# Patient Record
Sex: FEMALE | Race: WHITE | NOT HISPANIC OR LATINO | Employment: UNEMPLOYED | URBAN - METROPOLITAN AREA
[De-identification: names, ages, dates, MRNs, and addresses within clinical notes are randomized per-mention and may not be internally consistent; named-entity substitution may affect disease eponyms.]

---

## 2017-04-19 ENCOUNTER — OFF PREMISE (OUTPATIENT)
Dept: OTHER | Age: 62
End: 2017-04-19

## 2017-04-25 ENCOUNTER — OFF PREMISE (OUTPATIENT)
Dept: OTHER | Age: 62
End: 2017-04-25

## 2017-05-08 ENCOUNTER — OFF PREMISE (OUTPATIENT)
Dept: OTHER | Age: 62
End: 2017-05-08

## 2017-08-07 ENCOUNTER — OFFICE VISIT (OUTPATIENT)
Dept: FAMILY MEDICINE | Age: 62
End: 2017-08-07

## 2017-08-07 VITALS
OXYGEN SATURATION: 95 % | BODY MASS INDEX: 21.34 KG/M2 | WEIGHT: 128.09 LBS | SYSTOLIC BLOOD PRESSURE: 106 MMHG | DIASTOLIC BLOOD PRESSURE: 64 MMHG | HEIGHT: 65 IN | HEART RATE: 86 BPM

## 2017-08-07 DIAGNOSIS — E03.9 ACQUIRED HYPOTHYROIDISM: ICD-10-CM

## 2017-08-07 DIAGNOSIS — R31.9 HEMATURIA, UNSPECIFIED: ICD-10-CM

## 2017-08-07 DIAGNOSIS — E78.2 MIXED HYPERLIPIDEMIA: ICD-10-CM

## 2017-08-07 DIAGNOSIS — D64.9 ANEMIA, UNSPECIFIED: ICD-10-CM

## 2017-08-07 DIAGNOSIS — N39.0 URINARY TRACT INFECTION, SITE NOT SPECIFIED: ICD-10-CM

## 2017-08-07 PROCEDURE — G8732 NO DOC OF PAIN: HCPCS | Performed by: FAMILY MEDICINE

## 2017-08-07 PROCEDURE — 99214 OFFICE O/P EST MOD 30 MIN: CPT | Performed by: FAMILY MEDICINE

## 2017-08-07 PROCEDURE — 3046F HEMOGLOBIN A1C LEVEL >9.0%: CPT | Performed by: FAMILY MEDICINE

## 2017-08-07 PROCEDURE — G8420 CALC BMI NORM PARAMETERS: HCPCS | Performed by: FAMILY MEDICINE

## 2017-08-07 PROCEDURE — 3017F COLORECTAL CA SCREEN DOC REV: CPT | Performed by: FAMILY MEDICINE

## 2017-08-07 PROCEDURE — G8427 DOCREV CUR MEDS BY ELIG CLIN: HCPCS | Performed by: FAMILY MEDICINE

## 2017-08-07 PROCEDURE — 3014F SCREEN MAMMO DOC REV: CPT | Performed by: FAMILY MEDICINE

## 2017-08-07 PROCEDURE — 4004F PT TOBACCO SCREEN RCVD TLK: CPT | Performed by: FAMILY MEDICINE

## 2017-08-07 RX ORDER — LISINOPRIL 2.5 MG/1
2.5 TABLET ORAL DAILY
COMMUNITY
End: 2017-09-18 | Stop reason: SDUPTHER

## 2017-08-07 ASSESSMENT — ENCOUNTER SYMPTOMS
WHEEZING: 0
VOMITING: 0
SHORTNESS OF BREATH: 0
CONSTITUTIONAL NEGATIVE: 1
RESPIRATORY NEGATIVE: 1
UNEXPECTED WEIGHT CHANGE: 0
HEADACHES: 0
NEUROLOGICAL NEGATIVE: 1
NAUSEA: 0
DIZZINESS: 0
CHEST TIGHTNESS: 0
DIARRHEA: 0
NERVOUS/ANXIOUS: 1
AGITATION: 1
HALLUCINATIONS: 0
GASTROINTESTINAL NEGATIVE: 1
FATIGUE: 0

## 2017-09-01 ENCOUNTER — LAB SERVICES (OUTPATIENT)
Dept: LAB | Age: 62
End: 2017-09-01

## 2017-09-01 DIAGNOSIS — D64.9 ANEMIA, UNSPECIFIED: ICD-10-CM

## 2017-09-01 DIAGNOSIS — E78.2 MIXED HYPERLIPIDEMIA: ICD-10-CM

## 2017-09-01 DIAGNOSIS — R31.9 HEMATURIA, UNSPECIFIED: ICD-10-CM

## 2017-09-01 DIAGNOSIS — E03.9 ACQUIRED HYPOTHYROIDISM: ICD-10-CM

## 2017-09-01 LAB
ALBUMIN SERPL-MCNC: 3.9 G/DL (ref 3.6–5.1)
ALBUMIN/GLOB SERPL: 1.3 {RATIO} (ref 1–2.4)
ALP SERPL-CCNC: 90 UNITS/L (ref 45–117)
ALT SERPL-CCNC: 16 UNITS/L
ANION GAP SERPL CALC-SCNC: 11 MMOL/L (ref 10–20)
APPEARANCE UR: CLEAR
AST SERPL-CCNC: 12 UNITS/L
BACTERIA #/AREA URNS HPF: ABNORMAL /HPF
BASOPHILS # BLD AUTO: 0 K/MCL (ref 0–0.3)
BASOPHILS NFR BLD AUTO: 0 %
BILIRUB SERPL-MCNC: 0.4 MG/DL (ref 0.2–1)
BILIRUB UR QL STRIP: NEGATIVE
BUN SERPL-MCNC: 8 MG/DL (ref 6–20)
BUN/CREAT SERPL: 10 (ref 7–25)
CALCIUM SERPL-MCNC: 9.4 MG/DL (ref 8.4–10.2)
CHLORIDE SERPL-SCNC: 102 MMOL/L (ref 98–107)
CHOLEST SERPL-MCNC: 251 MG/DL
CHOLEST/HDLC SERPL: 5.3 {RATIO}
CO2 SERPL-SCNC: 30 MMOL/L (ref 21–32)
COLOR UR: YELLOW
CREAT SERPL-MCNC: 0.84 MG/DL (ref 0.51–0.95)
CREAT UR-MCNC: 18.4 MG/DL
DIFFERENTIAL METHOD BLD: ABNORMAL
EOSINOPHIL # BLD AUTO: 0.1 K/MCL (ref 0.1–0.5)
EOSINOPHIL NFR SPEC: 2 %
ERYTHROCYTE [DISTWIDTH] IN BLOOD: 13.7 % (ref 11–15)
FASTING STATUS PATIENT QL REPORTED: 12 HRS
GLOBULIN SER-MCNC: 3 G/DL (ref 2–4)
GLUCOSE SERPL-MCNC: 97 MG/DL (ref 65–99)
GLUCOSE UR STRIP-MCNC: NEGATIVE MG/DL
HBA1C MFR BLD: 5.6 % (ref 4.5–5.6)
HCT VFR BLD CALC: 41.3 % (ref 36–46.5)
HDLC SERPL-MCNC: 47 MG/DL
HGB BLD-MCNC: 13.1 G/DL (ref 12–15.5)
HGB UR QL STRIP: NEGATIVE
HYALINE CASTS #/AREA URNS LPF: ABNORMAL /LPF (ref 0–5)
KETONES UR STRIP-MCNC: NEGATIVE MG/DL
LDLC SERPL-MCNC: 176 MG/DL
LENGTH OF FAST TIME PATIENT: 10 HRS
LEUKOCYTE ESTERASE UR QL STRIP: ABNORMAL
LYMPHOCYTES # BLD MANUAL: 2.3 K/MCL (ref 1–4)
LYMPHOCYTES NFR BLD MANUAL: 33 %
MCH RBC QN AUTO: 29.2 PG (ref 26–34)
MCHC RBC AUTO-ENTMCNC: 31.7 G/DL (ref 32–36.5)
MCV RBC AUTO: 92.2 FL (ref 78–100)
MICROALBUMIN UR-MCNC: <0.5 MG/DL
MICROALBUMIN/CREAT UR: NORMAL MCG/MG
MONOCYTES # BLD MANUAL: 0.5 K/MCL (ref 0.3–0.9)
MONOCYTES NFR BLD MANUAL: 8 %
NEUTROPHILS # BLD: 4.1 K/MCL (ref 1.8–7.7)
NEUTROPHILS NFR BLD AUTO: 57 %
NITRITE UR QL STRIP: NEGATIVE
NONHDLC SERPL-MCNC: 204 MG/DL
PH UR STRIP: 6 UNITS (ref 5–7)
PLATELET # BLD: 323 K/MCL (ref 140–450)
POTASSIUM SERPL-SCNC: 4.4 MMOL/L (ref 3.4–5.1)
PROT SERPL-MCNC: 6.9 G/DL (ref 6.4–8.2)
PROT UR STRIP-MCNC: NEGATIVE MG/DL
RBC # BLD: 4.48 MIL/MCL (ref 4–5.2)
RBC #/AREA URNS HPF: ABNORMAL /HPF (ref 0–3)
SODIUM SERPL-SCNC: 139 MMOL/L (ref 135–145)
SP GR UR STRIP: 1.01 (ref 1–1.03)
SPECIMEN SOURCE: ABNORMAL
SQUAMOUS #/AREA URNS HPF: ABNORMAL /HPF (ref 0–5)
T3FREE SERPL-MCNC: 4.2 PG/ML (ref 2.2–4)
TRIGL SERPL-MCNC: 142 MG/DL
TSH SERPL-ACNC: 2.08 MCUNITS/ML (ref 0.35–5)
UROBILINOGEN UR STRIP-MCNC: 0.2 MG/DL (ref 0–1)
WBC # BLD: 7.1 K/MCL (ref 4.2–11)
WBC #/AREA URNS HPF: ABNORMAL /HPF (ref 0–5)

## 2017-09-01 PROCEDURE — 36415 COLL VENOUS BLD VENIPUNCTURE: CPT | Performed by: INTERNAL MEDICINE

## 2017-09-02 LAB
BACTERIA UR CULT: NORMAL
REPORT STATUS (RPT): NORMAL
SPECIMEN SOURCE: NORMAL

## 2017-09-14 ENCOUNTER — TELEPHONE (OUTPATIENT)
Dept: FAMILY MEDICINE | Age: 62
End: 2017-09-14

## 2017-09-18 ENCOUNTER — OFFICE VISIT (OUTPATIENT)
Dept: FAMILY MEDICINE | Age: 62
End: 2017-09-18

## 2017-09-18 VITALS
HEIGHT: 65 IN | HEART RATE: 87 BPM | DIASTOLIC BLOOD PRESSURE: 66 MMHG | BODY MASS INDEX: 22.44 KG/M2 | SYSTOLIC BLOOD PRESSURE: 108 MMHG | OXYGEN SATURATION: 97 % | WEIGHT: 134.7 LBS

## 2017-09-18 DIAGNOSIS — R60.9 EDEMA, UNSPECIFIED TYPE: Primary | ICD-10-CM

## 2017-09-18 DIAGNOSIS — Z23 NEED FOR VACCINATION: ICD-10-CM

## 2017-09-18 DIAGNOSIS — E78.00 HYPERCHOLESTEROLEMIA: ICD-10-CM

## 2017-09-18 DIAGNOSIS — R60.9 EDEMA, UNSPECIFIED TYPE: ICD-10-CM

## 2017-09-18 PROCEDURE — G0008 ADMIN INFLUENZA VIRUS VAC: HCPCS | Performed by: FAMILY MEDICINE

## 2017-09-18 PROCEDURE — 90688 IIV4 VACCINE SPLT 0.5 ML IM: CPT | Performed by: FAMILY MEDICINE

## 2017-09-18 PROCEDURE — 99214 OFFICE O/P EST MOD 30 MIN: CPT | Performed by: FAMILY MEDICINE

## 2017-09-18 RX ORDER — FUROSEMIDE 20 MG/1
TABLET ORAL
Qty: 90 TABLET | Refills: 0 | OUTPATIENT
Start: 2017-09-18

## 2017-09-18 RX ORDER — LISINOPRIL 2.5 MG/1
2.5 TABLET ORAL DAILY
Qty: 30 TABLET | Refills: 3 | Status: SHIPPED | OUTPATIENT
Start: 2017-09-18

## 2017-09-18 RX ORDER — FUROSEMIDE 20 MG/1
20 TABLET ORAL DAILY
Qty: 30 TABLET | Refills: 0 | Status: SHIPPED | OUTPATIENT
Start: 2017-09-18

## 2017-09-30 ENCOUNTER — TELEPHONE (OUTPATIENT)
Dept: FAMILY MEDICINE | Age: 62
End: 2017-09-30

## 2017-10-02 ASSESSMENT — ENCOUNTER SYMPTOMS
SHORTNESS OF BREATH: 1
NEUROLOGICAL NEGATIVE: 1
DIARRHEA: 0
UNEXPECTED WEIGHT CHANGE: 0
CHEST TIGHTNESS: 0
WHEEZING: 0
HEADACHES: 0
CONSTITUTIONAL NEGATIVE: 1
GASTROINTESTINAL NEGATIVE: 1
FATIGUE: 0
DIZZINESS: 0
VOMITING: 0
NAUSEA: 0

## 2017-10-11 ENCOUNTER — TELEPHONE (OUTPATIENT)
Dept: FAMILY MEDICINE | Age: 62
End: 2017-10-11

## 2017-10-12 ENCOUNTER — TELEPHONE (OUTPATIENT)
Dept: FAMILY MEDICINE | Age: 62
End: 2017-10-12

## 2017-10-24 ENCOUNTER — OFFICE VISIT (OUTPATIENT)
Dept: FAMILY MEDICINE | Age: 62
End: 2017-10-24

## 2017-10-24 ENCOUNTER — APPOINTMENT (OUTPATIENT)
Dept: LAB | Age: 62
End: 2017-10-24

## 2017-10-24 VITALS
BODY MASS INDEX: 21.49 KG/M2 | OXYGEN SATURATION: 97 % | SYSTOLIC BLOOD PRESSURE: 118 MMHG | HEIGHT: 65 IN | WEIGHT: 128.97 LBS | TEMPERATURE: 98.5 F | DIASTOLIC BLOOD PRESSURE: 70 MMHG | HEART RATE: 120 BPM

## 2017-10-24 DIAGNOSIS — E11.9 TYPE 2 DIABETES MELLITUS WITHOUT COMPLICATION, WITHOUT LONG-TERM CURRENT USE OF INSULIN (CMD): Primary | ICD-10-CM

## 2017-10-24 DIAGNOSIS — E03.9 ACQUIRED HYPOTHYROIDISM: ICD-10-CM

## 2017-10-24 DIAGNOSIS — E78.00 HYPERCHOLESTEROLEMIA: ICD-10-CM

## 2017-10-24 DIAGNOSIS — J06.9 ACUTE URI: ICD-10-CM

## 2017-10-24 LAB — S PYO AG THROAT QL: NEGATIVE

## 2017-10-24 PROCEDURE — 99214 OFFICE O/P EST MOD 30 MIN: CPT | Performed by: FAMILY MEDICINE

## 2017-10-24 PROCEDURE — 87880 STREP A ASSAY W/OPTIC: CPT | Performed by: INTERNAL MEDICINE

## 2017-10-24 RX ORDER — HYDROCODONE BITARTRATE AND ACETAMINOPHEN 10; 325 MG/1; MG/1
1 TABLET ORAL EVERY 6 HOURS PRN
COMMUNITY

## 2017-10-24 ASSESSMENT — ENCOUNTER SYMPTOMS
DIZZINESS: 0
FATIGUE: 0
HEADACHES: 0
VOMITING: 0
GASTROINTESTINAL NEGATIVE: 1
RESPIRATORY NEGATIVE: 1
NAUSEA: 0
HALLUCINATIONS: 0
WHEEZING: 0
CONSTITUTIONAL NEGATIVE: 1
SHORTNESS OF BREATH: 0
AGITATION: 1
DIARRHEA: 0
NEUROLOGICAL NEGATIVE: 1
CHEST TIGHTNESS: 0
NERVOUS/ANXIOUS: 1
UNEXPECTED WEIGHT CHANGE: 0

## 2017-10-26 LAB
REPORT STATUS (RPT): ABNORMAL
S PYO SPEC QL CULT: ABNORMAL
SPECIMEN SOURCE: ABNORMAL

## 2017-10-31 ENCOUNTER — TELEPHONE (OUTPATIENT)
Dept: FAMILY MEDICINE | Age: 62
End: 2017-10-31

## 2017-10-31 RX ORDER — PENICILLIN V POTASSIUM 500 MG/1
500 TABLET ORAL 3 TIMES DAILY
Qty: 30 TABLET | Refills: 0 | Status: SHIPPED | OUTPATIENT
Start: 2017-10-31

## 2017-11-11 ENCOUNTER — APPOINTMENT (OUTPATIENT)
Dept: GENERAL RADIOLOGY | Age: 62
End: 2017-11-11
Attending: EMERGENCY MEDICINE

## 2017-11-11 ENCOUNTER — HOSPITAL ENCOUNTER (EMERGENCY)
Age: 62
Discharge: HOME | End: 2017-11-11
Attending: EMERGENCY MEDICINE

## 2017-11-11 VITALS
RESPIRATION RATE: 28 BRPM | SYSTOLIC BLOOD PRESSURE: 131 MMHG | HEART RATE: 112 BPM | TEMPERATURE: 97.7 F | DIASTOLIC BLOOD PRESSURE: 82 MMHG | BODY MASS INDEX: 18.96 KG/M2 | OXYGEN SATURATION: 94 % | WEIGHT: 118 LBS | HEIGHT: 66 IN

## 2017-11-11 DIAGNOSIS — F41.9 ANXIETY: ICD-10-CM

## 2017-11-11 DIAGNOSIS — R07.9 CHEST PAIN AT REST: Primary | ICD-10-CM

## 2017-11-11 LAB
ALBUMIN SERPL-MCNC: 4.1 G/DL (ref 3.6–5.1)
ALBUMIN/GLOB SERPL: 1.2 {RATIO} (ref 1–2.4)
ALP SERPL-CCNC: 100 UNITS/L (ref 45–117)
ALT SERPL-CCNC: 9 UNITS/L
ANION GAP SERPL CALC-SCNC: 9 MMOL/L (ref 10–20)
AST SERPL-CCNC: 16 UNITS/L
BASOPHILS # BLD AUTO: 0 K/MCL (ref 0–0.3)
BASOPHILS NFR BLD AUTO: 0 %
BILIRUB SERPL-MCNC: 0.2 MG/DL (ref 0.2–1)
BUN SERPL-MCNC: 10 MG/DL (ref 6–20)
BUN/CREAT SERPL: 12 (ref 7–25)
CALCIUM SERPL-MCNC: 10.2 MG/DL (ref 8.4–10.2)
CHLORIDE SERPL-SCNC: 106 MMOL/L (ref 98–107)
CO2 SERPL-SCNC: 33 MMOL/L (ref 21–32)
CREAT SERPL-MCNC: 0.84 MG/DL (ref 0.51–0.95)
DIFFERENTIAL METHOD BLD: NORMAL
EOSINOPHIL # BLD AUTO: 0.2 K/MCL (ref 0.1–0.5)
EOSINOPHIL NFR SPEC: 2 %
ERYTHROCYTE [DISTWIDTH] IN BLOOD: 13.6 % (ref 11–15)
GLOBULIN SER-MCNC: 3.5 G/DL (ref 2–4)
GLUCOSE SERPL-MCNC: 91 MG/DL (ref 65–99)
HCT VFR BLD CALC: 42.9 % (ref 36–46.5)
HGB BLD-MCNC: 14.3 G/DL (ref 12–15.5)
LYMPHOCYTES # BLD MANUAL: 3.3 K/MCL (ref 1–4)
LYMPHOCYTES NFR BLD MANUAL: 34 %
MCH RBC QN AUTO: 30.2 PG (ref 26–34)
MCHC RBC AUTO-ENTMCNC: 33.3 G/DL (ref 32–36.5)
MCV RBC AUTO: 90.7 FL (ref 78–100)
MONOCYTES # BLD MANUAL: 0.7 K/MCL (ref 0.3–0.9)
MONOCYTES NFR BLD MANUAL: 7 %
NEUTROPHILS # BLD: 5.5 K/MCL (ref 1.8–7.7)
NEUTROPHILS NFR BLD AUTO: 57 %
PLATELET # BLD: 370 K/MCL (ref 140–450)
POTASSIUM SERPL-SCNC: 3.5 MMOL/L (ref 3.4–5.1)
PROT SERPL-MCNC: 7.6 G/DL (ref 6.4–8.2)
RBC # BLD: 4.73 MIL/MCL (ref 4–5.2)
SODIUM SERPL-SCNC: 144 MMOL/L (ref 135–145)
TROPONIN I SERPL-MCNC: <0.02 NG/ML
WBC # BLD: 9.6 K/MCL (ref 4.2–11)

## 2017-11-11 PROCEDURE — 99285 EMERGENCY DEPT VISIT HI MDM: CPT

## 2017-11-11 PROCEDURE — 71010 XR CHEST AP OR PA: CPT | Performed by: RADIOLOGY

## 2017-11-11 PROCEDURE — 99285 EMERGENCY DEPT VISIT HI MDM: CPT | Performed by: EMERGENCY MEDICINE

## 2017-11-11 PROCEDURE — 96372 THER/PROPH/DIAG INJ SC/IM: CPT | Performed by: EMERGENCY MEDICINE

## 2017-11-11 PROCEDURE — 96374 THER/PROPH/DIAG INJ IV PUSH: CPT

## 2017-11-11 PROCEDURE — 85025 COMPLETE CBC W/AUTO DIFF WBC: CPT

## 2017-11-11 PROCEDURE — 80053 COMPREHEN METABOLIC PANEL: CPT

## 2017-11-11 PROCEDURE — 93005 ELECTROCARDIOGRAM TRACING: CPT | Performed by: EMERGENCY MEDICINE

## 2017-11-11 PROCEDURE — 10004651 HB RX, NO CHARGE ITEM: Performed by: EMERGENCY MEDICINE

## 2017-11-11 PROCEDURE — 84484 ASSAY OF TROPONIN QUANT: CPT

## 2017-11-11 PROCEDURE — 71010 XR CHEST AP OR PA: CPT

## 2017-11-11 PROCEDURE — 36415 COLL VENOUS BLD VENIPUNCTURE: CPT

## 2017-11-11 PROCEDURE — 10002800 HB RX 250 W HCPCS: Performed by: EMERGENCY MEDICINE

## 2017-11-11 RX ORDER — HALOPERIDOL 5 MG/ML
5 INJECTION INTRAMUSCULAR ONCE
Status: COMPLETED | OUTPATIENT
Start: 2017-11-11 | End: 2017-11-11

## 2017-11-11 RX ORDER — LORAZEPAM 2 MG/ML
1 INJECTION INTRAMUSCULAR ONCE
Status: COMPLETED | OUTPATIENT
Start: 2017-11-11 | End: 2017-11-11

## 2017-11-11 RX ORDER — ASPIRIN 325 MG
325 TABLET ORAL ONCE
Status: COMPLETED | OUTPATIENT
Start: 2017-11-11 | End: 2017-11-11

## 2017-11-11 RX ADMIN — HALOPERIDOL LACTATE 5 MG: 5 INJECTION, SOLUTION INTRAMUSCULAR at 02:27

## 2017-11-11 RX ADMIN — ASPIRIN 325 MG: 325 TABLET ORAL at 02:11

## 2017-11-11 RX ADMIN — LORAZEPAM 1 MG: 2 INJECTION INTRAMUSCULAR; INTRAVENOUS at 02:07

## 2017-11-11 ASSESSMENT — MOVEMENT AND STRENGTH ASSESSMENTS: FACE_JAW: NO FACIAL DROOP

## 2017-11-11 ASSESSMENT — PAIN SCALES - GENERAL: PAINLEVEL_OUTOF10: 10

## 2017-11-12 ASSESSMENT — ENCOUNTER SYMPTOMS
SHORTNESS OF BREATH: 1
ACTIVITY CHANGE: 1
LIGHT-HEADEDNESS: 0
NERVOUS/ANXIOUS: 1
DIZZINESS: 0
AGITATION: 1
FEVER: 0
COUGH: 0
NAUSEA: 0
VOMITING: 0
RHINORRHEA: 0

## 2017-11-17 ASSESSMENT — ENCOUNTER SYMPTOMS
SORE THROAT: 1
RHINORRHEA: 1
SINUS PRESSURE: 0

## 2018-02-07 ENCOUNTER — TELEPHONE (OUTPATIENT)
Dept: FAMILY MEDICINE | Age: 63
End: 2018-02-07

## 2018-03-09 ENCOUNTER — CLINICAL ABSTRACT (OUTPATIENT)
Dept: HEALTH INFORMATION MANAGEMENT | Age: 63
End: 2018-03-09

## 2018-03-23 NOTE — LETTER
Nader Valladares M.D., F.A.C.S. Rene Johnson M.D., F.A.C.S. Daiana Garcia M.D., Alfreda David. RUTH Ferrara M.D., F.A.C.S. Kamilah Quinn. Tabatha Perales M.D., F.A.C.S. Surya Stewart M.D. JESUS Sauceda M.D., F.A.C.S. Tadeo Mortensen. Aleena Raphael M.D., F.A.C.S. Mar Sanz M.D., F.A.C.S. Odessa Lopez M.D., F.A.C.S. Naeem Hunter M.D. F.A.C.S. [facility]. Nereyda Rick M.D., F.A.C.S. Jerod Amaya M.D., F.A.C.S. Jordon Kemp M.D., F.A.C.S., F.A.C.CIoana Ahumada M.D., F.A.C.S. Grey Oliveira M.D., F.A.C.S. Aylin Bonilla M.D., F.A.C.S. Karthik Coelho. Wes Schreiber M.D., F.A.C.S. JAILYN Rincon M.D., Beverely Rain. C.S  Chris Rizvi M.D. Leila Elmore M.D., F.A.C.S. Lai Tariq. Fermin Zamora M.D., F.A.C.S. Cha Spencer M.D. JAILYN Ball M.D. PhD.  Nia Blankenship M.D. Emilie Barrios M.D. ISRAEL Montalvo. Nora Ocasio M.D. Pedro Luis Arambula M.D. Santo Kramer M.D. Lera Siemens, M.D.   Meagan García D.O., F.A.C.S. Raissa Degroot M.D., F.A.C.S. Arleth Judd M.D. Welcome to [facility] As you contemplate possible surgical treatment, it is very important to us that you understand fully what is being discussed, that all of your questions have been answered, and that your options for treatment have been fully explained. To that end, on the following page we will ask you some questions to make certain that you understand everything which has been explained to you. Included in this understanding is that there are both surgical and nonsurgical treatments available for you, that you have options regarding where your care is given, and what doctors are involved in your care. Included in these options would, of course, be the option to elect for no treatment whatsoever.  We especially want to be sure that you have had a chance to have all of your questions and concerns answered. If there are any issues which have not been adequately addressed, we ask you to bring them forward so that we can thoroughly address them. A patient who is fully informed and understands their condition and options for treatment, as well as potential adverse effects of treatment, is going to be a patient who receives the most benefit out of care rendered. Our goal in addition to providing excellent surgical care is to provide the necessary information to you and your family in order to make decisions which are appropriate relative to your own care. Please take the time necessary to read and answer the questions on the next page. Again, if you have any questions, bring them forward and we will certainly address them. Sincerely,    [facility].    _______________________  ____________________________________  Date:                                             Patient Signature  ________________________  Nik Burnette  Witness Consent Form         Revised: 2015  Patient Name: Nik Burnette     : 1955                 Printed: 6/15/13 9:43 AM     Medical Record #: [id number]                Page: 1 of  2        [facility] Supplemental Consent Form    A [facility] surgeon has met with me and explained the matter of my illness, and what treatments might be available to improve my condition. As a result of that conversation, I understand the following:    A [facility] surgeon met with me and explained, in detail, the nature of my condition for which surgery is being contemplated.  The procedure to be performed  Is: CORONARY ARTERY BYPASS GRAFT            Yes _____ No _____    A [facility] surgeon has explained to me that there are alternatives to surgery which might include no surgery, medical therapy, or interventional treatment, among other options and the risks and benefits of the different treatment options:    Yes _____ No _____    A [facility] surgeon as explained to me that if I should so desire, he/she is willing to explain my case and the surgical and non-surgical options to family members: Yes _____ No _____    A [facility] surgeon has answered all of my questions regarding the topics we have discussed. I have been invited to ask more questions:  Yes _____ No _____    A [facility] surgeon has explained to me that if I seek other options or wish treatment at another facility in [place] or [place], or anywhere in the [place], that his/her office will assist me in making such accommodations:   Yes _____ No _____    A [facility] surgeon has explained to me, that death, risk of bleeding, stroke, multi-organ failure, heart attack or other complications are risks for the proposed surgical procedure: Yes _____ No _____    A [facility] surgeon has explained to me that I have the right to cancel or postpone the surgery at any time prior to the start of surgery: Yes _____ No _____    The nature and options for treatment for my condition have been explained to me, in detail, by a [facility] surgeon and all questions have been answered to my satisfaction. I understand that I am not required to undergo surgery, and further, that if I so desire, I could have surgery accomplished by another surgeon or at another institution. I understand and accept that which has been explained to me.  I am able to make my decisions knowingly and willfully based on the data.    ______________________   __________________________________  Date       Patient Signature  ______________________________ Tally Balm  Witness Consent Form   Patient Name: Ivonne Banegas: 4/25/1955                 Medical Record #: [id number]    Page: 2 of 2        Printed: July 11, 2022 tinetti total score 25/28 total score 12/28. Based on this exam , pt is at high risk to falls.

## 2020-01-24 PROBLEM — M51.9 LUMBAR DISC DISEASE: Status: ACTIVE | Noted: 2020-01-24

## 2020-01-24 PROBLEM — M50.90 CERVICAL DISC DISEASE: Status: ACTIVE | Noted: 2020-01-24

## 2020-01-24 PROBLEM — R79.89 HIGH SERUM LOW-DENSITY LIPOPROTEIN (LDL): Status: ACTIVE | Noted: 2020-01-24

## 2020-01-24 PROBLEM — R01.1 HEART MURMUR: Status: ACTIVE | Noted: 2020-01-24

## 2020-01-24 PROBLEM — M25.512 ARTHRALGIA OF LEFT ACROMIOCLAVICULAR JOINT: Status: ACTIVE | Noted: 2020-01-24

## 2020-01-24 PROBLEM — E78.5 DYSLIPIDEMIA: Status: ACTIVE | Noted: 2020-01-24

## 2020-01-24 PROBLEM — Z78.0 POST-MENOPAUSAL: Status: ACTIVE | Noted: 2020-01-24

## 2020-01-24 PROBLEM — E78.1 HIGH TRIGLYCERIDES: Status: ACTIVE | Noted: 2020-01-24

## 2021-02-09 PROBLEM — F41.0 GENERALIZED ANXIETY DISORDER WITH PANIC ATTACKS: Status: ACTIVE | Noted: 2021-02-09

## 2021-02-09 PROBLEM — M54.9 CHRONIC BACK PAIN: Status: ACTIVE | Noted: 2021-02-09

## 2021-02-09 PROBLEM — F41.1 GENERALIZED ANXIETY DISORDER WITH PANIC ATTACKS: Status: ACTIVE | Noted: 2021-02-09

## 2021-02-09 PROBLEM — J44.9 COPD (CHRONIC OBSTRUCTIVE PULMONARY DISEASE) (HCC): Status: ACTIVE | Noted: 2021-02-09

## 2021-02-09 PROBLEM — G89.29 CHRONIC BACK PAIN: Status: ACTIVE | Noted: 2021-02-09

## 2021-03-09 PROBLEM — M46.90 INFLAMMATORY SPONDYLOPATHY, UNSPECIFIED SPINAL REGION: Status: ACTIVE | Noted: 2021-03-09

## 2021-03-09 PROBLEM — M46.90 INFLAMMATORY SPONDYLOPATHY, UNSPECIFIED SPINAL REGION (HCC): Status: ACTIVE | Noted: 2021-03-09

## 2021-12-09 PROBLEM — R22.1 NECK MASS: Status: ACTIVE | Noted: 2021-12-09

## 2022-01-16 ENCOUNTER — LAB ENCOUNTER (OUTPATIENT)
Dept: LAB | Facility: HOSPITAL | Age: 67
End: 2022-01-16
Attending: OTOLARYNGOLOGY
Payer: MEDICARE

## 2022-01-16 DIAGNOSIS — R22.1 NECK MASS: ICD-10-CM

## 2022-01-17 PROBLEM — R07.89 OTHER CHEST PAIN: Status: ACTIVE | Noted: 2022-01-17

## 2022-01-17 LAB — SARS-COV-2 RNA RESP QL NAA+PROBE: NOT DETECTED

## 2022-01-18 ENCOUNTER — ANESTHESIA EVENT (OUTPATIENT)
Dept: SURGERY | Facility: HOSPITAL | Age: 67
End: 2022-01-18
Payer: MEDICARE

## 2022-01-18 ENCOUNTER — ANESTHESIA (OUTPATIENT)
Dept: SURGERY | Facility: HOSPITAL | Age: 67
End: 2022-01-18
Payer: MEDICARE

## 2022-01-18 ENCOUNTER — HOSPITAL ENCOUNTER (OUTPATIENT)
Facility: HOSPITAL | Age: 67
Setting detail: HOSPITAL OUTPATIENT SURGERY
Discharge: HOME | End: 2022-01-18
Attending: OTOLARYNGOLOGY | Admitting: OTOLARYNGOLOGY
Payer: MEDICARE

## 2022-01-18 VITALS
HEART RATE: 76 BPM | DIASTOLIC BLOOD PRESSURE: 84 MMHG | HEIGHT: 65 IN | BODY MASS INDEX: 30.49 KG/M2 | WEIGHT: 183 LBS | OXYGEN SATURATION: 94 % | SYSTOLIC BLOOD PRESSURE: 122 MMHG | TEMPERATURE: 98 F | RESPIRATION RATE: 17 BRPM

## 2022-01-18 DIAGNOSIS — R22.1 NECK MASS: Primary | ICD-10-CM

## 2022-01-18 PROCEDURE — 88304 TISSUE EXAM BY PATHOLOGIST: CPT | Performed by: OTOLARYNGOLOGY

## 2022-01-18 PROCEDURE — 0KB30ZZ EXCISION OF LEFT NECK MUSCLE, OPEN APPROACH: ICD-10-PCS | Performed by: OTOLARYNGOLOGY

## 2022-01-18 RX ORDER — NALOXONE HYDROCHLORIDE 0.4 MG/ML
80 INJECTION, SOLUTION INTRAMUSCULAR; INTRAVENOUS; SUBCUTANEOUS AS NEEDED
Status: DISCONTINUED | OUTPATIENT
Start: 2022-01-18 | End: 2022-01-18

## 2022-01-18 RX ORDER — HYDROCODONE BITARTRATE AND ACETAMINOPHEN 5; 325 MG/1; MG/1
1 TABLET ORAL AS NEEDED
Status: DISCONTINUED | OUTPATIENT
Start: 2022-01-18 | End: 2022-01-18

## 2022-01-18 RX ORDER — CEFAZOLIN SODIUM/WATER 2 G/20 ML
2 SYRINGE (ML) INTRAVENOUS ONCE
Status: DISCONTINUED | OUTPATIENT
Start: 2022-01-18 | End: 2022-01-18 | Stop reason: HOSPADM

## 2022-01-18 RX ORDER — HYDROCODONE BITARTRATE AND ACETAMINOPHEN 5; 325 MG/1; MG/1
2 TABLET ORAL AS NEEDED
Status: DISCONTINUED | OUTPATIENT
Start: 2022-01-18 | End: 2022-01-18

## 2022-01-18 RX ORDER — SODIUM CHLORIDE, SODIUM LACTATE, POTASSIUM CHLORIDE, CALCIUM CHLORIDE 600; 310; 30; 20 MG/100ML; MG/100ML; MG/100ML; MG/100ML
INJECTION, SOLUTION INTRAVENOUS CONTINUOUS
Status: DISCONTINUED | OUTPATIENT
Start: 2022-01-18 | End: 2022-01-18

## 2022-01-18 RX ORDER — METOCLOPRAMIDE HYDROCHLORIDE 5 MG/ML
10 INJECTION INTRAMUSCULAR; INTRAVENOUS AS NEEDED
Status: DISCONTINUED | OUTPATIENT
Start: 2022-01-18 | End: 2022-01-18

## 2022-01-18 RX ORDER — ONDANSETRON 2 MG/ML
INJECTION INTRAMUSCULAR; INTRAVENOUS AS NEEDED
Status: DISCONTINUED | OUTPATIENT
Start: 2022-01-18 | End: 2022-01-18 | Stop reason: SURG

## 2022-01-18 RX ORDER — HYDROMORPHONE HYDROCHLORIDE 1 MG/ML
0.4 INJECTION, SOLUTION INTRAMUSCULAR; INTRAVENOUS; SUBCUTANEOUS EVERY 5 MIN PRN
Status: DISCONTINUED | OUTPATIENT
Start: 2022-01-18 | End: 2022-01-18

## 2022-01-18 RX ORDER — LIDOCAINE HYDROCHLORIDE 10 MG/ML
INJECTION, SOLUTION EPIDURAL; INFILTRATION; INTRACAUDAL; PERINEURAL AS NEEDED
Status: DISCONTINUED | OUTPATIENT
Start: 2022-01-18 | End: 2022-01-18 | Stop reason: SURG

## 2022-01-18 RX ORDER — LIDOCAINE HYDROCHLORIDE AND EPINEPHRINE 10; 10 MG/ML; UG/ML
INJECTION, SOLUTION INFILTRATION; PERINEURAL AS NEEDED
Status: DISCONTINUED | OUTPATIENT
Start: 2022-01-18 | End: 2022-01-18 | Stop reason: HOSPADM

## 2022-01-18 RX ORDER — ONDANSETRON 2 MG/ML
4 INJECTION INTRAMUSCULAR; INTRAVENOUS AS NEEDED
Status: DISCONTINUED | OUTPATIENT
Start: 2022-01-18 | End: 2022-01-18

## 2022-01-18 RX ORDER — ACETAMINOPHEN 500 MG
1000 TABLET ORAL ONCE
Status: DISCONTINUED | OUTPATIENT
Start: 2022-01-18 | End: 2022-01-18 | Stop reason: HOSPADM

## 2022-01-18 RX ORDER — ROCURONIUM BROMIDE 10 MG/ML
INJECTION, SOLUTION INTRAVENOUS AS NEEDED
Status: DISCONTINUED | OUTPATIENT
Start: 2022-01-18 | End: 2022-01-18 | Stop reason: SURG

## 2022-01-18 RX ORDER — DEXAMETHASONE SODIUM PHOSPHATE 4 MG/ML
VIAL (ML) INJECTION AS NEEDED
Status: DISCONTINUED | OUTPATIENT
Start: 2022-01-18 | End: 2022-01-18 | Stop reason: SURG

## 2022-01-18 RX ADMIN — LIDOCAINE HYDROCHLORIDE 50 MG: 10 INJECTION, SOLUTION EPIDURAL; INFILTRATION; INTRACAUDAL; PERINEURAL at 10:54:00

## 2022-01-18 RX ADMIN — DEXAMETHASONE SODIUM PHOSPHATE 8 MG: 4 MG/ML VIAL (ML) INJECTION at 11:00:00

## 2022-01-18 RX ADMIN — ROCURONIUM BROMIDE 30 MG: 10 INJECTION, SOLUTION INTRAVENOUS at 10:54:00

## 2022-01-18 RX ADMIN — SODIUM CHLORIDE, SODIUM LACTATE, POTASSIUM CHLORIDE, CALCIUM CHLORIDE: 600; 310; 30; 20 INJECTION, SOLUTION INTRAVENOUS at 11:26:00

## 2022-01-18 RX ADMIN — ONDANSETRON 4 MG: 2 INJECTION INTRAMUSCULAR; INTRAVENOUS at 11:03:00

## 2022-01-18 NOTE — H&P
History and physical by Dr. Christy Butt reviewed and up to date. No changes to H&P.     Plan is excision of left supraclavicular lipoma

## 2022-01-18 NOTE — ANESTHESIA PROCEDURE NOTES
Airway  Date/Time: 1/18/2022 10:56 AM  Urgency: elective      General Information and Staff    Patient location during procedure: OR  Anesthesiologist: Emma Bobby MD  Resident/CRNA: Jose Alejandro Voss CRNA  Performed: CRNA     Indications and Patient Condition

## 2022-01-18 NOTE — ANESTHESIA PREPROCEDURE EVALUATION
PRE-OP EVALUATION    Patient Name: Amy Figueroa    Admit Diagnosis: Neck mass [R22.1]    Pre-op Diagnosis: Neck mass [R22.1]    EXCISION DEEP NECK MASS-LEFT    Anesthesia Procedure: EXCISION DEEP NECK MASS-LEFT (Left )    Surgeon(s) and Role:     * Darlene removed, which caussed spinal fluid leak which was fixed after three surgeries four year aso    • SPINE SURGERY PROCEDURE UNLISTED      4 back surgeries   • TONSILLECTOMY       Social History    Tobacco Use      Smoking status: Former Smoker      Smokeless

## 2022-01-18 NOTE — ANESTHESIA POSTPROCEDURE EVALUATION
[address] Patient Status:  Hospital Outpatient Surgery   Age/Gender 78year old female MRN [id number]   [facility] SURGERY Attending Los Sanchez MD   Hosp Day # 0 PCP Josue Merritt DO       Anesthesia Post-op Note

## 2022-01-18 NOTE — OPERATIVE REPORT
[address] Patient Status:  Hospital Outpatient Surgery    1955 MRN [id number]   Location [facility] Attending Corey Hoyos MD   Hosp Day # 0 PCP Eugenia Marshall DO       Neck mass excision Op No

## 2022-02-14 ENCOUNTER — HOSPITAL ENCOUNTER (INPATIENT)
Facility: HOSPITAL | Age: 67
LOS: 3 days | Discharge: HOME HEALTH | DRG: 272 | End: 2022-02-17
Attending: EMERGENCY MEDICINE | Admitting: INTERNAL MEDICINE
Payer: MEDICARE

## 2022-02-14 ENCOUNTER — APPOINTMENT (OUTPATIENT)
Dept: ULTRASOUND IMAGING | Facility: HOSPITAL | Age: 67
DRG: 272 | End: 2022-02-14
Attending: EMERGENCY MEDICINE
Payer: MEDICARE

## 2022-02-14 DIAGNOSIS — I82.4Y2 DEEP VEIN THROMBOSIS (DVT) OF PROXIMAL VEIN OF LEFT LOWER EXTREMITY, UNSPECIFIED CHRONICITY (HCC): Primary | ICD-10-CM

## 2022-02-14 LAB
ALBUMIN SERPL-MCNC: 3.6 G/DL (ref 3.4–5)
ALP LIVER SERPL-CCNC: 120 U/L
ALT SERPL-CCNC: 19 U/L
ANION GAP SERPL CALC-SCNC: 9 MMOL/L (ref 0–18)
APTT PPP: 118.9 SECONDS (ref 23.3–35.6)
APTT PPP: 26.2 SECONDS (ref 23.3–35.6)
AST SERPL-CCNC: 15 U/L (ref 15–37)
ATRIAL RATE: 115 BPM
BASOPHILS # BLD AUTO: 0.07 X10(3) UL (ref 0–0.2)
BASOPHILS NFR BLD AUTO: 0.6 %
BILIRUB SERPL-MCNC: 0.7 MG/DL (ref 0.1–2)
BUN BLD-MCNC: 13 MG/DL (ref 7–18)
CALCIUM BLD-MCNC: 9.5 MG/DL (ref 8.5–10.1)
CHLORIDE SERPL-SCNC: 108 MMOL/L (ref 98–112)
CO2 SERPL-SCNC: 21 MMOL/L (ref 21–32)
CREAT BLD-MCNC: 1.12 MG/DL
EOSINOPHIL # BLD AUTO: 0.12 X10(3) UL (ref 0–0.7)
EOSINOPHIL NFR BLD AUTO: 1 %
ERYTHROCYTE [DISTWIDTH] IN BLOOD BY AUTOMATED COUNT: 13 %
GLOBULIN PLAS-MCNC: 4.3 G/DL (ref 2.8–4.4)
GLUCOSE BLD-MCNC: 172 MG/DL (ref 70–99)
HCT VFR BLD AUTO: 40.2 %
HGB BLD-MCNC: 13.2 G/DL
IMM GRANULOCYTES # BLD AUTO: 0.04 X10(3) UL (ref 0–1)
IMM GRANULOCYTES NFR BLD: 0.3 %
INR BLD: 1.15 (ref 0.8–1.2)
LYMPHOCYTES # BLD AUTO: 1.28 X10(3) UL (ref 1–4)
LYMPHOCYTES NFR BLD AUTO: 10.8 %
MCH RBC QN AUTO: 28 PG (ref 26–34)
MCHC RBC AUTO-ENTMCNC: 32.8 G/DL (ref 31–37)
MCV RBC AUTO: 85.2 FL
MONOCYTES # BLD AUTO: 0.75 X10(3) UL (ref 0.1–1)
MONOCYTES NFR BLD AUTO: 6.3 %
NEUTROPHILS # BLD AUTO: 9.62 X10 (3) UL (ref 1.5–7.7)
NEUTROPHILS # BLD AUTO: 9.62 X10(3) UL (ref 1.5–7.7)
NEUTROPHILS NFR BLD AUTO: 81 %
OSMOLALITY SERPL CALC.SUM OF ELEC: 290 MOSM/KG (ref 275–295)
P AXIS: 51 DEGREES
P-R INTERVAL: 144 MS
PLATELET # BLD AUTO: 295 10(3)UL (ref 150–450)
POTASSIUM SERPL-SCNC: 4 MMOL/L (ref 3.5–5.1)
PROT SERPL-MCNC: 7.9 G/DL (ref 6.4–8.2)
PROTHROMBIN TIME: 14.7 SECONDS (ref 11.6–14.8)
Q-T INTERVAL: 332 MS
QRS DURATION: 62 MS
QTC CALCULATION (BEZET): 459 MS
R AXIS: 26 DEGREES
RBC # BLD AUTO: 4.72 X10(6)UL
SARS-COV-2 RNA RESP QL NAA+PROBE: NOT DETECTED
SODIUM SERPL-SCNC: 138 MMOL/L (ref 136–145)
T AXIS: 46 DEGREES
VENTRICULAR RATE: 115 BPM
WBC # BLD AUTO: 11.9 X10(3) UL (ref 4–11)

## 2022-02-14 PROCEDURE — 93010 ELECTROCARDIOGRAM REPORT: CPT

## 2022-02-14 PROCEDURE — 85730 THROMBOPLASTIN TIME PARTIAL: CPT | Performed by: HOSPITALIST

## 2022-02-14 PROCEDURE — 93971 EXTREMITY STUDY: CPT | Performed by: EMERGENCY MEDICINE

## 2022-02-14 PROCEDURE — 85610 PROTHROMBIN TIME: CPT | Performed by: EMERGENCY MEDICINE

## 2022-02-14 PROCEDURE — 96374 THER/PROPH/DIAG INJ IV PUSH: CPT

## 2022-02-14 PROCEDURE — 80053 COMPREHEN METABOLIC PANEL: CPT | Performed by: EMERGENCY MEDICINE

## 2022-02-14 PROCEDURE — 93005 ELECTROCARDIOGRAM TRACING: CPT

## 2022-02-14 PROCEDURE — 96376 TX/PRO/DX INJ SAME DRUG ADON: CPT

## 2022-02-14 PROCEDURE — 85730 THROMBOPLASTIN TIME PARTIAL: CPT | Performed by: EMERGENCY MEDICINE

## 2022-02-14 PROCEDURE — 99291 CRITICAL CARE FIRST HOUR: CPT

## 2022-02-14 PROCEDURE — 99285 EMERGENCY DEPT VISIT HI MDM: CPT

## 2022-02-14 PROCEDURE — 85025 COMPLETE CBC W/AUTO DIFF WBC: CPT | Performed by: EMERGENCY MEDICINE

## 2022-02-14 RX ORDER — MORPHINE SULFATE 2 MG/ML
1 INJECTION, SOLUTION INTRAMUSCULAR; INTRAVENOUS EVERY 2 HOUR PRN
Status: DISCONTINUED | OUTPATIENT
Start: 2022-02-14 | End: 2022-02-14

## 2022-02-14 RX ORDER — ACETAMINOPHEN 325 MG/1
650 TABLET ORAL EVERY 6 HOURS PRN
Status: DISCONTINUED | OUTPATIENT
Start: 2022-02-14 | End: 2022-02-17

## 2022-02-14 RX ORDER — HYDROCODONE BITARTRATE AND ACETAMINOPHEN 5; 325 MG/1; MG/1
2 TABLET ORAL EVERY 4 HOURS PRN
Status: DISCONTINUED | OUTPATIENT
Start: 2022-02-14 | End: 2022-02-17

## 2022-02-14 RX ORDER — SODIUM CHLORIDE 9 MG/ML
INJECTION, SOLUTION INTRAVENOUS CONTINUOUS
Status: ACTIVE | OUTPATIENT
Start: 2022-02-14 | End: 2022-02-14

## 2022-02-14 RX ORDER — ONDANSETRON 2 MG/ML
4 INJECTION INTRAMUSCULAR; INTRAVENOUS EVERY 4 HOURS PRN
Status: DISCONTINUED | OUTPATIENT
Start: 2022-02-14 | End: 2022-02-14

## 2022-02-14 RX ORDER — SENNOSIDES 8.6 MG
17.2 TABLET ORAL NIGHTLY PRN
Status: DISCONTINUED | OUTPATIENT
Start: 2022-02-14 | End: 2022-02-17

## 2022-02-14 RX ORDER — BISACODYL 10 MG
10 SUPPOSITORY, RECTAL RECTAL
Status: DISCONTINUED | OUTPATIENT
Start: 2022-02-14 | End: 2022-02-17

## 2022-02-14 RX ORDER — MORPHINE SULFATE 4 MG/ML
4 INJECTION, SOLUTION INTRAMUSCULAR; INTRAVENOUS EVERY 30 MIN PRN
Status: DISCONTINUED | OUTPATIENT
Start: 2022-02-14 | End: 2022-02-14

## 2022-02-14 RX ORDER — POLYETHYLENE GLYCOL 3350 17 G/17G
17 POWDER, FOR SOLUTION ORAL DAILY PRN
Status: DISCONTINUED | OUTPATIENT
Start: 2022-02-14 | End: 2022-02-17

## 2022-02-14 RX ORDER — HEPARIN SODIUM 5000 [USP'U]/ML
80 INJECTION INTRAVENOUS; SUBCUTANEOUS ONCE
Status: DISCONTINUED | OUTPATIENT
Start: 2022-02-14 | End: 2022-02-17

## 2022-02-14 RX ORDER — BUPRENORPHINE AND NALOXONE 2; .5 MG/1; MG/1
1 FILM, SOLUBLE BUCCAL; SUBLINGUAL DAILY
Status: ON HOLD | COMMUNITY
Start: 2022-01-30 | End: 2022-02-14 | Stop reason: CLARIF

## 2022-02-14 RX ORDER — HYDROMORPHONE HYDROCHLORIDE 1 MG/ML
0.8 INJECTION, SOLUTION INTRAMUSCULAR; INTRAVENOUS; SUBCUTANEOUS EVERY 2 HOUR PRN
Status: DISCONTINUED | OUTPATIENT
Start: 2022-02-14 | End: 2022-02-17

## 2022-02-14 RX ORDER — ACETAMINOPHEN 325 MG/1
650 TABLET ORAL EVERY 4 HOURS PRN
Status: DISCONTINUED | OUTPATIENT
Start: 2022-02-14 | End: 2022-02-17

## 2022-02-14 RX ORDER — ONDANSETRON 2 MG/ML
4 INJECTION INTRAMUSCULAR; INTRAVENOUS EVERY 6 HOURS PRN
Status: DISCONTINUED | OUTPATIENT
Start: 2022-02-14 | End: 2022-02-17

## 2022-02-14 RX ORDER — HEPARIN SODIUM AND DEXTROSE 10000; 5 [USP'U]/100ML; G/100ML
18 INJECTION INTRAVENOUS ONCE
Status: DISCONTINUED | OUTPATIENT
Start: 2022-02-14 | End: 2022-02-17

## 2022-02-14 RX ORDER — MORPHINE SULFATE 4 MG/ML
4 INJECTION, SOLUTION INTRAMUSCULAR; INTRAVENOUS EVERY 2 HOUR PRN
Status: DISCONTINUED | OUTPATIENT
Start: 2022-02-14 | End: 2022-02-14

## 2022-02-14 RX ORDER — HYDROMORPHONE HYDROCHLORIDE 1 MG/ML
0.2 INJECTION, SOLUTION INTRAMUSCULAR; INTRAVENOUS; SUBCUTANEOUS EVERY 2 HOUR PRN
Status: DISCONTINUED | OUTPATIENT
Start: 2022-02-14 | End: 2022-02-17

## 2022-02-14 RX ORDER — HYDROMORPHONE HYDROCHLORIDE 1 MG/ML
0.4 INJECTION, SOLUTION INTRAMUSCULAR; INTRAVENOUS; SUBCUTANEOUS EVERY 2 HOUR PRN
Status: DISCONTINUED | OUTPATIENT
Start: 2022-02-14 | End: 2022-02-17

## 2022-02-14 RX ORDER — HEPARIN SODIUM AND DEXTROSE 10000; 5 [USP'U]/100ML; G/100ML
INJECTION INTRAVENOUS CONTINUOUS
Status: DISCONTINUED | OUTPATIENT
Start: 2022-02-14 | End: 2022-02-17

## 2022-02-14 RX ORDER — SODIUM CHLORIDE 9 MG/ML
INJECTION, SOLUTION INTRAVENOUS CONTINUOUS
Status: DISCONTINUED | OUTPATIENT
Start: 2022-02-14 | End: 2022-02-17

## 2022-02-14 RX ORDER — METOCLOPRAMIDE HYDROCHLORIDE 5 MG/ML
10 INJECTION INTRAMUSCULAR; INTRAVENOUS EVERY 8 HOURS PRN
Status: DISCONTINUED | OUTPATIENT
Start: 2022-02-14 | End: 2022-02-17

## 2022-02-14 RX ORDER — SODIUM PHOSPHATE, DIBASIC AND SODIUM PHOSPHATE, MONOBASIC 7; 19 G/133ML; G/133ML
1 ENEMA RECTAL ONCE AS NEEDED
Status: DISCONTINUED | OUTPATIENT
Start: 2022-02-14 | End: 2022-02-17

## 2022-02-14 RX ORDER — MORPHINE SULFATE 2 MG/ML
2 INJECTION, SOLUTION INTRAMUSCULAR; INTRAVENOUS EVERY 2 HOUR PRN
Status: DISCONTINUED | OUTPATIENT
Start: 2022-02-14 | End: 2022-02-14

## 2022-02-14 RX ORDER — HYDROCODONE BITARTRATE AND ACETAMINOPHEN 5; 325 MG/1; MG/1
1 TABLET ORAL EVERY 4 HOURS PRN
Status: DISCONTINUED | OUTPATIENT
Start: 2022-02-14 | End: 2022-02-17

## 2022-02-14 NOTE — ED QUICK NOTES
Orders for admission, patient is aware of plan and ready to go upstairs.  Any questions, please call ED RN Gladis Tamayo at extension [phone]     Patient Covid vaccination status: Fully vaccinated     COVID Test Ordered in ED: Rapid SARS-CoV-2 by PCR    COVID Suspicion at Admission: N/A    Running Infusions:  [email or web address]    Mental Status/LOC at time of transport: Alert and oriented x4,    Other pertinent information:   CIWA score: N/A   NIH score:  N/A

## 2022-02-15 ENCOUNTER — APPOINTMENT (OUTPATIENT)
Dept: INTERVENTIONAL RADIOLOGY/VASCULAR | Facility: HOSPITAL | Age: 67
DRG: 272 | End: 2022-02-15
Attending: INTERNAL MEDICINE
Payer: MEDICARE

## 2022-02-15 LAB
ALBUMIN SERPL-MCNC: 3.2 G/DL (ref 3.4–5)
ALBUMIN/GLOB SERPL: 1 {RATIO} (ref 1–2)
ALP LIVER SERPL-CCNC: 102 U/L
ALT SERPL-CCNC: 17 U/L
ANION GAP SERPL CALC-SCNC: 3 MMOL/L (ref 0–18)
APTT PPP: 97.7 SECONDS (ref 23.3–35.6)
APTT PPP: 99.3 SECONDS (ref 23.3–35.6)
AST SERPL-CCNC: 14 U/L (ref 15–37)
BILIRUB SERPL-MCNC: 0.8 MG/DL (ref 0.1–2)
BUN BLD-MCNC: 13 MG/DL (ref 7–18)
CALCIUM BLD-MCNC: 8.7 MG/DL (ref 8.5–10.1)
CHLORIDE SERPL-SCNC: 107 MMOL/L (ref 98–112)
CO2 SERPL-SCNC: 28 MMOL/L (ref 21–32)
CREAT BLD-MCNC: 0.81 MG/DL
ERYTHROCYTE [DISTWIDTH] IN BLOOD BY AUTOMATED COUNT: 13 %
GLOBULIN PLAS-MCNC: 3.2 G/DL (ref 2.8–4.4)
GLUCOSE BLD-MCNC: 125 MG/DL (ref 70–99)
HCT VFR BLD AUTO: 35 %
HGB BLD-MCNC: 10 G/DL
HGB BLD-MCNC: 11.1 G/DL
ISTAT ACTIVATED CLOTTING TIME: 202 SECONDS (ref 74–137)
MCH RBC QN AUTO: 28 PG (ref 26–34)
MCHC RBC AUTO-ENTMCNC: 31.7 G/DL (ref 31–37)
MCV RBC AUTO: 88.4 FL
OSMOLALITY SERPL CALC.SUM OF ELEC: 288 MOSM/KG (ref 275–295)
PLATELET # BLD AUTO: 229 10(3)UL (ref 150–450)
POTASSIUM SERPL-SCNC: 3.9 MMOL/L (ref 3.5–5.1)
PROT SERPL-MCNC: 6.4 G/DL (ref 6.4–8.2)
RBC # BLD AUTO: 3.96 X10(6)UL
SODIUM SERPL-SCNC: 138 MMOL/L (ref 136–145)
WBC # BLD AUTO: 7.7 X10(3) UL (ref 4–11)

## 2022-02-15 PROCEDURE — 80053 COMPREHEN METABOLIC PANEL: CPT | Performed by: HOSPITALIST

## 2022-02-15 PROCEDURE — B51CYZZ FLUOROSCOPY OF LEFT LOWER EXTREMITY VEINS USING OTHER CONTRAST: ICD-10-PCS | Performed by: INTERNAL MEDICINE

## 2022-02-15 PROCEDURE — 85730 THROMBOPLASTIN TIME PARTIAL: CPT | Performed by: HOSPITALIST

## 2022-02-15 PROCEDURE — 06CD3ZZ EXTIRPATION OF MATTER FROM LEFT COMMON ILIAC VEIN, PERCUTANEOUS APPROACH: ICD-10-PCS | Performed by: INTERNAL MEDICINE

## 2022-02-15 PROCEDURE — 37187 VENOUS MECH THROMBECTOMY: CPT

## 2022-02-15 PROCEDURE — 37253 INTRVASC US NONCORONARY ADDL: CPT

## 2022-02-15 PROCEDURE — B54CZZ3 ULTRASONOGRAPHY OF LEFT LOWER EXTREMITY VEINS, INTRAVASCULAR: ICD-10-PCS | Performed by: INTERNAL MEDICINE

## 2022-02-15 PROCEDURE — 75820 VEIN X-RAY ARM/LEG: CPT

## 2022-02-15 PROCEDURE — 37252 INTRVASC US NONCORONARY 1ST: CPT

## 2022-02-15 PROCEDURE — 85347 COAGULATION TIME ACTIVATED: CPT

## 2022-02-15 PROCEDURE — 047D3ZZ DILATION OF LEFT COMMON ILIAC ARTERY, PERCUTANEOUS APPROACH: ICD-10-PCS | Performed by: INTERNAL MEDICINE

## 2022-02-15 PROCEDURE — B51GYZZ FLUOROSCOPY OF LEFT PELVIC (ILIAC) VEINS USING OTHER CONTRAST: ICD-10-PCS | Performed by: INTERNAL MEDICINE

## 2022-02-15 PROCEDURE — 37248 TRLUML BALO ANGIOP 1ST VEIN: CPT

## 2022-02-15 PROCEDURE — 85027 COMPLETE CBC AUTOMATED: CPT | Performed by: HOSPITALIST

## 2022-02-15 PROCEDURE — 85018 HEMOGLOBIN: CPT | Performed by: INTERNAL MEDICINE

## 2022-02-15 RX ORDER — LIDOCAINE HYDROCHLORIDE 10 MG/ML
INJECTION, SOLUTION EPIDURAL; INFILTRATION; INTRACAUDAL; PERINEURAL
Status: COMPLETED
Start: 2022-02-15 | End: 2022-02-15

## 2022-02-15 RX ORDER — MIDAZOLAM HYDROCHLORIDE 1 MG/ML
INJECTION INTRAMUSCULAR; INTRAVENOUS
Status: COMPLETED
Start: 2022-02-15 | End: 2022-02-15

## 2022-02-15 RX ORDER — HEPARIN SODIUM 5000 [USP'U]/ML
INJECTION, SOLUTION INTRAVENOUS; SUBCUTANEOUS
Status: COMPLETED
Start: 2022-02-15 | End: 2022-02-15

## 2022-02-15 NOTE — PLAN OF CARE
NURSING ADMISSION NOTE      Patient admitted via Cart  Oriented to room. Safety precautions initiated. Bed in low position. Call light in reach.     Received patient A/Jose, GURPREET, NSR on tele around 1430  Admission navigator completed   Heparin gtt per DVT protocol   Swelling to Left leg, left pedal pulse palpable   Morphine and ice packs for left leg pain   and granddaughter at bedside  Plan for cath lab in AM

## 2022-02-15 NOTE — PROCEDURES
[address] [place] [address] Location: Cath Lab    CSN [id number] MRN [id number]   Admission Date 2/14/2022 Procedure Date 2/15/2022   Attending Physician Leslie Nagel MD Procedure Physician Kayla Posadas MD         PERIPHERAL VASCULAR REPORT     PREOPERATIVE DIAGNOSIS:  left iliofemoral DVT with obstructive, phlebitic syndrome  POSTOPERATIVE DIAGNOSIS:  same as above  PROCEDURE PERFORMED:  ultrasound guided left popliteal access, left selective popliteal venogram, left selective iliac venogram, peripheral intravascular ultrasound, Inari ClotTriever-assisted mechanical thrombectomy of the ilio-femoral system, PTA to the left common iliac vein    OPERATORS: Bassam Villagomez MD; Zack Stephens MD      PROCEDURE:  The patient was brought to the cardiac catheterization lab in the fasting state. Informed consent was obtained. Moderate sedation was employed using a total of IV Versed 6mg and IV fentanyl 175mcg. I directly observed the patient from  to [phone], for a total of 82 minutes, and an independent trained observer was present and assisted in the monitoring of the patient's level of consciousness and physiological status, watching the heart rate, blood pressure, oximetry, and rhythm, in addition to total moderation time. ACCESS/CATHETER PLACEMENT:  The patient was prone positioned, the left popliteal area was prepped and draped in a sterile manner, and the left popliteal vein was accessed under flouroscopic and ultrasound guidance, an 8F sheath was placed. L popliteal venography demonstrated a patent popliteal vein with extensive, non-occlusive thrombotic burden of the distal popliteal to the proximal femoral vein. Next, a Quick Cross catheter and Glidewire Advantage were advanced to the left common iliac vein.   We met resistance with wire/catheter advancement, iliac venogram was performed to define the anatomy, which assisted in advancing the wire/catheter beyond the clot to the right subclavian vein.  IVUS 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 The sheath was upsized to the 18F Inari ClotTriever sheath. Heparin was use for systemic anticoagulation. Next, the ClotTriever catheter was prepped and used to perform multiple passes of mechanical thrombectomy with extirpation of thrombotic matter, changing orientation of the catheter with each pass. Significant thrombus was retrieved with each pass, which was collected on the back table. Subtantially less thrombus was retrieved with the final pass; repeat IVUS demonstrated significantly decreased thrombus burden and continuous veinous patency to the inferior vena cava. A significant L common iliac vein stenosis was noted just before the IVC confluence; PTA was performed with an [id number] peripheral balloon. High pressure dilation to full balloon expansion was deferred as the patient experience back discomfort. Repeat venography demonstrated residual 50% narrowing but brisk flow into the IVC. The sheath was removed and hemostasis achieved with manual pressure. Overall, the procedure was tolerated well. MEDICATIONS:  See nursing record. COMPLICATIONS:  No major complications were observed during this visit to the catheterization lab. CONCLUSION: Successful IVUS mediated, mechanical thrombectomy to the left leg with an Inari ClotTriever Thrombectomy System, followed by PTA to the left common iliac vein with an [id number] balloon. RECOMMENDATIONS: Transition to oral anticoagulation with anticoagulation treatment course of 3-6 months. Consider staged stenting to the residual left common iliac vein.

## 2022-02-15 NOTE — PLAN OF CARE
Assumed care at [address].    A&Ox4, RA, NSR on tele  Voiding per bedpan  Patient on bedrest  Q2 Morphine and dilaudid given  Heparin gtt infusing   Swelling in left leg, pedal pulse palpable, and ice packs in place   Call light within reach    Patient updated on plan of care

## 2022-02-15 NOTE — PLAN OF CARE
Assumed care @0730  VSS,   A&Ox4,   RA    NSR/ST,   Pain controlled with prn Dilaudid 0.4, ice packs. Bedrest for 2 hours after procedure. Vascular checks complete. Ace wrap applied and foot elevated on pillows. Pain has decreased. NPO. Will start cardiac diet. Intake and outputs w/d/l. L popliteal incisions with gauze/tegaderm. C/D/I  IVF infusing heparin @13.5ml/hr, [email or web address]. Updated POC with patient and family. Will continue to monitor. Problem: PAIN - ADULT  Goal: Verbalizes/displays adequate comfort level or patient's stated pain goal  Description: INTERVENTIONS:  - Encourage pt to monitor pain and request assistance  - Assess pain using appropriate pain scale  - Administer analgesics based on type and severity of pain and evaluate response  - Implement non-pharmacological measures as appropriate and evaluate response  - Consider cultural and social influences on pain and pain management  - Manage/alleviate anxiety  - Utilize distraction and/or relaxation techniques  - Monitor for opioid side effects  - Notify MD/LIP if interventions unsuccessful or patient reports new pain  - Anticipate increased pain with activity and pre-medicate as appropriate  Outcome: Progressing     Problem: CARDIOVASCULAR - ADULT  Goal: Maintains optimal cardiac output and hemodynamic stability  Description: INTERVENTIONS:  - Monitor vital signs, rhythm, and trends  - Monitor for bleeding, hypotension and signs of decreased cardiac output  - Evaluate effectiveness of vasoactive medications to optimize hemodynamic stability  - Monitor arterial and/or venous puncture sites for bleeding and/or hematoma  - Assess quality of pulses, skin color and temperature  - Assess for signs of decreased coronary artery perfusion - ex.  Angina  - Evaluate fluid balance, assess for edema, trend weights  Outcome: Progressing     Problem: SKIN/TISSUE INTEGRITY - ADULT  Goal: Skin integrity remains intact  Description: INTERVENTIONS  - Assess and document risk factors for pressure ulcer development  - Assess and document skin integrity  - Monitor for areas of redness and/or skin breakdown  - Initiate interventions, skin care algorithm/standards of care as needed  Outcome: Progressing  Goal: Incision(s), wounds(s) or drain site(s) healing without S/S of infection  Description: INTERVENTIONS:  - Assess and document risk factors for pressure ulcer development  - Assess and document skin integrity  - Assess and document dressing/incision, wound bed, drain sites and surrounding tissue  - Implement wound care per orders  - Initiate isolation precautions as appropriate  - Initiate Pressure Ulcer prevention bundle as indicated  Outcome: Progressing

## 2022-02-16 LAB
ANION GAP SERPL CALC-SCNC: 5 MMOL/L (ref 0–18)
APTT PPP: 54.6 SECONDS (ref 23.3–35.6)
BUN BLD-MCNC: 8 MG/DL (ref 7–18)
CALCIUM BLD-MCNC: 8.4 MG/DL (ref 8.5–10.1)
CHLORIDE SERPL-SCNC: 110 MMOL/L (ref 98–112)
CO2 SERPL-SCNC: 23 MMOL/L (ref 21–32)
CREAT BLD-MCNC: 0.59 MG/DL
GLUCOSE BLD-MCNC: 97 MG/DL (ref 70–99)
HGB BLD-MCNC: 9.9 G/DL
OSMOLALITY SERPL CALC.SUM OF ELEC: 284 MOSM/KG (ref 275–295)
POTASSIUM SERPL-SCNC: 4.2 MMOL/L (ref 3.5–5.1)
SODIUM SERPL-SCNC: 138 MMOL/L (ref 136–145)

## 2022-02-16 PROCEDURE — 85730 THROMBOPLASTIN TIME PARTIAL: CPT | Performed by: INTERNAL MEDICINE

## 2022-02-16 PROCEDURE — 76937 US GUIDE VASCULAR ACCESS: CPT

## 2022-02-16 PROCEDURE — 36410 VNPNXR 3YR/> PHY/QHP DX/THER: CPT

## 2022-02-16 PROCEDURE — 85018 HEMOGLOBIN: CPT | Performed by: INTERNAL MEDICINE

## 2022-02-16 PROCEDURE — 80048 BASIC METABOLIC PNL TOTAL CA: CPT | Performed by: HOSPITALIST

## 2022-02-16 NOTE — PLAN OF CARE
Assumed care of patient at 0700. A/o x3   Denies Chest pain, sob, Lightheadedness, dizziness. Up and pivoting to chair with mod assist.  PT/OT consulted. Pain management. Transitioned to oral pain medication. Problem: Patient/Family Goals  Goal: Patient/Family Long Term Goal  Description: Patient's Long Term Goal:     Interventions:  -   - See additional Care Plan goals for specific interventions  Outcome: Progressing  Goal: Patient/Family Short Term Goal  Description: Patient's Short Term Goal:     Interventions:   -   - See additional Care Plan goals for specific interventions  Outcome: Progressing     Problem: PAIN - ADULT  Goal: Verbalizes/displays adequate comfort level or patient's stated pain goal  Description: INTERVENTIONS:  - Encourage pt to monitor pain and request assistance  - Assess pain using appropriate pain scale  - Administer analgesics based on type and severity of pain and evaluate response  - Implement non-pharmacological measures as appropriate and evaluate response  - Consider cultural and social influences on pain and pain management  - Manage/alleviate anxiety  - Utilize distraction and/or relaxation techniques  - Monitor for opioid side effects  - Notify MD/LIP if interventions unsuccessful or patient reports new pain  - Anticipate increased pain with activity and pre-medicate as appropriate  Outcome: Progressing     Problem: CARDIOVASCULAR - ADULT  Goal: Maintains optimal cardiac output and hemodynamic stability  Description: INTERVENTIONS:  - Monitor vital signs, rhythm, and trends  - Monitor for bleeding, hypotension and signs of decreased cardiac output  - Evaluate effectiveness of vasoactive medications to optimize hemodynamic stability  - Monitor arterial and/or venous puncture sites for bleeding and/or hematoma  - Assess quality of pulses, skin color and temperature  - Assess for signs of decreased coronary artery perfusion - ex.  Angina  - Evaluate fluid balance, assess for edema, trend weights  Outcome: Progressing     Problem: SKIN/TISSUE INTEGRITY - ADULT  Goal: Skin integrity remains intact  Description: INTERVENTIONS  - Assess and document risk factors for pressure ulcer development  - Assess and document skin integrity  - Monitor for areas of redness and/or skin breakdown  - Initiate interventions, skin care algorithm/standards of care as needed  Outcome: Progressing  Goal: Incision(s), wounds(s) or drain site(s) healing without S/S of infection  Description: INTERVENTIONS:  - Assess and document risk factors for pressure ulcer development  - Assess and document skin integrity  - Assess and document dressing/incision, wound bed, drain sites and surrounding tissue  - Implement wound care per orders  - Initiate isolation precautions as appropriate  - Initiate Pressure Ulcer prevention bundle as indicated  Outcome: Progressing

## 2022-02-17 VITALS
RESPIRATION RATE: 17 BRPM | SYSTOLIC BLOOD PRESSURE: 115 MMHG | HEIGHT: 64 IN | DIASTOLIC BLOOD PRESSURE: 65 MMHG | OXYGEN SATURATION: 99 % | HEART RATE: 91 BPM | BODY MASS INDEX: 31.24 KG/M2 | WEIGHT: 183 LBS | TEMPERATURE: 98 F

## 2022-02-17 LAB
ANION GAP SERPL CALC-SCNC: 3 MMOL/L (ref 0–18)
APTT PPP: 173 SECONDS (ref 23.3–35.6)
BUN BLD-MCNC: 6 MG/DL (ref 7–18)
CALCIUM BLD-MCNC: 8.1 MG/DL (ref 8.5–10.1)
CHLORIDE SERPL-SCNC: 113 MMOL/L (ref 98–112)
CO2 SERPL-SCNC: 26 MMOL/L (ref 21–32)
CREAT BLD-MCNC: 0.7 MG/DL
ERYTHROCYTE [DISTWIDTH] IN BLOOD BY AUTOMATED COUNT: 13 %
GLUCOSE BLD-MCNC: 109 MG/DL (ref 70–99)
HCT VFR BLD AUTO: 29 %
HGB BLD-MCNC: 9.1 G/DL
MCH RBC QN AUTO: 27.5 PG (ref 26–34)
MCHC RBC AUTO-ENTMCNC: 31.4 G/DL (ref 31–37)
MCV RBC AUTO: 87.6 FL
OSMOLALITY SERPL CALC.SUM OF ELEC: 292 MOSM/KG (ref 275–295)
PLATELET # BLD AUTO: 200 10(3)UL (ref 150–450)
POTASSIUM SERPL-SCNC: 3.8 MMOL/L (ref 3.5–5.1)
RBC # BLD AUTO: 3.31 X10(6)UL
SODIUM SERPL-SCNC: 142 MMOL/L (ref 136–145)
WBC # BLD AUTO: 5.3 X10(3) UL (ref 4–11)

## 2022-02-17 PROCEDURE — 85027 COMPLETE CBC AUTOMATED: CPT | Performed by: HOSPITALIST

## 2022-02-17 PROCEDURE — 97166 OT EVAL MOD COMPLEX 45 MIN: CPT

## 2022-02-17 PROCEDURE — 97116 GAIT TRAINING THERAPY: CPT

## 2022-02-17 PROCEDURE — 97161 PT EVAL LOW COMPLEX 20 MIN: CPT

## 2022-02-17 PROCEDURE — 80048 BASIC METABOLIC PNL TOTAL CA: CPT | Performed by: HOSPITALIST

## 2022-02-17 PROCEDURE — 97530 THERAPEUTIC ACTIVITIES: CPT

## 2022-02-17 PROCEDURE — 85730 THROMBOPLASTIN TIME PARTIAL: CPT | Performed by: INTERNAL MEDICINE

## 2022-02-17 RX ORDER — HYDROCODONE BITARTRATE AND ACETAMINOPHEN 5; 325 MG/1; MG/1
1 TABLET ORAL EVERY 4 HOURS PRN
Qty: 20 TABLET | Refills: 0 | Status: ON HOLD | OUTPATIENT
Start: 2022-02-17 | End: 2022-02-23

## 2022-02-17 NOTE — PLAN OF CARE
Problem: PAIN - ADULT  Goal: Verbalizes/displays adequate comfort level or patient's stated pain goal  Description: INTERVENTIONS:  - Encourage pt to monitor pain and request assistance  - Assess pain using appropriate pain scale  - Administer analgesics based on type and severity of pain and evaluate response  - Implement non-pharmacological measures as appropriate and evaluate response  - Consider cultural and social influences on pain and pain management  - Manage/alleviate anxiety  - Utilize distraction and/or relaxation techniques  - Monitor for opioid side effects  - Notify MD/LIP if interventions unsuccessful or patient reports new pain  - Anticipate increased pain with activity and pre-medicate as appropriate  Outcome: Progressing

## 2022-02-17 NOTE — PLAN OF CARE
Assumed care at [address]. Pt a/ox4. VSS. NSR per tele. O2 at 2l/nc applied, SPO2 80's on RA while asleep. Norco prn given for pain with relief. Heparin gtt infusing per tele. Pt updated with POC. Call light in reach. Needs attended to.

## 2022-02-17 NOTE — PLAN OF CARE
I assumed care of this patient at 0730. Pt is AAOx4, RA, NSR, afebrile and denies pain. POC is discussed. Pt's heparin gtt is adjusted with the nightshift nurse. Pt denies any needs at this time. Call light is within reach.

## 2022-02-22 ENCOUNTER — HOSPITAL ENCOUNTER (INPATIENT)
Facility: HOSPITAL | Age: 67
LOS: 5 days | Discharge: HOME HEALTH | DRG: 253 | End: 2022-02-27
Attending: EMERGENCY MEDICINE | Admitting: INTERNAL MEDICINE
Payer: MEDICARE

## 2022-02-22 DIAGNOSIS — I82.412 ACUTE DEEP VEIN THROMBOSIS (DVT) OF FEMORAL VEIN OF LEFT LOWER EXTREMITY (HCC): Primary | ICD-10-CM

## 2022-02-22 PROBLEM — D64.9 ANEMIA: Status: ACTIVE | Noted: 2022-02-22

## 2022-02-22 PROBLEM — I82.409 DVT (DEEP VENOUS THROMBOSIS) (HCC): Status: ACTIVE | Noted: 2022-02-22

## 2022-02-22 PROBLEM — E87.6 HYPOKALEMIA: Status: ACTIVE | Noted: 2022-02-22

## 2022-02-22 PROBLEM — R73.9 HYPERGLYCEMIA: Status: ACTIVE | Noted: 2022-02-22

## 2022-02-22 LAB
ALBUMIN SERPL-MCNC: 3.1 G/DL (ref 3.4–5)
ALBUMIN/GLOB SERPL: 0.8 {RATIO} (ref 1–2)
ALP LIVER SERPL-CCNC: 99 U/L
ALT SERPL-CCNC: 51 U/L
ANION GAP SERPL CALC-SCNC: 6 MMOL/L (ref 0–18)
APTT PPP: 38.7 SECONDS (ref 23.3–35.6)
AST SERPL-CCNC: 35 U/L (ref 15–37)
BASOPHILS # BLD AUTO: 0.05 X10(3) UL (ref 0–0.2)
BASOPHILS NFR BLD AUTO: 0.6 %
BILIRUB SERPL-MCNC: 0.4 MG/DL (ref 0.1–2)
BUN BLD-MCNC: 8 MG/DL (ref 7–18)
CALCIUM BLD-MCNC: 9.1 MG/DL (ref 8.5–10.1)
CHLORIDE SERPL-SCNC: 109 MMOL/L (ref 98–112)
CO2 SERPL-SCNC: 27 MMOL/L (ref 21–32)
CREAT BLD-MCNC: 0.89 MG/DL
EOSINOPHIL # BLD AUTO: 0.17 X10(3) UL (ref 0–0.7)
EOSINOPHIL NFR BLD AUTO: 2.2 %
ERYTHROCYTE [DISTWIDTH] IN BLOOD BY AUTOMATED COUNT: 13.7 %
GLOBULIN PLAS-MCNC: 3.8 G/DL (ref 2.8–4.4)
GLUCOSE BLD-MCNC: 137 MG/DL (ref 70–99)
HCT VFR BLD AUTO: 31 %
HGB BLD-MCNC: 9.9 G/DL
IMM GRANULOCYTES # BLD AUTO: 0.02 X10(3) UL (ref 0–1)
IMM GRANULOCYTES NFR BLD: 0.3 %
INR BLD: 1.54 (ref 0.8–1.2)
LYMPHOCYTES # BLD AUTO: 1.38 X10(3) UL (ref 1–4)
LYMPHOCYTES NFR BLD AUTO: 17.5 %
MCH RBC QN AUTO: 28 PG (ref 26–34)
MCHC RBC AUTO-ENTMCNC: 31.9 G/DL (ref 31–37)
MCV RBC AUTO: 87.8 FL
MONOCYTES # BLD AUTO: 0.4 X10(3) UL (ref 0.1–1)
MONOCYTES NFR BLD AUTO: 5.1 %
NEUTROPHILS # BLD AUTO: 5.87 X10 (3) UL (ref 1.5–7.7)
NEUTROPHILS # BLD AUTO: 5.87 X10(3) UL (ref 1.5–7.7)
NEUTROPHILS NFR BLD AUTO: 74.3 %
OSMOLALITY SERPL CALC.SUM OF ELEC: 294 MOSM/KG (ref 275–295)
PLATELET # BLD AUTO: 357 10(3)UL (ref 150–450)
POTASSIUM SERPL-SCNC: 3.5 MMOL/L (ref 3.5–5.1)
PROT SERPL-MCNC: 6.9 G/DL (ref 6.4–8.2)
PROTHROMBIN TIME: 18.6 SECONDS (ref 11.6–14.8)
RBC # BLD AUTO: 3.53 X10(6)UL
SODIUM SERPL-SCNC: 142 MMOL/L (ref 136–145)
WBC # BLD AUTO: 7.9 X10(3) UL (ref 4–11)

## 2022-02-22 PROCEDURE — 99285 EMERGENCY DEPT VISIT HI MDM: CPT

## 2022-02-22 PROCEDURE — 96375 TX/PRO/DX INJ NEW DRUG ADDON: CPT

## 2022-02-22 PROCEDURE — 85730 THROMBOPLASTIN TIME PARTIAL: CPT | Performed by: EMERGENCY MEDICINE

## 2022-02-22 PROCEDURE — 80053 COMPREHEN METABOLIC PANEL: CPT | Performed by: EMERGENCY MEDICINE

## 2022-02-22 PROCEDURE — 85025 COMPLETE CBC W/AUTO DIFF WBC: CPT | Performed by: EMERGENCY MEDICINE

## 2022-02-22 PROCEDURE — 85610 PROTHROMBIN TIME: CPT | Performed by: EMERGENCY MEDICINE

## 2022-02-22 PROCEDURE — 96365 THER/PROPH/DIAG IV INF INIT: CPT

## 2022-02-22 RX ORDER — MORPHINE SULFATE 2 MG/ML
1 INJECTION, SOLUTION INTRAMUSCULAR; INTRAVENOUS EVERY 2 HOUR PRN
Status: DISCONTINUED | OUTPATIENT
Start: 2022-02-22 | End: 2022-02-27

## 2022-02-22 RX ORDER — MORPHINE SULFATE 2 MG/ML
0.5 INJECTION, SOLUTION INTRAMUSCULAR; INTRAVENOUS EVERY 2 HOUR PRN
Status: DISCONTINUED | OUTPATIENT
Start: 2022-02-22 | End: 2022-02-27

## 2022-02-22 RX ORDER — HYDROCODONE BITARTRATE AND ACETAMINOPHEN 10; 325 MG/1; MG/1
1 TABLET ORAL EVERY 6 HOURS PRN
Status: DISCONTINUED | OUTPATIENT
Start: 2022-02-22 | End: 2022-02-24

## 2022-02-22 RX ORDER — HEPARIN SODIUM AND DEXTROSE 10000; 5 [USP'U]/100ML; G/100ML
INJECTION INTRAVENOUS CONTINUOUS
Status: DISCONTINUED | OUTPATIENT
Start: 2022-02-23 | End: 2022-02-23

## 2022-02-22 RX ORDER — HYDROMORPHONE HYDROCHLORIDE 1 MG/ML
0.5 INJECTION, SOLUTION INTRAMUSCULAR; INTRAVENOUS; SUBCUTANEOUS EVERY 30 MIN PRN
Status: DISCONTINUED | OUTPATIENT
Start: 2022-02-22 | End: 2022-02-22

## 2022-02-22 RX ORDER — ACETAMINOPHEN 325 MG/1
650 TABLET ORAL EVERY 6 HOURS PRN
Status: DISCONTINUED | OUTPATIENT
Start: 2022-02-22 | End: 2022-02-27

## 2022-02-22 RX ORDER — ONDANSETRON 2 MG/ML
4 INJECTION INTRAMUSCULAR; INTRAVENOUS EVERY 6 HOURS PRN
Status: DISCONTINUED | OUTPATIENT
Start: 2022-02-22 | End: 2022-02-27

## 2022-02-22 RX ORDER — MORPHINE SULFATE 2 MG/ML
2 INJECTION, SOLUTION INTRAMUSCULAR; INTRAVENOUS EVERY 2 HOUR PRN
Status: DISCONTINUED | OUTPATIENT
Start: 2022-02-22 | End: 2022-02-27

## 2022-02-22 RX ORDER — METOCLOPRAMIDE HYDROCHLORIDE 5 MG/ML
10 INJECTION INTRAMUSCULAR; INTRAVENOUS EVERY 8 HOURS PRN
Status: DISCONTINUED | OUTPATIENT
Start: 2022-02-22 | End: 2022-02-27

## 2022-02-22 RX ORDER — HYDROMORPHONE HYDROCHLORIDE 1 MG/ML
0.5 INJECTION, SOLUTION INTRAMUSCULAR; INTRAVENOUS; SUBCUTANEOUS EVERY 30 MIN PRN
Status: DISCONTINUED | OUTPATIENT
Start: 2022-02-22 | End: 2022-02-23

## 2022-02-22 RX ORDER — HEPARIN SODIUM AND DEXTROSE 10000; 5 [USP'U]/100ML; G/100ML
18 INJECTION INTRAVENOUS ONCE
Status: COMPLETED | OUTPATIENT
Start: 2022-02-22 | End: 2022-02-23

## 2022-02-22 RX ORDER — ONDANSETRON 2 MG/ML
4 INJECTION INTRAMUSCULAR; INTRAVENOUS EVERY 4 HOURS PRN
Status: DISCONTINUED | OUTPATIENT
Start: 2022-02-22 | End: 2022-02-22

## 2022-02-22 NOTE — ED INITIAL ASSESSMENT (HPI)
Patient recently admitted with LLE DVT. Patient states she was discharged on blood thinners but pain to leg never went away and swelling worsening up left thigh and now having left sided abdominal pain.

## 2022-02-23 ENCOUNTER — HOSPITAL ENCOUNTER (INPATIENT)
Dept: INTERVENTIONAL RADIOLOGY/VASCULAR | Facility: HOSPITAL | Age: 67
Discharge: HOME | DRG: 253 | End: 2022-02-23
Attending: INTERNAL MEDICINE
Payer: MEDICARE

## 2022-02-23 ENCOUNTER — APPOINTMENT (OUTPATIENT)
Dept: INTERVENTIONAL RADIOLOGY/VASCULAR | Facility: HOSPITAL | Age: 67
DRG: 253 | End: 2022-02-23
Attending: INTERNAL MEDICINE
Payer: MEDICARE

## 2022-02-23 DIAGNOSIS — I82.409 DEEP VEIN THROMBOSIS (DVT) (HCC): ICD-10-CM

## 2022-02-23 LAB
ANION GAP SERPL CALC-SCNC: 4 MMOL/L (ref 0–18)
APTT PPP: 113.6 SECONDS (ref 23.3–35.6)
APTT PPP: 44.9 SECONDS (ref 23.3–35.6)
APTT PPP: 91.4 SECONDS (ref 23.3–35.6)
BASOPHILS # BLD AUTO: 0.04 X10(3) UL (ref 0–0.2)
BASOPHILS NFR BLD AUTO: 0.7 %
BUN BLD-MCNC: 8 MG/DL (ref 7–18)
CALCIUM BLD-MCNC: 8.8 MG/DL (ref 8.5–10.1)
CHLORIDE SERPL-SCNC: 110 MMOL/L (ref 98–112)
CO2 SERPL-SCNC: 29 MMOL/L (ref 21–32)
CREAT BLD-MCNC: 0.8 MG/DL
EOSINOPHIL # BLD AUTO: 0.22 X10(3) UL (ref 0–0.7)
EOSINOPHIL NFR BLD AUTO: 3.8 %
ERYTHROCYTE [DISTWIDTH] IN BLOOD BY AUTOMATED COUNT: 13.7 %
ERYTHROCYTE [DISTWIDTH] IN BLOOD BY AUTOMATED COUNT: 13.9 %
FIBRINOGEN PPP-MCNC: 330 MG/DL (ref 180–480)
GLUCOSE BLD-MCNC: 96 MG/DL (ref 70–99)
HCT VFR BLD AUTO: 25.8 %
HCT VFR BLD AUTO: 29.2 %
HGB BLD-MCNC: 8.4 G/DL
HGB BLD-MCNC: 9.3 G/DL
IMM GRANULOCYTES # BLD AUTO: 0.03 X10(3) UL (ref 0–1)
IMM GRANULOCYTES NFR BLD: 0.5 %
INR BLD: 1.28 (ref 0.8–1.2)
ISTAT ACTIVATED CLOTTING TIME: 160 SECONDS (ref 74–137)
LYMPHOCYTES # BLD AUTO: 2.49 X10(3) UL (ref 1–4)
MCH RBC QN AUTO: 27.8 PG (ref 26–34)
MCH RBC QN AUTO: 28.4 PG (ref 26–34)
MCHC RBC AUTO-ENTMCNC: 31.8 G/DL (ref 31–37)
MCHC RBC AUTO-ENTMCNC: 32.6 G/DL (ref 31–37)
MCV RBC AUTO: 87.2 FL
MCV RBC AUTO: 87.2 FL
MONOCYTES # BLD AUTO: 0.35 X10(3) UL (ref 0.1–1)
MONOCYTES NFR BLD AUTO: 6.1 %
NEUTROPHILS # BLD AUTO: 2.61 X10 (3) UL (ref 1.5–7.7)
NEUTROPHILS # BLD AUTO: 2.61 X10(3) UL (ref 1.5–7.7)
OSMOLALITY SERPL CALC.SUM OF ELEC: 294 MOSM/KG (ref 275–295)
PLATELET # BLD AUTO: 291 10(3)UL (ref 150–450)
PLATELET # BLD AUTO: 350 10(3)UL (ref 150–450)
POTASSIUM SERPL-SCNC: 4 MMOL/L (ref 3.5–5.1)
PROTHROMBIN TIME: 16 SECONDS (ref 11.6–14.8)
RBC # BLD AUTO: 2.96 X10(6)UL
RBC # BLD AUTO: 3.35 X10(6)UL
SODIUM SERPL-SCNC: 143 MMOL/L (ref 136–145)
WBC # BLD AUTO: 5.7 X10(3) UL (ref 4–11)
WBC # BLD AUTO: 5.7 X10(3) UL (ref 4–11)

## 2022-02-23 PROCEDURE — 3E05317 INTRODUCTION OF OTHER THROMBOLYTIC INTO PERIPHERAL ARTERY, PERCUTANEOUS APPROACH: ICD-10-PCS | Performed by: INTERNAL MEDICINE

## 2022-02-23 PROCEDURE — 80048 BASIC METABOLIC PNL TOTAL CA: CPT | Performed by: INTERNAL MEDICINE

## 2022-02-23 PROCEDURE — B51CYZZ FLUOROSCOPY OF LEFT LOWER EXTREMITY VEINS USING OTHER CONTRAST: ICD-10-PCS | Performed by: INTERNAL MEDICINE

## 2022-02-23 PROCEDURE — 85027 COMPLETE CBC AUTOMATED: CPT | Performed by: INTERNAL MEDICINE

## 2022-02-23 PROCEDURE — 85730 THROMBOPLASTIN TIME PARTIAL: CPT | Performed by: INTERNAL MEDICINE

## 2022-02-23 PROCEDURE — 85025 COMPLETE CBC W/AUTO DIFF WBC: CPT | Performed by: INTERNAL MEDICINE

## 2022-02-23 PROCEDURE — 85384 FIBRINOGEN ACTIVITY: CPT | Performed by: INTERNAL MEDICINE

## 2022-02-23 PROCEDURE — 85347 COAGULATION TIME ACTIVATED: CPT

## 2022-02-23 PROCEDURE — 06FN3Z0 FRAGMENTATION OF LEFT FEMORAL VEIN, PERCUTANEOUS APPROACH, ULTRASONIC: ICD-10-PCS | Performed by: INTERNAL MEDICINE

## 2022-02-23 PROCEDURE — 37248 TRLUML BALO ANGIOP 1ST VEIN: CPT

## 2022-02-23 PROCEDURE — 06FD3Z0 FRAGMENTATION OF LEFT COMMON ILIAC VEIN, PERCUTANEOUS APPROACH, ULTRASONIC: ICD-10-PCS | Performed by: INTERNAL MEDICINE

## 2022-02-23 PROCEDURE — 85730 THROMBOPLASTIN TIME PARTIAL: CPT | Performed by: EMERGENCY MEDICINE

## 2022-02-23 PROCEDURE — 99152 MOD SED SAME PHYS/QHP 5/>YRS: CPT

## 2022-02-23 PROCEDURE — 37212 THROMBOLYTIC VENOUS THERAPY: CPT

## 2022-02-23 PROCEDURE — 99153 MOD SED SAME PHYS/QHP EA: CPT

## 2022-02-23 PROCEDURE — 75820 VEIN X-RAY ARM/LEG: CPT

## 2022-02-23 PROCEDURE — 85610 PROTHROMBIN TIME: CPT | Performed by: INTERNAL MEDICINE

## 2022-02-23 RX ORDER — HEPARIN SODIUM 5000 [USP'U]/ML
INJECTION, SOLUTION INTRAVENOUS; SUBCUTANEOUS
Status: COMPLETED
Start: 2022-02-23 | End: 2022-02-23

## 2022-02-23 RX ORDER — MIDAZOLAM HYDROCHLORIDE 1 MG/ML
INJECTION INTRAMUSCULAR; INTRAVENOUS
Status: COMPLETED
Start: 2022-02-23 | End: 2022-02-23

## 2022-02-23 RX ORDER — SODIUM CHLORIDE 9 MG/ML
INJECTION, SOLUTION INTRAVENOUS CONTINUOUS
Status: DISCONTINUED | OUTPATIENT
Start: 2022-02-23 | End: 2022-02-24

## 2022-02-23 RX ORDER — LIDOCAINE HYDROCHLORIDE 10 MG/ML
INJECTION, SOLUTION EPIDURAL; INFILTRATION; INTRACAUDAL; PERINEURAL
Status: COMPLETED
Start: 2022-02-23 | End: 2022-02-23

## 2022-02-23 NOTE — PROGRESS NOTES
Endo:    Bilateral femoral venous access. 6F in both veins. Up and over after antegrade from left femoral vein and plasty with 6mm balloon. Catheter down to pop vein and injected. Some saphenous vein thrombus but widely patent. Left common vein and iliac vein occlusded with thrombus. 7F sheath from right up and over. Then 9mm balloon and established patency. EKOS 300mm treatment zone placed from right vein down to left pop vein. 10mg TPA given bolus. Drip TPA 1mg/hr (order placed) and heparin 200U. hr via sheath. Full dose heparin drip discontinued. PLAN: EKOS/TPA for 12 hrs. Tomorrow re-look and likely stent iliac on left with venous stent. Patient tolerated procedure well but did require significant sedation. Needs to be in ICU overnight and pain control. Call myself or Dr. Mahajan Ivral if any questions. Dictation to follow.     ANITRA ANGELES

## 2022-02-23 NOTE — ED QUICK NOTES
Orders for admission, patient is aware of plan and ready to go upstairs. Any questions, please call ED RN Latasha at extension [phone]. Patient Covid vaccination status: Fully vaccinated     COVID Test Ordered in ED: None    COVID Suspicion at Admission: N/A    Running Infusions: Heparin at 15mL/hr    Mental Status/LOC at time of transport: A&OX4    Other pertinent information: LLE edema, redness, and pain. Received IV dilaudid.    CIWA score: N/A   NIH score:  N/A

## 2022-02-23 NOTE — PLAN OF CARE
Patient transferred to CCU. Patient's family notified and taken to waiting room on 6th floor with patient's belongings.

## 2022-02-23 NOTE — ED QUICK NOTES
THIS WRITER CALLED CHARGE PHONE [phone] TO ASK THAT NURSE GET ASSIGNED TO THIS PT, CHARGE RN STATES I NEED TO TALK TO SUPERVISOR. I EXPLAINED I DON'T NEED TO CHANGE ANYTHING, JUST NEED RN TO BE ASSIGNED. TRANSPORT PUT IN.

## 2022-02-23 NOTE — PLAN OF CARE
GCS 15. VSS. PRN Morphine given for LLE pain with relief. LLE remains edematous, warm to touch. Doppler utilized for pulses to LLE. Previous puncture site to left popliteal is healing. Scant amount for serous drainage noted. Heparin gtt continued. PTT therapeutic and ordered for a.m. NSR on telemetry. Voiding without difficulty. Remains free from falls and injuries. Patient's family at bedside and notified of patient condition and POC. Plan is for repeat thrombectomy this afternoon. Problem: PAIN - ADULT  Goal: Verbalizes/displays adequate comfort level or patient's stated pain goal  Description: INTERVENTIONS:  - Encourage pt to monitor pain and request assistance  - Assess pain using appropriate pain scale  - Administer analgesics based on type and severity of pain and evaluate response  - Implement non-pharmacological measures as appropriate and evaluate response  - Consider cultural and social influences on pain and pain management  - Manage/alleviate anxiety  - Utilize distraction and/or relaxation techniques  - Monitor for opioid side effects  - Notify MD/LIP if interventions unsuccessful or patient reports new pain  - Anticipate increased pain with activity and pre-medicate as appropriate  Outcome: Progressing     Problem: RISK FOR INFECTION - ADULT  Goal: Absence of fever/infection during anticipated neutropenic period  Description: INTERVENTIONS  - Monitor WBC  - Administer growth factors as ordered  - Implement neutropenic guidelines  Outcome: Progressing     Problem: SAFETY ADULT - FALL  Goal: Free from fall injury  Description: INTERVENTIONS:  - Assess pt frequently for physical needs  - Identify cognitive and physical deficits and behaviors that affect risk of falls.   - [place] fall precautions as indicated by assessment.  - Educate pt/family on patient safety including physical limitations  - Instruct pt to call for assistance with activity based on assessment  - Modify environment to reduce risk of injury  - Provide assistive devices as appropriate  - Consider OT/PT consult to assist with strengthening/mobility  - Encourage toileting schedule  Outcome: Progressing     Problem: DISCHARGE PLANNING  Goal: Discharge to home or other facility with appropriate resources  Description: INTERVENTIONS:  - Identify barriers to discharge w/pt and caregiver  - Include patient/family/discharge partner in discharge planning  - Arrange for needed discharge resources and transportation as appropriate  - Identify discharge learning needs (meds, wound care, etc)  - Arrange for interpreters to assist at discharge as needed  - Consider post-discharge preferences of patient/family/discharge partner  - Complete POLST form as appropriate  - Assess patient's ability to be responsible for managing their own health  - Refer to Case Management Department for coordinating discharge planning if the patient needs post-hospital services based on physician/LIP order or complex needs related to functional status, cognitive ability or social support system  Outcome: Progressing

## 2022-02-24 ENCOUNTER — APPOINTMENT (OUTPATIENT)
Dept: INTERVENTIONAL RADIOLOGY/VASCULAR | Facility: HOSPITAL | Age: 67
DRG: 253 | End: 2022-02-24
Attending: INTERNAL MEDICINE
Payer: MEDICARE

## 2022-02-24 LAB
ANION GAP SERPL CALC-SCNC: 4 MMOL/L (ref 0–18)
APTT PPP: 34 SECONDS (ref 23.3–35.6)
APTT PPP: 42.5 SECONDS (ref 23.3–35.6)
APTT PPP: 44.2 SECONDS (ref 23.3–35.6)
BASOPHILS # BLD AUTO: 0.03 X10(3) UL (ref 0–0.2)
BASOPHILS NFR BLD AUTO: 0.6 %
BUN BLD-MCNC: 8 MG/DL (ref 7–18)
CALCIUM BLD-MCNC: 7.9 MG/DL (ref 8.5–10.1)
CHLORIDE SERPL-SCNC: 112 MMOL/L (ref 98–112)
CO2 SERPL-SCNC: 26 MMOL/L (ref 21–32)
CREAT BLD-MCNC: 0.74 MG/DL
EOSINOPHIL # BLD AUTO: 0.17 X10(3) UL (ref 0–0.7)
EOSINOPHIL NFR BLD AUTO: 3.2 %
ERYTHROCYTE [DISTWIDTH] IN BLOOD BY AUTOMATED COUNT: 13.6 %
ERYTHROCYTE [DISTWIDTH] IN BLOOD BY AUTOMATED COUNT: 13.8 %
ERYTHROCYTE [DISTWIDTH] IN BLOOD BY AUTOMATED COUNT: 13.8 %
FIBRINOGEN PPP-MCNC: 184 MG/DL (ref 180–480)
FIBRINOGEN PPP-MCNC: 233 MG/DL (ref 180–480)
FIBRINOGEN PPP-MCNC: 284 MG/DL (ref 180–480)
GLUCOSE BLD-MCNC: 100 MG/DL (ref 70–99)
HCT VFR BLD AUTO: 25 %
HCT VFR BLD AUTO: 26.9 %
HCT VFR BLD AUTO: 27.2 %
HCT VFR BLD AUTO: 27.2 %
HGB BLD-MCNC: 7.6 G/DL
HGB BLD-MCNC: 8.3 G/DL
HGB BLD-MCNC: 8.3 G/DL
HGB BLD-MCNC: 8.7 G/DL
IMM GRANULOCYTES # BLD AUTO: 0.02 X10(3) UL (ref 0–1)
IMM GRANULOCYTES NFR BLD: 0.4 %
INR BLD: 1.34 (ref 0.8–1.2)
INR BLD: 1.41 (ref 0.8–1.2)
INR BLD: 1.43 (ref 0.8–1.2)
LYMPHOCYTES # BLD AUTO: 1.25 X10(3) UL (ref 1–4)
LYMPHOCYTES NFR BLD AUTO: 23.5 %
MCH RBC QN AUTO: 26.9 PG (ref 26–34)
MCH RBC QN AUTO: 28 PG (ref 26–34)
MCHC RBC AUTO-ENTMCNC: 30.4 G/DL (ref 31–37)
MCHC RBC AUTO-ENTMCNC: 30.5 G/DL (ref 31–37)
MCHC RBC AUTO-ENTMCNC: 30.5 G/DL (ref 31–37)
MCHC RBC AUTO-ENTMCNC: 32.3 G/DL (ref 31–37)
MCV RBC AUTO: 86.5 FL
MCV RBC AUTO: 88.3 FL
MONOCYTES # BLD AUTO: 0.44 X10(3) UL (ref 0.1–1)
MONOCYTES NFR BLD AUTO: 8.3 %
NEUTROPHILS # BLD AUTO: 3.4 X10 (3) UL (ref 1.5–7.7)
NEUTROPHILS # BLD AUTO: 3.4 X10(3) UL (ref 1.5–7.7)
NEUTROPHILS NFR BLD AUTO: 64 %
OSMOLALITY SERPL CALC.SUM OF ELEC: 292 MOSM/KG (ref 275–295)
PLATELET # BLD AUTO: 234 10(3)UL (ref 150–450)
PLATELET # BLD AUTO: 280 10(3)UL (ref 150–450)
PLATELET # BLD AUTO: 280 10(3)UL (ref 150–450)
PLATELET # BLD AUTO: 286 10(3)UL (ref 150–450)
POTASSIUM SERPL-SCNC: 3.7 MMOL/L (ref 3.5–5.1)
PROTHROMBIN TIME: 16.6 SECONDS (ref 11.6–14.8)
PROTHROMBIN TIME: 17.3 SECONDS (ref 11.6–14.8)
PROTHROMBIN TIME: 17.5 SECONDS (ref 11.6–14.8)
RBC # BLD AUTO: 3.08 X10(6)UL
RBC # BLD AUTO: 3.08 X10(6)UL
RBC # BLD AUTO: 3.11 X10(6)UL
SODIUM SERPL-SCNC: 142 MMOL/L (ref 136–145)
WBC # BLD AUTO: 4.7 X10(3) UL (ref 4–11)
WBC # BLD AUTO: 5.2 X10(3) UL (ref 4–11)
WBC # BLD AUTO: 5.3 X10(3) UL (ref 4–11)
WBC # BLD AUTO: 5.3 X10(3) UL (ref 4–11)

## 2022-02-24 PROCEDURE — 99153 MOD SED SAME PHYS/QHP EA: CPT

## 2022-02-24 PROCEDURE — 37214 CESSJ THERAPY CATH REMOVAL: CPT

## 2022-02-24 PROCEDURE — 85025 COMPLETE CBC W/AUTO DIFF WBC: CPT | Performed by: INTERNAL MEDICINE

## 2022-02-24 PROCEDURE — 75822 VEIN X-RAY ARMS/LEGS: CPT

## 2022-02-24 PROCEDURE — 85384 FIBRINOGEN ACTIVITY: CPT | Performed by: INTERNAL MEDICINE

## 2022-02-24 PROCEDURE — 85049 AUTOMATED PLATELET COUNT: CPT | Performed by: INTERNAL MEDICINE

## 2022-02-24 PROCEDURE — 06H03DZ INSERTION OF INTRALUMINAL DEVICE INTO INFERIOR VENA CAVA, PERCUTANEOUS APPROACH: ICD-10-PCS | Performed by: INTERNAL MEDICINE

## 2022-02-24 PROCEDURE — 067D3DZ DILATION OF LEFT COMMON ILIAC VEIN WITH INTRALUMINAL DEVICE, PERCUTANEOUS APPROACH: ICD-10-PCS | Performed by: INTERNAL MEDICINE

## 2022-02-24 PROCEDURE — 85730 THROMBOPLASTIN TIME PARTIAL: CPT | Performed by: INTERNAL MEDICINE

## 2022-02-24 PROCEDURE — 99152 MOD SED SAME PHYS/QHP 5/>YRS: CPT

## 2022-02-24 PROCEDURE — B51DYZZ FLUOROSCOPY OF BILATERAL LOWER EXTREMITY VEINS USING OTHER CONTRAST: ICD-10-PCS | Performed by: INTERNAL MEDICINE

## 2022-02-24 PROCEDURE — 37252 INTRVASC US NONCORONARY 1ST: CPT

## 2022-02-24 PROCEDURE — 37191 INS ENDOVAS VENA CAVA FILTR: CPT

## 2022-02-24 PROCEDURE — 85027 COMPLETE CBC AUTOMATED: CPT | Performed by: INTERNAL MEDICINE

## 2022-02-24 PROCEDURE — B519YZZ FLUOROSCOPY OF INFERIOR VENA CAVA USING OTHER CONTRAST: ICD-10-PCS | Performed by: INTERNAL MEDICINE

## 2022-02-24 PROCEDURE — 80048 BASIC METABOLIC PNL TOTAL CA: CPT | Performed by: INTERNAL MEDICINE

## 2022-02-24 PROCEDURE — B54CZZ3 ULTRASONOGRAPHY OF LEFT LOWER EXTREMITY VEINS, INTRAVASCULAR: ICD-10-PCS | Performed by: INTERNAL MEDICINE

## 2022-02-24 PROCEDURE — 85610 PROTHROMBIN TIME: CPT | Performed by: INTERNAL MEDICINE

## 2022-02-24 RX ORDER — CEFAZOLIN SODIUM/WATER 2 G/20 ML
SYRINGE (ML) INTRAVENOUS
Status: COMPLETED
Start: 2022-02-24 | End: 2022-02-24

## 2022-02-24 RX ORDER — LIDOCAINE HYDROCHLORIDE 10 MG/ML
INJECTION, SOLUTION EPIDURAL; INFILTRATION; INTRACAUDAL; PERINEURAL
Status: COMPLETED
Start: 2022-02-24 | End: 2022-02-24

## 2022-02-24 RX ORDER — MIDAZOLAM HYDROCHLORIDE 1 MG/ML
INJECTION INTRAMUSCULAR; INTRAVENOUS
Status: COMPLETED
Start: 2022-02-24 | End: 2022-02-24

## 2022-02-24 RX ORDER — HEPARIN SODIUM AND DEXTROSE 10000; 5 [USP'U]/100ML; G/100ML
INJECTION INTRAVENOUS
Status: DISCONTINUED
Start: 2022-02-24 | End: 2022-02-24 | Stop reason: WASHOUT

## 2022-02-24 RX ORDER — HEPARIN SODIUM 5000 [USP'U]/ML
INJECTION, SOLUTION INTRAVENOUS; SUBCUTANEOUS
Status: COMPLETED
Start: 2022-02-24 | End: 2022-02-24

## 2022-02-24 RX ORDER — HYDROCODONE BITARTRATE AND ACETAMINOPHEN 10; 325 MG/1; MG/1
2 TABLET ORAL EVERY 4 HOURS PRN
Status: DISCONTINUED | OUTPATIENT
Start: 2022-02-24 | End: 2022-02-27

## 2022-02-24 RX ORDER — ASPIRIN 81 MG/1
81 TABLET ORAL DAILY
Status: DISCONTINUED | OUTPATIENT
Start: 2022-02-24 | End: 2022-02-27

## 2022-02-24 NOTE — PLAN OF CARE
Pt is awake and alert. NSR, afebrile, and on RA. EKOS running through R groin. L groin has fluids to keep open. Both groin sites are clean, dry, intact. LE pulses are by doppler. Left leg and feet are still warmer and more swollen than the right. PRN morphine and norco given for pain in left hip and leg. Spouse at bedside and updated. Will continue to monitor. Problem: PAIN - ADULT  Goal: Verbalizes/displays adequate comfort level or patient's stated pain goal  Description: INTERVENTIONS:  - Encourage pt to monitor pain and request assistance  - Assess pain using appropriate pain scale  - Administer analgesics based on type and severity of pain and evaluate response  - Implement non-pharmacological measures as appropriate and evaluate response  - Consider cultural and social influences on pain and pain management  - Manage/alleviate anxiety  - Utilize distraction and/or relaxation techniques  - Monitor for opioid side effects  - Notify MD/LIP if interventions unsuccessful or patient reports new pain  - Anticipate increased pain with activity and pre-medicate as appropriate  Outcome: Progressing     Problem: SAFETY ADULT - FALL  Goal: Free from fall injury  Description: INTERVENTIONS:  - Assess pt frequently for physical needs  - Identify cognitive and physical deficits and behaviors that affect risk of falls.   - [place] fall precautions as indicated by assessment.  - Educate pt/family on patient safety including physical limitations  - Instruct pt to call for assistance with activity based on assessment  - Modify environment to reduce risk of injury  - Provide assistive devices as appropriate  - Consider OT/PT consult to assist with strengthening/mobility  - Encourage toileting schedule  Outcome: Progressing

## 2022-02-25 LAB
ANION GAP SERPL CALC-SCNC: 6 MMOL/L (ref 0–18)
BUN BLD-MCNC: 5 MG/DL (ref 7–18)
CALCIUM BLD-MCNC: 8.5 MG/DL (ref 8.5–10.1)
CHLORIDE SERPL-SCNC: 111 MMOL/L (ref 98–112)
CO2 SERPL-SCNC: 25 MMOL/L (ref 21–32)
CREAT BLD-MCNC: 0.67 MG/DL
ERYTHROCYTE [DISTWIDTH] IN BLOOD BY AUTOMATED COUNT: 13.5 %
GLUCOSE BLD-MCNC: 106 MG/DL (ref 70–99)
HCT VFR BLD AUTO: 27.6 %
HGB BLD-MCNC: 8.7 G/DL
MCHC RBC AUTO-ENTMCNC: 31.5 G/DL (ref 31–37)
MCV RBC AUTO: 87.9 FL
OSMOLALITY SERPL CALC.SUM OF ELEC: 292 MOSM/KG (ref 275–295)
PLATELET # BLD AUTO: 245 10(3)UL (ref 150–450)
POTASSIUM SERPL-SCNC: 3.7 MMOL/L (ref 3.5–5.1)
RBC # BLD AUTO: 3.14 X10(6)UL
SODIUM SERPL-SCNC: 142 MMOL/L (ref 136–145)
WBC # BLD AUTO: 5.4 X10(3) UL (ref 4–11)

## 2022-02-25 PROCEDURE — 80048 BASIC METABOLIC PNL TOTAL CA: CPT | Performed by: INTERNAL MEDICINE

## 2022-02-25 PROCEDURE — 85027 COMPLETE CBC AUTOMATED: CPT | Performed by: INTERNAL MEDICINE

## 2022-02-25 NOTE — PROGRESS NOTES
Endo:    IVUS of left iliac vein  Stenting of left iliac vein  IVC filter (cook) in IVC (retrievable)  Angioplasty (post dilitation) of iliac vein stent    Well tolerated. Perclose to both left and right veins. Load with noac. EKOS off/out. ICU overnight. Dictation to follow. Sabina Barragan.

## 2022-02-25 NOTE — PLAN OF CARE
Assumed care of patient at 1. A/Ox4. VSS. L and R groin sites with 4x4 gauze and tegaderm dressing. Clean, dry, intact. Soft with no hematomas. Pain managed with Norco and Morphine. Discussed plan of care with patient. All questions answered. Problem: PAIN - ADULT  Goal: Verbalizes/displays adequate comfort level or patient's stated pain goal  Description: INTERVENTIONS:  - Encourage pt to monitor pain and request assistance  - Assess pain using appropriate pain scale  - Administer analgesics based on type and severity of pain and evaluate response  - Implement non-pharmacological measures as appropriate and evaluate response  - Consider cultural and social influences on pain and pain management  - Manage/alleviate anxiety  - Utilize distraction and/or relaxation techniques  - Monitor for opioid side effects  - Notify MD/LIP if interventions unsuccessful or patient reports new pain  - Anticipate increased pain with activity and pre-medicate as appropriate  Outcome: Progressing     Problem: RISK FOR INFECTION - ADULT  Goal: Absence of fever/infection during anticipated neutropenic period  Description: INTERVENTIONS  - Monitor WBC  - Administer growth factors as ordered  - Implement neutropenic guidelines  Outcome: Progressing     Problem: SAFETY ADULT - FALL  Goal: Free from fall injury  Description: INTERVENTIONS:  - Assess pt frequently for physical needs  - Identify cognitive and physical deficits and behaviors that affect risk of falls.   - [place] fall precautions as indicated by assessment.  - Educate pt/family on patient safety including physical limitations  - Instruct pt to call for assistance with activity based on assessment  - Modify environment to reduce risk of injury  - Provide assistive devices as appropriate  - Consider OT/PT consult to assist with strengthening/mobility  - Encourage toileting schedule  Outcome: Progressing

## 2022-02-26 PROCEDURE — 97535 SELF CARE MNGMENT TRAINING: CPT

## 2022-02-26 PROCEDURE — 97116 GAIT TRAINING THERAPY: CPT

## 2022-02-26 PROCEDURE — 97165 OT EVAL LOW COMPLEX 30 MIN: CPT

## 2022-02-26 PROCEDURE — 97161 PT EVAL LOW COMPLEX 20 MIN: CPT

## 2022-02-26 RX ORDER — POLYETHYLENE GLYCOL 3350 17 G/17G
17 POWDER, FOR SOLUTION ORAL DAILY
Status: DISCONTINUED | OUTPATIENT
Start: 2022-02-26 | End: 2022-02-27

## 2022-02-26 RX ORDER — DOCUSATE SODIUM 100 MG/1
100 CAPSULE, LIQUID FILLED ORAL 2 TIMES DAILY
Status: DISCONTINUED | OUTPATIENT
Start: 2022-02-26 | End: 2022-02-27

## 2022-02-26 RX ORDER — DIPHENHYDRAMINE HCL 25 MG
25 CAPSULE ORAL EVERY 6 HOURS PRN
Status: DISCONTINUED | OUTPATIENT
Start: 2022-02-26 | End: 2022-02-27

## 2022-02-26 RX ORDER — SENNOSIDES 8.6 MG
8.6 TABLET ORAL 2 TIMES DAILY
Status: DISCONTINUED | OUTPATIENT
Start: 2022-02-26 | End: 2022-02-27

## 2022-02-26 NOTE — PLAN OF CARE
Pt progressing well. Able to walk in hallway w/walker and in room. Still painful to bear weight on left leg. Norco adequate for pain control. Hgb stable. VSS. Rec'd assignment for CTU bed 5118. Pt & family updated on POC. Telephone report given. Awaiting transport.

## 2022-02-26 NOTE — PROCEDURES
[facility]    PATIENT'S NAME: Leobardo Santana   ATTENDING PHYSICIAN: Lavell Pennington MD   OPERATING PHYSICIAN: Jefry Roman. Dorothy Montalvo MD   PATIENT ACCOUNT#:   [id number]    LOCATION:  [address] [place] [address]  MEDICAL RECORD #:   [id number]       YOB: 1955  ADMISSION DATE:       02/22/2022      OPERATION DATE:  02/24/2022    CARDIAC PROCEDURE TRANSCRIPTION    VENOGRAPHY/PERCUTANEOUS PERIPHERAL INTERVENTION    PREOPERATIVE DIAGNOSIS:  Deep vein thrombosis. POSTOPERATIVE DIAGNOSIS:  1.   Status post inferior vena cava filter placement. 2.   Status post left iliac venous stenting. 3.   Left common iliac venous stenosis. PROCEDURE PERFORMED:  1. Venography of both left and right from the common femoral veins, imaging the iliac venous system bilaterally. 2.   IVC filter placement. 3.   Venography of IVC. 4.   Stent placement in left common iliac vein. 5.   IVUS of left iliac vein. 6.   Balloon angioplasty of left iliac vein. SEDATION:  We gave 6 mg of Versed and 150 mcg of fentanyl from 2:15 p.m. until 3:05 p.m. under my direct supervision with independent nursing monitoring performed by the catheterization lab staff. PROCEDURAL DETAILS:  Informed consent was obtained. Please see catheterization report from yesterday describing our thrombolytic treatment. The thrombolytic drips were turned off. We sterilized her sheaths as much as possible. We removed the EKOS catheters. We swapped out the left 6-Gibraltarian sheath for a sterile fresh 6-Gibraltarian sheath. We swapped out the 7-Gibraltarian sheath on the right for a 7-Gibraltarian sheath. We gave 2 g of Ancef for antibiotic therapy. We did an angio through the left venous sheath and the thrombus burden was nearly completely resolved, though there was still the residual stenosis/fibrosis at the left common iliac vein.   We put a wire up through there and performed intravascular ultrasound, which showed that the vein was quite large, but was heavily stenotic and fibrotic in the common femoral vein with the artery running right next to it. It was not clearly compressed, but there were suggestions that compression may have been related to the etiology of the fibrosis and disease. Given that the patient had thrombosed while on anticoagulation and there was still residual thrombus in this very large iliac vein, we went up from the right venous system to the IVC and used a Cook [employer]. We did a venogram in the IVC to identify the renal veins and then we used a Cook [place] IVC filter (retrievable filter) and positioned it and deployed it in the standard fashion. We did a completion venogram.  We had an excellent result and the position was right below the renal veins and certainly above the iliac venous bifurcation. We then turned our attention to stenting of the left iliac vein. We pre-closed the access point on the left (left common femoral vein) and we used a stiff Amplatz wire. We used a 20 mm x 80 mm Medtronic venous stent. We deployed it in standard fashion. We positioned it with angio from both access points (left and right common femoral veins). We then did a venogram after the  stent was deployed and we had excellent placement. It was not completely expanded. We, therefore, did postdilatation angioplasty with an 18 mm balloon, making several inflations up to 10 atmospheres. We had an excellent result with excellent stent placement and widely patent left and right iliac veins. We had good flow and we, therefore, ended the case. We used a Perclose device for the right common femoral vein and we used a previously placed Perclose suture for the left common femoral vein (both venous access sites were Perclosed). We had good hemostasis. Of note, we had used heparin for anticoagulation during the case after the tPA was turned off. COMPLICATIONS:  None. CONCLUSIONS:  A 78year-old with massive left DVT from left iliac venous disease.   Initially, last week her entire left venous system was full of thrombus. This was treated with aspiration thrombectomy, but she rethrombosed, likely due to residual stenosis in the left iliac (common) vein despite full anticoagulation. We then treated her with thrombolysis, and then after IVUS we placed an IVC filter to perform safe iliac venous stenting of the left common iliac vein. We had an excellent result. The patient now has excellent venous flow from the popliteal vein all the way up to the IVC. She will be started on oral anticoagulation ASAP. We will transfer her to the intensive care unit for further observation overnight. She will have her filter removed in 4 weeks if she has no problems with subsequent thrombosis. We will need to evaluate her clinically and follow with ultrasound. Of note, this procedure and the prior procedure were done in conjunction with Dr. Rita So, her primary cardiologist.    Dictated By Dixon Sal.  Alverto Lee MD  d: 02/25/2022 10:20:21  t: 02/25/2022 12:22:18  [facility] [id number]  PGT/

## 2022-02-26 NOTE — PLAN OF CARE
Assumed care for this pt at [address] [place] [address]. Pt alert oriented, vss, up with stand by assist and walker. Pt pain managed by prn medications. Dressing to bilateral groins dry intact with old dry drainage to right dressing. Pt denies any numbness or tingling. Bilateral pedal pulses doppler. Will continue to monitor  Groin dressing removed, are soft no hematoma present. Problem: PAIN - ADULT  Goal: Verbalizes/displays adequate comfort level or patient's stated pain goal  Description: INTERVENTIONS:  - Encourage pt to monitor pain and request assistance  - Assess pain using appropriate pain scale  - Administer analgesics based on type and severity of pain and evaluate response  - Implement non-pharmacological measures as appropriate and evaluate response  - Consider cultural and social influences on pain and pain management  - Manage/alleviate anxiety  - Utilize distraction and/or relaxation techniques  - Monitor for opioid side effects  - Notify MD/LIP if interventions unsuccessful or patient reports new pain  - Anticipate increased pain with activity and pre-medicate as appropriate  Outcome: Progressing     Problem: RISK FOR INFECTION - ADULT  Goal: Absence of fever/infection during anticipated neutropenic period  Description: INTERVENTIONS  - Monitor WBC  - Administer growth factors as ordered  - Implement neutropenic guidelines  Outcome: Progressing     Problem: SAFETY ADULT - FALL  Goal: Free from fall injury  Description: INTERVENTIONS:  - Assess pt frequently for physical needs  - Identify cognitive and physical deficits and behaviors that affect risk of falls.   - [place] fall precautions as indicated by assessment.  - Educate pt/family on patient safety including physical limitations  - Instruct pt to call for assistance with activity based on assessment  - Modify environment to reduce risk of injury  - Provide assistive devices as appropriate  - Consider OT/PT consult to assist with strengthening/mobility  - Encourage toileting schedule  Outcome: Progressing     Problem: DISCHARGE PLANNING  Goal: Discharge to home or other facility with appropriate resources  Description: INTERVENTIONS:  - Identify barriers to discharge w/pt and caregiver  - Include patient/family/discharge partner in discharge planning  - Arrange for needed discharge resources and transportation as appropriate  - Identify discharge learning needs (meds, wound care, etc)  - Arrange for interpreters to assist at discharge as needed  - Consider post-discharge preferences of patient/family/discharge partner  - Complete POLST form as appropriate  - Assess patient's ability to be responsible for managing their own health  - Refer to Case Management Department for coordinating discharge planning if the patient needs post-hospital services based on physician/LIP order or complex needs related to functional status, cognitive ability or social support system  Outcome: Progressing     Problem: HEMATOLOGIC - ADULT  Goal: Free from bleeding injury  Description: (Example usage: patient with low platelets)  INTERVENTIONS:  - Avoid intramuscular injections, enemas and rectal medication administration  - Ensure safe mobilization of patient  - Hold pressure on venipuncture sites to achieve adequate hemostasis  - Assess for signs and symptoms of internal bleeding  - Monitor lab trends  - Patient is to report abnormal signs of bleeding to staff  - Avoid use of toothpicks and dental floss  - Use electric shaver for shaving  - Use soft bristle tooth brush  - Limit straining and forceful nose blowing  Outcome: Progressing

## 2022-02-26 NOTE — PLAN OF CARE
Assumed care this morning. Pt oob to chair with one person assist and ambulated in halls with walker. Pt  states left leg swelling is \"much better\". Pulses per doppler. NSR on monitor, BP stable. RA with sats >95%. Denies SOB. Pain well controlled with norco.  May transfer to tele, waiting for new bed. Problem: PAIN - ADULT  Goal: Verbalizes/displays adequate comfort level or patient's stated pain goal  Description: INTERVENTIONS:  - Encourage pt to monitor pain and request assistance  - Assess pain using appropriate pain scale  - Administer analgesics based on type and severity of pain and evaluate response  - Implement non-pharmacological measures as appropriate and evaluate response  - Consider cultural and social influences on pain and pain management  - Manage/alleviate anxiety  - Utilize distraction and/or relaxation techniques  - Monitor for opioid side effects  - Notify MD/LIP if interventions unsuccessful or patient reports new pain  - Anticipate increased pain with activity and pre-medicate as appropriate  Outcome: Progressing     Problem: RISK FOR INFECTION - ADULT  Goal: Absence of fever/infection during anticipated neutropenic period  Description: INTERVENTIONS  - Monitor WBC  - Administer growth factors as ordered  - Implement neutropenic guidelines  Outcome: Progressing     Problem: SAFETY ADULT - FALL  Goal: Free from fall injury  Description: INTERVENTIONS:  - Assess pt frequently for physical needs  - Identify cognitive and physical deficits and behaviors that affect risk of falls.   - [place] fall precautions as indicated by assessment.  - Educate pt/family on patient safety including physical limitations  - Instruct pt to call for assistance with activity based on assessment  - Modify environment to reduce risk of injury  - Provide assistive devices as appropriate  - Consider OT/PT consult to assist with strengthening/mobility  - Encourage toileting schedule  Outcome: Progressing Problem: DISCHARGE PLANNING  Goal: Discharge to home or other facility with appropriate resources  Description: INTERVENTIONS:  - Identify barriers to discharge w/pt and caregiver  - Include patient/family/discharge partner in discharge planning  - Arrange for needed discharge resources and transportation as appropriate  - Identify discharge learning needs (meds, wound care, etc)  - Arrange for interpreters to assist at discharge as needed  - Consider post-discharge preferences of patient/family/discharge partner  - Complete POLST form as appropriate  - Assess patient's ability to be responsible for managing their own health  - Refer to Case Management Department for coordinating discharge planning if the patient needs post-hospital services based on physician/LIP order or complex needs related to functional status, cognitive ability or social support system  Outcome: Progressing     Problem: HEMATOLOGIC - ADULT  Goal: Free from bleeding injury  Description: (Example usage: patient with low platelets)  INTERVENTIONS:  - Avoid intramuscular injections, enemas and rectal medication administration  - Ensure safe mobilization of patient  - Hold pressure on venipuncture sites to achieve adequate hemostasis  - Assess for signs and symptoms of internal bleeding  - Monitor lab trends  - Patient is to report abnormal signs of bleeding to staff  - Avoid use of toothpicks and dental floss  - Use electric shaver for shaving  - Use soft bristle tooth brush  - Limit straining and forceful nose blowing  Outcome: Progressing

## 2022-02-26 NOTE — PROCEDURES
[facility]    PATIENT'S NAME: Inge Porter   ATTENDING PHYSICIAN: Maurisio Mcmahan MD   OPERATING PHYSICIAN: Areli Cummings. Ying Ascencio MD   PATIENT ACCOUNT#:   [id number]    LOCATION:  [address] [place] [address]  MEDICAL RECORD #:   [id number]       YOB: 1955  ADMISSION DATE:       02/22/2022      OPERATION DATE:  02/23/2022    CARDIAC PROCEDURE TRANSCRIPTION    THROMBOLYTIC CATHETER PLACEMENT    PREOPERATIVE DIAGNOSIS:  Deep vein thrombosis. POSTOPERATIVE DIAGNOSIS:  Deep vein thrombosis, status post catheter-directed thrombolysis. PROCEDURE PERFORMED:  1. Right and left common femoral venous access. 2.   Selective catheter placement in left popliteal vein. 3.   Placement of EKOS ultrasonic thrombolytic catheter. 4.   Venography. 5.   Intra-arterial thrombolysis. SEDATION:  We gave 6 mg of Versed and 150 mcg of fentanyl from 2 p.m. until 2:45 p.m. under my direct supervision with independent nursing monitoring performed by the catheterization lab staff. PROCEDURAL DETAILS:  Informed consent was obtained. We gained access in the right common femoral vein in the standard fashion. We had ultrasound for venous puncture. We ultrasounded the left common femoral vein that was full of thrombus. We tried to go up and over using a RIM catheter and a Glidewire from the right into the left venous system; however, we could not get through the flush occlusion of the left iliac vein. We, therefore, eventually gained left common femoral venous access using ultrasound. We stuck right next to a visible thrombus and used a micropuncture kit and upgraded to a 6-Nepalese Slender sheath. We then used our Glidewire through that sheath and were clearly in the true lumen in the IVC.   We then ballooned with a 9 mm balloon, and you could see that the left iliac system was essentially occluded with some mild patency after ballooning, and we were able to wire the left iliac system from the right with a RIM catheter and a Glidewire. We ran the wire and a Quick-Cross catheter all the way down to the popliteal vein on the left and confirmed our position. The saphenous system on the left was patent with some thrombus, but the left iliac system was essentially occluded, even after our angioplasty. We, therefore, placed a 300 cm treatment zone EKOS catheter from the left common iliac as far down as it would go which was in the saphenous vein. We gave a 10 mg tPA bolus and started thrombolysis and ultrasound treatment in the standard fashion at 1 mg of tPA per hour. We gave heparin through the side arm of the sheath. Of note, we upgraded the right sheath which was our main access point to a 7-Spanish Terumo Destination sheath, through which we had the EKOS catheter. We had flow in the left iliac system, but it was just a trickle, given the heavy thrombus burden, and our plan is to drip in thrombolytics overnight. Patient tolerated the procedure well. We left both sheaths (7-Spanish Terumo Destination in the right common femoral vein and 6-Spanish Slender sheath in the left common femoral vein) in place and may be utilized tomorrow when we re-evaluate the situation after thrombolysis and consider stenting the left iliac venous system. COMPLICATIONS:  None. ANGIOGRAPHIC FINDINGS:  There is essentially an occluded left iliac venous system extending from the common femoral vein up to the IVC. The right common femoral vein from the femoral to the IVC is widely patent. The left saphenous system is patent with some visible thrombus, and below the knee, we have ultrasound evidence that it is widely patent. CONCLUSIONS:  A 78year-old with extensive DVT. We placed a thrombolytic catheter to help decrease our iliac thrombus burden and will consider tomorrow stenting the left iliac vein. The patient tolerated the procedure well. We gave a bolus of tPA, and we will do an overnight thrombolytic drip per protocol.   We turned off systemic heparin, and she will get heparin through the sheath and 1 mg of tPA per hour. Eventually, she will need oral anticoagulation, but we will hold off on that until after we re-evaluate the situation tomorrow. Dictated By Kailee Silver MD  d: 02/25/2022 10:11:37  t: 02/25/2022 10:57:14  Job [id number]  JJC/

## 2022-02-27 VITALS
SYSTOLIC BLOOD PRESSURE: 125 MMHG | OXYGEN SATURATION: 98 % | HEART RATE: 75 BPM | TEMPERATURE: 98 F | RESPIRATION RATE: 18 BRPM | DIASTOLIC BLOOD PRESSURE: 60 MMHG

## 2022-02-27 RX ORDER — ASPIRIN 81 MG/1
81 TABLET ORAL DAILY
Qty: 30 TABLET | Refills: 0 | Status: SHIPPED | OUTPATIENT
Start: 2022-02-28

## 2022-02-27 RX ORDER — HYDROCODONE BITARTRATE AND ACETAMINOPHEN 10; 325 MG/1; MG/1
2 TABLET ORAL EVERY 4 HOURS PRN
Qty: 28 TABLET | Refills: 0 | Status: SHIPPED | OUTPATIENT
Start: 2022-02-27 | End: 2022-03-06

## 2022-02-27 RX ORDER — POLYETHYLENE GLYCOL 3350 17 G/17G
17 POWDER, FOR SOLUTION ORAL DAILY
Qty: 60 PACKET | Refills: 0 | Status: SHIPPED | OUTPATIENT
Start: 2022-02-28

## 2022-02-27 NOTE — PLAN OF CARE
Problem: PAIN - ADULT  Goal: Verbalizes/displays adequate comfort level or patient's stated pain goal  Description: INTERVENTIONS:  - Encourage pt to monitor pain and request assistance  - Assess pain using appropriate pain scale  - Administer analgesics based on type and severity of pain and evaluate response  - Implement non-pharmacological measures as appropriate and evaluate response  - Consider cultural and social influences on pain and pain management  - Manage/alleviate anxiety  - Utilize distraction and/or relaxation techniques  - Monitor for opioid side effects  - Notify MD/LIP if interventions unsuccessful or patient reports new pain  - Anticipate increased pain with activity and pre-medicate as appropriate  Outcome: Adequate for Discharge   Pt discharged with script for Norco that is helping with pain control. Initial (On Arrival)

## 2022-02-27 NOTE — PLAN OF CARE
Assumed pt care at 1 for the night shift. Pt resting in bed . Reports lt leg pain and itching. Norco  and Benadryl given prn w/ adequate relief. Lt leg swollen. Rt groin tender w/ bruising. No hematoma noted to either groin. Pedal pulses palpable bilaterally. VSS. Afebrile. O2 sats >90%. on RA. Remains in bed overnight. Plan for hem onc to see in am.   Pt updated on poc. All questions and concerns addressed. Will cont to monitor. Problem: PAIN - ADULT  Goal: Verbalizes/displays adequate comfort level or patient's stated pain goal  Description: INTERVENTIONS:  - Encourage pt to monitor pain and request assistance  - Assess pain using appropriate pain scale  - Administer analgesics based on type and severity of pain and evaluate response  - Implement non-pharmacological measures as appropriate and evaluate response  - Consider cultural and social influences on pain and pain management  - Manage/alleviate anxiety  - Utilize distraction and/or relaxation techniques  - Monitor for opioid side effects  - Notify MD/LIP if interventions unsuccessful or patient reports new pain  - Anticipate increased pain with activity and pre-medicate as appropriate  Outcome: Progressing     Problem: SAFETY ADULT - FALL  Goal: Free from fall injury  Description: INTERVENTIONS:  - Assess pt frequently for physical needs  - Identify cognitive and physical deficits and behaviors that affect risk of falls.   - [place] fall precautions as indicated by assessment.  - Educate pt/family on patient safety including physical limitations  - Instruct pt to call for assistance with activity based on assessment  - Modify environment to reduce risk of injury  - Provide assistive devices as appropriate  - Consider OT/PT consult to assist with strengthening/mobility  - Encourage toileting schedule  Outcome: Progressing     Problem: DISCHARGE PLANNING  Goal: Discharge to home or other facility with appropriate resources  Description: INTERVENTIONS:  - Identify barriers to discharge w/pt and caregiver  - Include patient/family/discharge partner in discharge planning  - Arrange for needed discharge resources and transportation as appropriate  - Identify discharge learning needs (meds, wound care, etc)  - Arrange for interpreters to assist at discharge as needed  - Consider post-discharge preferences of patient/family/discharge partner  - Complete POLST form as appropriate  - Assess patient's ability to be responsible for managing their own health  - Refer to Case Management Department for coordinating discharge planning if the patient needs post-hospital services based on physician/LIP order or complex needs related to functional status, cognitive ability or social support system  Outcome: Progressing     Problem: HEMATOLOGIC - ADULT  Goal: Free from bleeding injury  Description: (Example usage: patient with low platelets)  INTERVENTIONS:  - Avoid intramuscular injections, enemas and rectal medication administration  - Ensure safe mobilization of patient  - Hold pressure on venipuncture sites to achieve adequate hemostasis  - Assess for signs and symptoms of internal bleeding  - Monitor lab trends  - Patient is to report abnormal signs of bleeding to staff  - Avoid use of toothpicks and dental floss  - Use electric shaver for shaving  - Use soft bristle tooth brush  - Limit straining and forceful nose blowing  Outcome: Progressing

## 2022-02-27 NOTE — CM/SW NOTE
Xarelto coupon cards (free 30 day and [employer]) provided to pt. Spoke with spouse regarding the coupons. Informed that the program begins April 1 and that you have to apply for the program. SW also discussed Leona Stark. Await agencies to respond. Informed spouse that a SW or CM will follow up Monday with some home health agencies.      GAVIN Rodriguez, [facility]   / Discharge Planner  [id number]

## 2022-02-27 NOTE — PLAN OF CARE
Pt discharged per wc per transport. No c/o and in no apparent distress. avs reviewed in detail. Pt given cards for Xarelto. Knows about f/u appt's.  to drive home.

## 2022-02-27 NOTE — CM/SW NOTE
Chart reviewed for discharge needs. PT is recommending [facility]. SW initiated [facility] referrals in [place]. Order/F2F entered.  Await acceptance to provide choice list.     GAVIN Camarena, [facility]   / Discharge Planner  [id number]

## 2022-03-10 PROBLEM — R07.89 OTHER CHEST PAIN: Status: RESOLVED | Noted: 2022-01-17 | Resolved: 2022-03-10

## 2022-03-15 PROBLEM — I82.402 DEEP VEIN THROMBOSIS (DVT) OF LEFT LOWER EXTREMITY, UNSPECIFIED CHRONICITY, UNSPECIFIED VEIN (HCC): Status: ACTIVE | Noted: 2022-02-14

## 2022-03-15 PROBLEM — Z09 HOSPITAL DISCHARGE FOLLOW-UP: Status: ACTIVE | Noted: 2022-03-15

## 2022-03-15 PROBLEM — F17.200 TOBACCO USE DISORDER: Status: ACTIVE | Noted: 2022-03-15

## 2022-03-15 PROBLEM — Z79.01 CURRENT USE OF LONG TERM ANTICOAGULATION: Status: ACTIVE | Noted: 2022-03-15

## 2022-06-30 RX ORDER — DIPHENHYDRAMINE HCL 25 MG
25 CAPSULE ORAL EVERY 6 HOURS PRN
COMMUNITY

## 2022-07-01 ENCOUNTER — HOSPITAL ENCOUNTER (OUTPATIENT)
Dept: INTERVENTIONAL RADIOLOGY/VASCULAR | Facility: HOSPITAL | Age: 67
Discharge: HOME | End: 2022-07-01
Attending: INTERNAL MEDICINE | Admitting: INTERNAL MEDICINE
Payer: MEDICARE

## 2022-07-01 VITALS
BODY MASS INDEX: 30.37 KG/M2 | HEIGHT: 66 IN | OXYGEN SATURATION: 95 % | SYSTOLIC BLOOD PRESSURE: 119 MMHG | HEART RATE: 71 BPM | RESPIRATION RATE: 18 BRPM | TEMPERATURE: 97 F | WEIGHT: 189 LBS | DIASTOLIC BLOOD PRESSURE: 88 MMHG

## 2022-07-01 DIAGNOSIS — R93.89 ABNORMAL COMPUTED TOMOGRAPHY ANGIOGRAPHY (CTA): ICD-10-CM

## 2022-07-01 DIAGNOSIS — Z95.828 PRESENCE OF IVC FILTER: ICD-10-CM

## 2022-07-01 DIAGNOSIS — I82.409 DEEP VEIN THROMBOSIS (DVT) (HCC): ICD-10-CM

## 2022-07-01 PROCEDURE — B2151ZZ FLUOROSCOPY OF LEFT HEART USING LOW OSMOLAR CONTRAST: ICD-10-PCS | Performed by: INTERNAL MEDICINE

## 2022-07-01 PROCEDURE — 93458 L HRT ARTERY/VENTRICLE ANGIO: CPT | Performed by: INTERNAL MEDICINE

## 2022-07-01 PROCEDURE — 99152 MOD SED SAME PHYS/QHP 5/>YRS: CPT | Performed by: INTERNAL MEDICINE

## 2022-07-01 PROCEDURE — 99153 MOD SED SAME PHYS/QHP EA: CPT | Performed by: INTERNAL MEDICINE

## 2022-07-01 PROCEDURE — B2111ZZ FLUOROSCOPY OF MULTIPLE CORONARY ARTERIES USING LOW OSMOLAR CONTRAST: ICD-10-PCS | Performed by: INTERNAL MEDICINE

## 2022-07-01 PROCEDURE — 06PY3DZ REMOVAL OF INTRALUMINAL DEVICE FROM LOWER VEIN, PERCUTANEOUS APPROACH: ICD-10-PCS | Performed by: INTERNAL MEDICINE

## 2022-07-01 PROCEDURE — 37193 REM ENDOVAS VENA CAVA FILTER: CPT | Performed by: INTERNAL MEDICINE

## 2022-07-01 PROCEDURE — 4A023N7 MEASUREMENT OF CARDIAC SAMPLING AND PRESSURE, LEFT HEART, PERCUTANEOUS APPROACH: ICD-10-PCS | Performed by: INTERNAL MEDICINE

## 2022-07-01 RX ORDER — SODIUM CHLORIDE 9 MG/ML
INJECTION, SOLUTION INTRAVENOUS CONTINUOUS
OUTPATIENT
Start: 2022-07-01 | End: 2022-07-01

## 2022-07-01 RX ORDER — MIDAZOLAM HYDROCHLORIDE 1 MG/ML
INJECTION INTRAMUSCULAR; INTRAVENOUS
Status: COMPLETED
Start: 2022-07-01 | End: 2022-07-01

## 2022-07-01 RX ORDER — VERAPAMIL HYDROCHLORIDE 2.5 MG/ML
INJECTION, SOLUTION INTRAVENOUS
Status: COMPLETED
Start: 2022-07-01 | End: 2022-07-01

## 2022-07-01 RX ORDER — SODIUM CHLORIDE 9 MG/ML
INJECTION, SOLUTION INTRAVENOUS
Status: DISCONTINUED | OUTPATIENT
Start: 2022-07-02 | End: 2022-07-01 | Stop reason: HOSPADM

## 2022-07-01 RX ORDER — LIDOCAINE HYDROCHLORIDE 10 MG/ML
INJECTION, SOLUTION EPIDURAL; INFILTRATION; INTRACAUDAL; PERINEURAL
Status: COMPLETED
Start: 2022-07-01 | End: 2022-07-01

## 2022-07-01 RX ORDER — HEPARIN SODIUM 5000 [USP'U]/ML
INJECTION, SOLUTION INTRAVENOUS; SUBCUTANEOUS
Status: COMPLETED
Start: 2022-07-01 | End: 2022-07-01

## 2022-07-01 RX ORDER — NITROGLYCERIN 20 MG/100ML
INJECTION INTRAVENOUS
Status: COMPLETED
Start: 2022-07-01 | End: 2022-07-01

## 2022-07-01 NOTE — PROGRESS NOTES
Patient discharged home post cardiac cath and IVC filter removal. Pt is able to sit up and ambulate without difficulty. Pt's vital signs are stable. Right radial and right neck procedural access site is dry and intact with no signs and symptoms of bleeding or hematoma. Pt tolerated food/fluids. Dr. Shelby Lopez and Mehdi rollins CVOR spoke to pt/family post procedure. Instructions provided and pt/family verbalized understanding. PIV removed. Patient discharged in wheelchair with all belongings.  driving home.

## 2022-07-13 ENCOUNTER — HOSPITAL ENCOUNTER (OUTPATIENT)
Dept: INTERVENTIONAL RADIOLOGY/VASCULAR | Facility: HOSPITAL | Age: 67
Discharge: HOME | End: 2022-07-13
Attending: THORACIC SURGERY (CARDIOTHORACIC VASCULAR SURGERY)
Payer: MEDICARE

## 2022-07-13 ENCOUNTER — HOSPITAL ENCOUNTER (OUTPATIENT)
Dept: LAB | Facility: HOSPITAL | Age: 67
Discharge: HOME | End: 2022-07-13
Attending: THORACIC SURGERY (CARDIOTHORACIC VASCULAR SURGERY)
Payer: MEDICARE

## 2022-07-13 ENCOUNTER — HOSPITAL ENCOUNTER (OUTPATIENT)
Dept: CV DIAGNOSTICS | Facility: HOSPITAL | Age: 67
Discharge: HOME | End: 2022-07-13
Attending: INTERNAL MEDICINE
Payer: MEDICARE

## 2022-07-13 ENCOUNTER — HOSPITAL ENCOUNTER (OUTPATIENT)
Dept: ULTRASOUND IMAGING | Facility: HOSPITAL | Age: 67
Discharge: HOME | End: 2022-07-13
Attending: THORACIC SURGERY (CARDIOTHORACIC VASCULAR SURGERY)
Payer: MEDICARE

## 2022-07-13 ENCOUNTER — HOSPITAL ENCOUNTER (OUTPATIENT)
Dept: CV DIAGNOSTICS | Facility: HOSPITAL | Age: 67
Discharge: HOME | End: 2022-07-13
Attending: THORACIC SURGERY (CARDIOTHORACIC VASCULAR SURGERY)
Payer: MEDICARE

## 2022-07-13 ENCOUNTER — HOSPITAL ENCOUNTER (OUTPATIENT)
Dept: GENERAL RADIOLOGY | Facility: HOSPITAL | Age: 67
Discharge: HOME | End: 2022-07-13
Attending: THORACIC SURGERY (CARDIOTHORACIC VASCULAR SURGERY)
Payer: MEDICARE

## 2022-07-13 ENCOUNTER — HOSPITAL ENCOUNTER (OUTPATIENT)
Dept: CARDIOLOGY CLINIC | Facility: HOSPITAL | Age: 67
Discharge: HOME | End: 2022-07-13
Attending: INTERNAL MEDICINE
Payer: MEDICARE

## 2022-07-13 DIAGNOSIS — Z01.818 PRE-OP TESTING: ICD-10-CM

## 2022-07-13 DIAGNOSIS — Z91.89 AT RISK FOR BLEEDING: ICD-10-CM

## 2022-07-13 DIAGNOSIS — I25.10 CAD (CORONARY ARTERY DISEASE), NATIVE CORONARY ARTERY: ICD-10-CM

## 2022-07-13 DIAGNOSIS — Z01.818 PRE-OP EVALUATION: ICD-10-CM

## 2022-07-13 DIAGNOSIS — I25.10 CAD (CORONARY ARTERY DISEASE): ICD-10-CM

## 2022-07-13 LAB
ALBUMIN SERPL-MCNC: 3.6 G/DL (ref 3.4–5)
ALBUMIN/GLOB SERPL: 1 {RATIO} (ref 1–2)
ALP LIVER SERPL-CCNC: 115 U/L
ALT SERPL-CCNC: 30 U/L
ANION GAP SERPL CALC-SCNC: 6 MMOL/L (ref 0–18)
ANTIBODY SCREEN: NEGATIVE
APTT PPP: 32.5 SECONDS (ref 23.3–35.6)
AST SERPL-CCNC: 34 U/L (ref 15–37)
ATRIAL RATE: 74 BPM
BASOPHILS # BLD AUTO: 0.07 X10(3) UL (ref 0–0.2)
BASOPHILS NFR BLD AUTO: 1.1 %
BILIRUB SERPL-MCNC: 0.4 MG/DL (ref 0.1–2)
BILIRUB UR QL STRIP.AUTO: NEGATIVE
BUN BLD-MCNC: 9 MG/DL (ref 7–18)
CALCIUM BLD-MCNC: 9.1 MG/DL (ref 8.5–10.1)
CHLORIDE SERPL-SCNC: 110 MMOL/L (ref 98–112)
CO2 SERPL-SCNC: 24 MMOL/L (ref 21–32)
COLOR UR AUTO: YELLOW
CREAT BLD-MCNC: 0.86 MG/DL
EOSINOPHIL # BLD AUTO: 0.22 X10(3) UL (ref 0–0.7)
EOSINOPHIL NFR BLD AUTO: 3.4 %
ERYTHROCYTE [DISTWIDTH] IN BLOOD BY AUTOMATED COUNT: 13.8 %
EST. AVERAGE GLUCOSE BLD GHB EST-MCNC: 128 MG/DL (ref 68–126)
FASTING STATUS PATIENT QL REPORTED: NO
GLOBULIN PLAS-MCNC: 3.6 G/DL (ref 2.8–4.4)
GLUCOSE BLD-MCNC: 94 MG/DL (ref 70–99)
GLUCOSE UR STRIP.AUTO-MCNC: NEGATIVE MG/DL
HBA1C MFR BLD: 6.1 % (ref ?–5.7)
HCT VFR BLD AUTO: 40.1 %
HGB BLD-MCNC: 12.7 G/DL
IMM GRANULOCYTES # BLD AUTO: 0.01 X10(3) UL (ref 0–1)
IMM GRANULOCYTES NFR BLD: 0.2 %
INR BLD: 1.2 (ref 0.8–1.2)
LYMPHOCYTES # BLD AUTO: 1.86 X10(3) UL (ref 1–4)
LYMPHOCYTES NFR BLD AUTO: 29.1 %
MCH RBC QN AUTO: 27.3 PG (ref 26–34)
MCHC RBC AUTO-ENTMCNC: 31.7 G/DL (ref 31–37)
MCV RBC AUTO: 86.2 FL
MONOCYTES # BLD AUTO: 0.49 X10(3) UL (ref 0.1–1)
MONOCYTES NFR BLD AUTO: 7.7 %
NEUTROPHILS # BLD AUTO: 3.74 X10 (3) UL (ref 1.5–7.7)
NEUTROPHILS # BLD AUTO: 3.74 X10(3) UL (ref 1.5–7.7)
NEUTROPHILS NFR BLD AUTO: 58.5 %
NITRITE UR QL STRIP.AUTO: NEGATIVE
OSMOLALITY SERPL CALC.SUM OF ELEC: 288 MOSM/KG (ref 275–295)
P AXIS: 38 DEGREES
P-R INTERVAL: 152 MS
PH UR STRIP.AUTO: 5 [PH] (ref 5–8)
PLATELET # BLD AUTO: 280 10(3)UL (ref 150–450)
POTASSIUM SERPL-SCNC: 3.8 MMOL/L (ref 3.5–5.1)
PROT SERPL-MCNC: 7.2 G/DL (ref 6.4–8.2)
PROT UR STRIP.AUTO-MCNC: 30 MG/DL
PROTHROMBIN TIME: 15.2 SECONDS (ref 11.6–14.8)
Q-T INTERVAL: 410 MS
QRS DURATION: 78 MS
QTC CALCULATION (BEZET): 455 MS
R AXIS: 3 DEGREES
RBC # BLD AUTO: 4.65 X10(6)UL
RH BLOOD TYPE: NEGATIVE
SODIUM SERPL-SCNC: 140 MMOL/L (ref 136–145)
SP GR UR STRIP.AUTO: 1.03 (ref 1–1.03)
T AXIS: 17 DEGREES
UROBILINOGEN UR STRIP.AUTO-MCNC: <2 MG/DL
VENTRICULAR RATE: 74 BPM
WBC # BLD AUTO: 6.4 X10(3) UL (ref 4–11)

## 2022-07-13 PROCEDURE — 81001 URINALYSIS AUTO W/SCOPE: CPT | Performed by: THORACIC SURGERY (CARDIOTHORACIC VASCULAR SURGERY)

## 2022-07-13 PROCEDURE — 86900 BLOOD TYPING SEROLOGIC ABO: CPT | Performed by: THORACIC SURGERY (CARDIOTHORACIC VASCULAR SURGERY)

## 2022-07-13 PROCEDURE — 93010 ELECTROCARDIOGRAM REPORT: CPT | Performed by: INTERNAL MEDICINE

## 2022-07-13 PROCEDURE — 86901 BLOOD TYPING SEROLOGIC RH(D): CPT | Performed by: THORACIC SURGERY (CARDIOTHORACIC VASCULAR SURGERY)

## 2022-07-13 PROCEDURE — 71045 X-RAY EXAM CHEST 1 VIEW: CPT | Performed by: THORACIC SURGERY (CARDIOTHORACIC VASCULAR SURGERY)

## 2022-07-13 PROCEDURE — 85730 THROMBOPLASTIN TIME PARTIAL: CPT | Performed by: THORACIC SURGERY (CARDIOTHORACIC VASCULAR SURGERY)

## 2022-07-13 PROCEDURE — 85610 PROTHROMBIN TIME: CPT | Performed by: THORACIC SURGERY (CARDIOTHORACIC VASCULAR SURGERY)

## 2022-07-13 PROCEDURE — 93306 TTE W/DOPPLER COMPLETE: CPT | Performed by: THORACIC SURGERY (CARDIOTHORACIC VASCULAR SURGERY)

## 2022-07-13 PROCEDURE — 87086 URINE CULTURE/COLONY COUNT: CPT | Performed by: THORACIC SURGERY (CARDIOTHORACIC VASCULAR SURGERY)

## 2022-07-13 PROCEDURE — 86920 COMPATIBILITY TEST SPIN: CPT

## 2022-07-13 PROCEDURE — 93970 EXTREMITY STUDY: CPT | Performed by: THORACIC SURGERY (CARDIOTHORACIC VASCULAR SURGERY)

## 2022-07-13 PROCEDURE — 93005 ELECTROCARDIOGRAM TRACING: CPT

## 2022-07-13 PROCEDURE — 80053 COMPREHEN METABOLIC PANEL: CPT | Performed by: THORACIC SURGERY (CARDIOTHORACIC VASCULAR SURGERY)

## 2022-07-13 PROCEDURE — 85025 COMPLETE CBC W/AUTO DIFF WBC: CPT | Performed by: THORACIC SURGERY (CARDIOTHORACIC VASCULAR SURGERY)

## 2022-07-13 PROCEDURE — 36415 COLL VENOUS BLD VENIPUNCTURE: CPT | Performed by: THORACIC SURGERY (CARDIOTHORACIC VASCULAR SURGERY)

## 2022-07-13 PROCEDURE — 83036 HEMOGLOBIN GLYCOSYLATED A1C: CPT | Performed by: THORACIC SURGERY (CARDIOTHORACIC VASCULAR SURGERY)

## 2022-07-13 PROCEDURE — 86850 RBC ANTIBODY SCREEN: CPT | Performed by: THORACIC SURGERY (CARDIOTHORACIC VASCULAR SURGERY)

## 2022-07-13 PROCEDURE — 93880 EXTRACRANIAL BILAT STUDY: CPT | Performed by: THORACIC SURGERY (CARDIOTHORACIC VASCULAR SURGERY)

## 2022-07-13 NOTE — CM/SW NOTE
Met with patient and spouse Claude Rizvi to discuss discharge planning as patient is scheduled for CABG surgery on 7-20-22 with Dr. Fariba Corea. Patient resides with her spouse Claude Rizvi in a ranch style home in [place]. The couple has been  for 2 years. 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 Patient is independent with ADL's, has a walker from when 'her IVC filter was removed.'  Wears glasses and drives. Patient shared that she has 'anxiety' and takes medication. Explained to her that she is the center of our care and as such, we are committed to explain whar we are doing for her as well as obtaining her permission--patient verbalized a sense of relief after this discussion. As for hobbies and interests, patient enjoys gardening. pcp is Dr Yoana Shabazz; uses [facility] to have prescriptions filled. Reviewed what to expect the day of / post surgery. Explained how she will work with PT and OT to customize a discharge plan. Discussed the role of hhc @ discharge--patient and spouse in agreement for service. Spouse to be with spouse @ discharge. Emotional support given.   CM/SW to remain available for discharge needs

## 2022-07-13 NOTE — CM/SW NOTE
[facility]--referral to be re referred as they are not in network with patient's insurance    Received notice that [facility] can accept patient @ discharge once admitted, post CABG surgery

## 2022-07-13 NOTE — PAT NURSING NOTE
PAT nursing note: met with patient and spouse Julia Kline in Structural Heart to discuss pre-op instructions for CABG with Dr. Francisco Lakhani on 7/2022; juan antonio bishop; [email or web address] @[id number]; strict npo after 2200-pt v/u; no am coffee, gum, candy, mints, cough drops, etc,; take no am medications; last dose of vitamins/NSAIDs 7/12, Aspirin and Xarelto 7/14-pt v/u; shower with Hibiclens as directed; admit 5-7 days; pack binder, bring insurance card/ID; no jewelry, bring eye class case and denture supplies; all testing to be completed during visit; intra-op procedure information and post-op expectations discussed-pt v/u; roles of PT/OT reviewed; all questions answered.      5 meter walk: 5.43, 6.00, 5.71

## 2022-07-14 ENCOUNTER — TELEPHONE (OUTPATIENT)
Dept: CARDIOLOGY UNIT | Facility: HOSPITAL | Age: 67
End: 2022-07-14

## 2022-07-14 NOTE — PROGRESS NOTES
Reviewed all testing with Dr. Kamlesh Daniel along with heme notes, per Dr. Kamlesh Daniel will bridge with Lovenox, 1/2mg/kg subcutaneous BID on Monday 7/18, in the am Tuesday 7/19. Also per Dr. Kamlesh Daniel will do CTA chest to r/o PE with history of DVTs and pts c/o SOB. LVM for patient to discuss, orders placed.   Luis Antonio Baugh RN  Clinical Coordinator  CV Surgery

## 2022-07-15 ENCOUNTER — TELEPHONE (OUTPATIENT)
Dept: CARDIOLOGY UNIT | Facility: HOSPITAL | Age: 67
End: 2022-07-15

## 2022-07-15 NOTE — PROGRESS NOTES
Attempted to call pt again to discuss below, no answer, left voice mail. Clarification on Lovenox dose from Dr. Whitlock Avers: 30mg BID Monday, and in the am Tuesday. Awaiting call back from patient.   Luis Antonio Baugh RN  Clinical Coordinator  CV Surgery

## 2022-07-16 ENCOUNTER — HOSPITAL ENCOUNTER (OUTPATIENT)
Dept: CT IMAGING | Facility: HOSPITAL | Age: 67
Discharge: HOME | End: 2022-07-16
Attending: THORACIC SURGERY (CARDIOTHORACIC VASCULAR SURGERY)
Payer: MEDICARE

## 2022-07-16 DIAGNOSIS — I25.10 CAD (CORONARY ARTERY DISEASE): ICD-10-CM

## 2022-07-16 DIAGNOSIS — I82.409 DVT (DEEP VENOUS THROMBOSIS) (HCC): ICD-10-CM

## 2022-07-16 DIAGNOSIS — R06.02 SOB (SHORTNESS OF BREATH): ICD-10-CM

## 2022-07-16 PROCEDURE — 71275 CT ANGIOGRAPHY CHEST: CPT | Performed by: THORACIC SURGERY (CARDIOTHORACIC VASCULAR SURGERY)

## 2022-07-18 ENCOUNTER — LAB ENCOUNTER (OUTPATIENT)
Dept: LAB | Age: 67
DRG: 236 | End: 2022-07-18
Attending: THORACIC SURGERY (CARDIOTHORACIC VASCULAR SURGERY)
Payer: MEDICARE

## 2022-07-18 ENCOUNTER — TELEPHONE (OUTPATIENT)
Dept: CARDIOLOGY UNIT | Facility: HOSPITAL | Age: 67
End: 2022-07-18

## 2022-07-18 ENCOUNTER — LAB ENCOUNTER (OUTPATIENT)
Dept: LAB | Age: 67
End: 2022-07-18
Attending: THORACIC SURGERY (CARDIOTHORACIC VASCULAR SURGERY)
Payer: MEDICARE

## 2022-07-18 DIAGNOSIS — Z91.89 AT RISK FOR BLEEDING: ICD-10-CM

## 2022-07-18 DIAGNOSIS — I25.10 CAD (CORONARY ARTERY DISEASE), NATIVE CORONARY ARTERY: ICD-10-CM

## 2022-07-18 DIAGNOSIS — Z01.818 PRE-OP TESTING: ICD-10-CM

## 2022-07-18 NOTE — PROGRESS NOTES
Pt returned my call to my personal cell phone on Friday evening, gave instructions, CTA done, she has lovenox prescription ready for , will review CTA with Dr. Anahi Ordonez.   Jesse Guo RN  Clinical Coordinator  CV Surgery

## 2022-07-18 NOTE — PROGRESS NOTES
CT reviewed by Dr. Flynn Severe, ok to proceed with surgery, d/w patient, she understands and agrees.   Chandni Colunga RN  Clinical Coordinator  CV Surgery

## 2022-07-19 ENCOUNTER — ANESTHESIA EVENT (OUTPATIENT)
Dept: CARDIAC SURGERY | Facility: HOSPITAL | Age: 67
End: 2022-07-19
Payer: MEDICARE

## 2022-07-19 LAB — SARS-COV-2 RNA RESP QL NAA+PROBE: NOT DETECTED

## 2022-07-20 ENCOUNTER — ANESTHESIA (OUTPATIENT)
Dept: CARDIAC SURGERY | Facility: HOSPITAL | Age: 67
End: 2022-07-20
Payer: MEDICARE

## 2022-07-20 ENCOUNTER — HOSPITAL ENCOUNTER (INPATIENT)
Facility: HOSPITAL | Age: 67
LOS: 5 days | Discharge: HOME | End: 2022-07-25
Attending: THORACIC SURGERY (CARDIOTHORACIC VASCULAR SURGERY) | Admitting: THORACIC SURGERY (CARDIOTHORACIC VASCULAR SURGERY)
Payer: MEDICARE

## 2022-07-20 ENCOUNTER — APPOINTMENT (OUTPATIENT)
Dept: GENERAL RADIOLOGY | Facility: HOSPITAL | Age: 67
DRG: 236 | End: 2022-07-20
Attending: PHYSICIAN ASSISTANT
Payer: MEDICARE

## 2022-07-20 ENCOUNTER — HOSPITAL ENCOUNTER (INPATIENT)
Facility: HOSPITAL | Age: 67
LOS: 5 days | Discharge: HOME | DRG: 236 | End: 2022-07-25
Attending: THORACIC SURGERY (CARDIOTHORACIC VASCULAR SURGERY) | Admitting: THORACIC SURGERY (CARDIOTHORACIC VASCULAR SURGERY)
Payer: MEDICARE

## 2022-07-20 ENCOUNTER — APPOINTMENT (OUTPATIENT)
Dept: GENERAL RADIOLOGY | Facility: HOSPITAL | Age: 67
End: 2022-07-20
Attending: PHYSICIAN ASSISTANT
Payer: MEDICARE

## 2022-07-20 DIAGNOSIS — I25.10 CAD (CORONARY ARTERY DISEASE), NATIVE CORONARY ARTERY: Primary | ICD-10-CM

## 2022-07-20 DIAGNOSIS — J90 PLEURAL EFFUSION: ICD-10-CM

## 2022-07-20 DIAGNOSIS — G47.00 INSOMNIA, UNSPECIFIED TYPE: ICD-10-CM

## 2022-07-20 DIAGNOSIS — Z01.818 PRE-OP TESTING: ICD-10-CM

## 2022-07-20 DIAGNOSIS — Z91.89 AT RISK FOR BLEEDING: ICD-10-CM

## 2022-07-20 LAB
APTT PPP: 27.7 SECONDS (ref 23.3–35.6)
APTT PPP: 28.1 SECONDS (ref 23.3–35.6)
ATRIAL RATE: 43 BPM
BASE EXCESS BLD CALC-SCNC: -1 MMOL/L
BASE EXCESS BLD CALC-SCNC: -4 MMOL/L
BASE EXCESS BLD CALC-SCNC: -4 MMOL/L
BASE EXCESS BLD CALC-SCNC: -6 MMOL/L
BASE EXCESS BLDA CALC-SCNC: -0.1 MMOL/L (ref ?–2)
BASE EXCESS BLDA CALC-SCNC: -2 MMOL/L (ref ?–30)
BASE EXCESS BLDA CALC-SCNC: -2 MMOL/L (ref ?–30)
BODY TEMPERATURE: 98.6 F
BUN BLD-MCNC: 11 MG/DL (ref 7–18)
CA-I BLD-SCNC: 1.17 MMOL/L (ref 1.12–1.32)
CA-I BLD-SCNC: 1.19 MMOL/L (ref 1.12–1.32)
CA-I BLD-SCNC: 1.22 MMOL/L (ref 0.95–1.32)
CA-I BLDA-SCNC: 1.01 MMOL/L (ref 1.12–1.32)
CA-I BLDA-SCNC: 1.26 MMOL/L (ref 1.12–1.32)
CALCIUM BLD-MCNC: 7.9 MG/DL (ref 8.5–10.1)
CHLORIDE SERPL-SCNC: 118 MMOL/L (ref 98–112)
CO2 BLD-SCNC: 21 MMOL/L (ref 22–32)
CO2 BLD-SCNC: 21 MMOL/L (ref 22–32)
CO2 BLD-SCNC: 23 MMOL/L (ref 22–32)
CO2 BLD-SCNC: 25 MMOL/L (ref 22–32)
CO2 BLDA-SCNC: 24 MMOL/L (ref 22–32)
CO2 BLDA-SCNC: 25 MMOL/L (ref 22–32)
CO2 SERPL-SCNC: 22 MMOL/L (ref 21–32)
COHGB MFR BLD: 1.9 % SAT (ref 0–3)
CREAT BLD-MCNC: 1.09 MG/DL
ERYTHROCYTE [DISTWIDTH] IN BLOOD BY AUTOMATED COUNT: 14 %
FIBRINOGEN PPP-MCNC: 198 MG/DL (ref 180–480)
FIBRINOGEN PPP-MCNC: 228 MG/DL (ref 180–480)
FIO2: 40 %
GLUCOSE BLD-MCNC: 115 MG/DL (ref 70–99)
GLUCOSE BLD-MCNC: 116 MG/DL (ref 70–99)
GLUCOSE BLD-MCNC: 117 MG/DL (ref 70–99)
GLUCOSE BLD-MCNC: 127 MG/DL (ref 70–99)
GLUCOSE BLD-MCNC: 128 MG/DL (ref 70–99)
GLUCOSE BLD-MCNC: 132 MG/DL (ref 70–99)
GLUCOSE BLD-MCNC: 134 MG/DL (ref 70–99)
GLUCOSE BLD-MCNC: 134 MG/DL (ref 70–99)
GLUCOSE BLD-MCNC: 143 MG/DL (ref 70–99)
GLUCOSE BLD-MCNC: 144 MG/DL (ref 70–99)
GLUCOSE BLD-MCNC: 145 MG/DL (ref 70–99)
GLUCOSE BLD-MCNC: 146 MG/DL (ref 70–99)
GLUCOSE BLD-MCNC: 157 MG/DL (ref 70–99)
GLUCOSE BLDA-MCNC: 202 MG/DL (ref 70–99)
GLUCOSE BLDA-MCNC: 216 MG/DL (ref 70–99)
HCO3 BLD-SCNC: 19.9 MEQ/L
HCO3 BLD-SCNC: 20.5 MEQ/L
HCO3 BLD-SCNC: 22 MEQ/L
HCO3 BLD-SCNC: 23.6 MEQ/L
HCO3 BLDA-SCNC: 22.8 MEQ/L (ref 22–26)
HCO3 BLDA-SCNC: 23.4 MEQ/L (ref 22–26)
HCO3 BLDA-SCNC: 24.8 MEQ/L (ref 21–27)
HCT VFR BLD AUTO: 36 %
HCT VFR BLD CALC: 30 %
HCT VFR BLD CALC: 34 %
HCT VFR BLD CALC: 35 %
HCT VFR BLD CALC: 35 %
HCT VFR BLDA CALC: 21 %
HCT VFR BLDA CALC: 27 %
HGB BLD-MCNC: 11.5 G/DL
HGB BLD-MCNC: 12.3 G/DL
INR BLD: 1.31 (ref 0.8–1.2)
INR BLD: 1.56 (ref 0.8–1.2)
ISTAT ACTIVATED CLOTTING TIME: 115 SECONDS (ref 74–137)
ISTAT ACTIVATED CLOTTING TIME: 115 SECONDS (ref 74–137)
ISTAT PATIENT TEMPERATURE: 32 DEGREE
ISTAT PATIENT TEMPERATURE: 37 DEGREE
LACTATE BLD-SCNC: 2.9 MMOL/L (ref 0.5–2)
MAGNESIUM SERPL-MCNC: 2.4 MG/DL (ref 1.6–2.6)
MCH RBC QN AUTO: 27 PG (ref 26–34)
MCHC RBC AUTO-ENTMCNC: 31.9 G/DL (ref 31–37)
MCV RBC AUTO: 84.5 FL
METHGB MFR BLD: 0.5 % SAT (ref 0.4–1.5)
OXYHGB MFR BLDA: 96.8 % (ref 92–100)
P AXIS: 12 DEGREES
P-R INTERVAL: 164 MS
PCO2 BLD: 33 MMHG
PCO2 BLD: 38.6 MMHG
PCO2 BLD: 39.1 MMHG
PCO2 BLD: 43.1 MMHG
PCO2 BLDA: 33 MMHG (ref 35–45)
PCO2 BLDA: 36 MM HG (ref 35–45)
PCO2 BLDA: 38.7 MMHG (ref 35–45)
PEEP: 5 CM H2O
PH BLD: 7.32 [PH]
PH BLD: 7.32 [PH]
PH BLD: 7.39 [PH]
PH BLD: 7.4 [PH]
PH BLDA: 7.38 [PH] (ref 7.35–7.45)
PH BLDA: 7.43 [PH] (ref 7.35–7.45)
PH BLDA: 7.44 [PH] (ref 7.35–7.45)
PLATELET # BLD AUTO: 164 10(3)UL (ref 150–450)
PLATELET # BLD AUTO: 184 10(3)UL (ref 150–450)
PO2 BLD: 105 MMHG
PO2 BLD: 160 MMHG
PO2 BLD: 331 MMHG
PO2 BLD: 93 MMHG
PO2 BLDA: 334 MMHG (ref 80–105)
PO2 BLDA: 90 MM HG (ref 80–100)
PO2 BLDA: <70 MMHG (ref 80–105)
POTASSIUM BLD-SCNC: 3.1 MMOL/L (ref 3.6–5.1)
POTASSIUM BLD-SCNC: 3.6 MMOL/L (ref 3.6–5.1)
POTASSIUM BLD-SCNC: 4.2 MMOL/L (ref 3.6–5.1)
POTASSIUM BLD-SCNC: 4.2 MMOL/L (ref 3.6–5.1)
POTASSIUM BLD-SCNC: 4.6 MMOL/L (ref 3.6–5.1)
POTASSIUM SERPL-SCNC: 3.2 MMOL/L (ref 3.5–5.1)
PRESSURE SUPPORT: 5 CM H2O
PROTHROMBIN TIME: 16.3 SECONDS (ref 11.6–14.8)
PROTHROMBIN TIME: 18.7 SECONDS (ref 11.6–14.8)
Q-T INTERVAL: 458 MS
QRS DURATION: 122 MS
QTC CALCULATION (BEZET): 569 MS
R AXIS: 30 DEGREES
RBC # BLD AUTO: 4.26 X10(6)UL
SAO2 % BLD: 100 %
SAO2 % BLD: 97 %
SAO2 % BLD: 98 %
SAO2 % BLD: 99 %
SAO2 % BLDA: 100 % (ref 92–100)
SAO2 % BLDA: 90 % (ref 92–100)
SODIUM BLD-SCNC: 141 MMOL/L (ref 136–145)
SODIUM BLD-SCNC: 143 MMOL/L (ref 135–145)
SODIUM BLD-SCNC: 145 MMOL/L (ref 136–145)
SODIUM BLD-SCNC: 146 MMOL/L (ref 136–145)
SODIUM BLD-SCNC: 146 MMOL/L (ref 136–145)
SODIUM BLDA-SCNC: 131 MMOL/L (ref 136–145)
SODIUM BLDA-SCNC: 141 MMOL/L (ref 136–145)
SODIUM BLDA-SCNC: 3.9 MMOL/L (ref 3.6–5.1)
SODIUM BLDA-SCNC: 5.6 MMOL/L (ref 3.6–5.1)
SODIUM SERPL-SCNC: 146 MMOL/L (ref 136–145)
T AXIS: 39 DEGREES
VENTRICULAR RATE: 93 BPM
WBC # BLD AUTO: 11.9 X10(3) UL (ref 4–11)

## 2022-07-20 PROCEDURE — 85384 FIBRINOGEN ACTIVITY: CPT | Performed by: THORACIC SURGERY (CARDIOTHORACIC VASCULAR SURGERY)

## 2022-07-20 PROCEDURE — 71045 X-RAY EXAM CHEST 1 VIEW: CPT | Performed by: PHYSICIAN ASSISTANT

## 2022-07-20 PROCEDURE — 85027 COMPLETE CBC AUTOMATED: CPT | Performed by: PHYSICIAN ASSISTANT

## 2022-07-20 PROCEDURE — 94002 VENT MGMT INPAT INIT DAY: CPT

## 2022-07-20 PROCEDURE — 85384 FIBRINOGEN ACTIVITY: CPT | Performed by: PHYSICIAN ASSISTANT

## 2022-07-20 PROCEDURE — 83050 HGB METHEMOGLOBIN QUAN: CPT | Performed by: ANESTHESIOLOGY

## 2022-07-20 PROCEDURE — 84295 ASSAY OF SERUM SODIUM: CPT

## 2022-07-20 PROCEDURE — C9113 INJ PANTOPRAZOLE SODIUM, VIA: HCPCS | Performed by: PHYSICIAN ASSISTANT

## 2022-07-20 PROCEDURE — 85018 HEMOGLOBIN: CPT | Performed by: ANESTHESIOLOGY

## 2022-07-20 PROCEDURE — P9045 ALBUMIN (HUMAN), 5%, 250 ML: HCPCS | Performed by: THORACIC SURGERY (CARDIOTHORACIC VASCULAR SURGERY)

## 2022-07-20 PROCEDURE — 82330 ASSAY OF CALCIUM: CPT

## 2022-07-20 PROCEDURE — 02100Z9 BYPASS CORONARY ARTERY, ONE ARTERY FROM LEFT INTERNAL MAMMARY, OPEN APPROACH: ICD-10-PCS | Performed by: THORACIC SURGERY (CARDIOTHORACIC VASCULAR SURGERY)

## 2022-07-20 PROCEDURE — 84132 ASSAY OF SERUM POTASSIUM: CPT

## 2022-07-20 PROCEDURE — 85730 THROMBOPLASTIN TIME PARTIAL: CPT | Performed by: THORACIC SURGERY (CARDIOTHORACIC VASCULAR SURGERY)

## 2022-07-20 PROCEDURE — 85347 COAGULATION TIME ACTIVATED: CPT

## 2022-07-20 PROCEDURE — 85014 HEMATOCRIT: CPT

## 2022-07-20 PROCEDURE — 88342 IMHCHEM/IMCYTCHM 1ST ANTB: CPT | Performed by: THORACIC SURGERY (CARDIOTHORACIC VASCULAR SURGERY)

## 2022-07-20 PROCEDURE — 83605 ASSAY OF LACTIC ACID: CPT | Performed by: ANESTHESIOLOGY

## 2022-07-20 PROCEDURE — 85730 THROMBOPLASTIN TIME PARTIAL: CPT | Performed by: PHYSICIAN ASSISTANT

## 2022-07-20 PROCEDURE — 93010 ELECTROCARDIOGRAM REPORT: CPT | Performed by: INTERNAL MEDICINE

## 2022-07-20 PROCEDURE — 88305 TISSUE EXAM BY PATHOLOGIST: CPT | Performed by: THORACIC SURGERY (CARDIOTHORACIC VASCULAR SURGERY)

## 2022-07-20 PROCEDURE — 82375 ASSAY CARBOXYHB QUANT: CPT | Performed by: ANESTHESIOLOGY

## 2022-07-20 PROCEDURE — 84295 ASSAY OF SERUM SODIUM: CPT | Performed by: ANESTHESIOLOGY

## 2022-07-20 PROCEDURE — 85610 PROTHROMBIN TIME: CPT | Performed by: PHYSICIAN ASSISTANT

## 2022-07-20 PROCEDURE — 76942 ECHO GUIDE FOR BIOPSY: CPT | Performed by: ANESTHESIOLOGY

## 2022-07-20 PROCEDURE — 02BN0ZZ EXCISION OF PERICARDIUM, OPEN APPROACH: ICD-10-PCS | Performed by: THORACIC SURGERY (CARDIOTHORACIC VASCULAR SURGERY)

## 2022-07-20 PROCEDURE — 83735 ASSAY OF MAGNESIUM: CPT | Performed by: PHYSICIAN ASSISTANT

## 2022-07-20 PROCEDURE — S0028 INJECTION, FAMOTIDINE, 20 MG: HCPCS | Performed by: ANESTHESIOLOGY

## 2022-07-20 PROCEDURE — 93005 ELECTROCARDIOGRAM TRACING: CPT

## 2022-07-20 PROCEDURE — 85610 PROTHROMBIN TIME: CPT | Performed by: THORACIC SURGERY (CARDIOTHORACIC VASCULAR SURGERY)

## 2022-07-20 PROCEDURE — 82962 GLUCOSE BLOOD TEST: CPT

## 2022-07-20 PROCEDURE — 82330 ASSAY OF CALCIUM: CPT | Performed by: ANESTHESIOLOGY

## 2022-07-20 PROCEDURE — 5A1221Z PERFORMANCE OF CARDIAC OUTPUT, CONTINUOUS: ICD-10-PCS | Performed by: THORACIC SURGERY (CARDIOTHORACIC VASCULAR SURGERY)

## 2022-07-20 PROCEDURE — 82803 BLOOD GASES ANY COMBINATION: CPT

## 2022-07-20 PROCEDURE — 84132 ASSAY OF SERUM POTASSIUM: CPT | Performed by: ANESTHESIOLOGY

## 2022-07-20 PROCEDURE — 82803 BLOOD GASES ANY COMBINATION: CPT | Performed by: ANESTHESIOLOGY

## 2022-07-20 PROCEDURE — 93312 ECHO TRANSESOPHAGEAL: CPT | Performed by: ANESTHESIOLOGY

## 2022-07-20 PROCEDURE — 021009W BYPASS CORONARY ARTERY, ONE ARTERY FROM AORTA WITH AUTOLOGOUS VENOUS TISSUE, OPEN APPROACH: ICD-10-PCS | Performed by: THORACIC SURGERY (CARDIOTHORACIC VASCULAR SURGERY)

## 2022-07-20 PROCEDURE — 85049 AUTOMATED PLATELET COUNT: CPT | Performed by: THORACIC SURGERY (CARDIOTHORACIC VASCULAR SURGERY)

## 2022-07-20 PROCEDURE — 80048 BASIC METABOLIC PNL TOTAL CA: CPT | Performed by: PHYSICIAN ASSISTANT

## 2022-07-20 PROCEDURE — 06BQ4ZZ EXCISION OF LEFT SAPHENOUS VEIN, PERCUTANEOUS ENDOSCOPIC APPROACH: ICD-10-PCS | Performed by: THORACIC SURGERY (CARDIOTHORACIC VASCULAR SURGERY)

## 2022-07-20 DEVICE — IMPLANTABLE DEVICE
Type: IMPLANTABLE DEVICE | Status: FUNCTIONAL
Brand: STERNALOCK® BLU SYSTEM

## 2022-07-20 RX ORDER — NICOTINE POLACRILEX 4 MG
30 LOZENGE BUCCAL
Status: DISCONTINUED | OUTPATIENT
Start: 2022-07-20 | End: 2022-07-25

## 2022-07-20 RX ORDER — ALBUMIN, HUMAN INJ 5% 5 %
250 SOLUTION INTRAVENOUS ONCE
Status: COMPLETED | OUTPATIENT
Start: 2022-07-20 | End: 2022-07-20

## 2022-07-20 RX ORDER — DOBUTAMINE HYDROCHLORIDE 200 MG/100ML
INJECTION INTRAVENOUS CONTINUOUS PRN
Status: DISCONTINUED | OUTPATIENT
Start: 2022-07-20 | End: 2022-07-25

## 2022-07-20 RX ORDER — MAGNESIUM SULFATE 1 G/100ML
1 INJECTION INTRAVENOUS AS NEEDED
Status: DISCONTINUED | OUTPATIENT
Start: 2022-07-20 | End: 2022-07-25

## 2022-07-20 RX ORDER — ALBUMIN, HUMAN INJ 5% 5 %
250 SOLUTION INTRAVENOUS ONCE AS NEEDED
Status: DISCONTINUED | OUTPATIENT
Start: 2022-07-20 | End: 2022-07-25

## 2022-07-20 RX ORDER — DIAZEPAM 2 MG/1
5 TABLET ORAL NIGHTLY PRN
Status: DISCONTINUED | OUTPATIENT
Start: 2022-07-20 | End: 2022-07-25

## 2022-07-20 RX ORDER — CEFAZOLIN SODIUM/WATER 2 G/20 ML
SYRINGE (ML) INTRAVENOUS AS NEEDED
Status: DISCONTINUED | OUTPATIENT
Start: 2022-07-20 | End: 2022-07-20 | Stop reason: SURG

## 2022-07-20 RX ORDER — METHYLPREDNISOLONE SODIUM SUCCINATE 500 MG/8ML
INJECTION INTRAMUSCULAR; INTRAVENOUS AS NEEDED
Status: DISCONTINUED | OUTPATIENT
Start: 2022-07-20 | End: 2022-07-20 | Stop reason: SURG

## 2022-07-20 RX ORDER — NITROGLYCERIN 20 MG/100ML
INJECTION INTRAVENOUS CONTINUOUS PRN
Status: DISCONTINUED | OUTPATIENT
Start: 2022-07-20 | End: 2022-07-25

## 2022-07-20 RX ORDER — CEFAZOLIN SODIUM 1 G/3ML
INJECTION, POWDER, FOR SOLUTION INTRAMUSCULAR; INTRAVENOUS AS NEEDED
Status: DISCONTINUED | OUTPATIENT
Start: 2022-07-20 | End: 2022-07-20 | Stop reason: SURG

## 2022-07-20 RX ORDER — DOBUTAMINE HYDROCHLORIDE 200 MG/100ML
INJECTION INTRAVENOUS CONTINUOUS PRN
Status: DISCONTINUED | OUTPATIENT
Start: 2022-07-20 | End: 2022-07-20 | Stop reason: SURG

## 2022-07-20 RX ORDER — BISACODYL 10 MG
10 SUPPOSITORY, RECTAL RECTAL
Status: DISCONTINUED | OUTPATIENT
Start: 2022-07-20 | End: 2022-07-25

## 2022-07-20 RX ORDER — HEPARIN SODIUM 1000 [USP'U]/ML
INJECTION, SOLUTION INTRAVENOUS; SUBCUTANEOUS AS NEEDED
Status: DISCONTINUED | OUTPATIENT
Start: 2022-07-20 | End: 2022-07-20 | Stop reason: SURG

## 2022-07-20 RX ORDER — DIPHENHYDRAMINE HYDROCHLORIDE 50 MG/ML
12.5 INJECTION INTRAMUSCULAR; INTRAVENOUS EVERY 4 HOURS PRN
Status: DISCONTINUED | OUTPATIENT
Start: 2022-07-20 | End: 2022-07-25

## 2022-07-20 RX ORDER — IPRATROPIUM BROMIDE AND ALBUTEROL SULFATE 2.5; .5 MG/3ML; MG/3ML
3 SOLUTION RESPIRATORY (INHALATION) EVERY 4 HOURS PRN
Status: DISCONTINUED | OUTPATIENT
Start: 2022-07-20 | End: 2022-07-25

## 2022-07-20 RX ORDER — MORPHINE SULFATE 4 MG/ML
4 INJECTION, SOLUTION INTRAMUSCULAR; INTRAVENOUS EVERY 2 HOUR PRN
Status: DISCONTINUED | OUTPATIENT
Start: 2022-07-20 | End: 2022-07-25

## 2022-07-20 RX ORDER — CHLORHEXIDINE GLUCONATE 0.12 MG/ML
15 RINSE ORAL
Status: DISCONTINUED | OUTPATIENT
Start: 2022-07-20 | End: 2022-07-21

## 2022-07-20 RX ORDER — NICOTINE POLACRILEX 4 MG
15 LOZENGE BUCCAL
Status: DISCONTINUED | OUTPATIENT
Start: 2022-07-20 | End: 2022-07-25

## 2022-07-20 RX ORDER — PHENYLEPHRINE HCL 10 MG/ML
VIAL (ML) INJECTION AS NEEDED
Status: DISCONTINUED | OUTPATIENT
Start: 2022-07-20 | End: 2022-07-20 | Stop reason: SURG

## 2022-07-20 RX ORDER — PANTOPRAZOLE SODIUM 40 MG/1
40 TABLET, DELAYED RELEASE ORAL
Status: DISCONTINUED | OUTPATIENT
Start: 2022-07-20 | End: 2022-07-25

## 2022-07-20 RX ORDER — SODIUM CHLORIDE 9 MG/ML
INJECTION, SOLUTION INTRAVENOUS CONTINUOUS PRN
Status: DISCONTINUED | OUTPATIENT
Start: 2022-07-20 | End: 2022-07-20 | Stop reason: SURG

## 2022-07-20 RX ORDER — NALOXONE HYDROCHLORIDE 0.4 MG/ML
0.08 INJECTION, SOLUTION INTRAMUSCULAR; INTRAVENOUS; SUBCUTANEOUS
Status: DISCONTINUED | OUTPATIENT
Start: 2022-07-20 | End: 2022-07-25

## 2022-07-20 RX ORDER — ROCURONIUM BROMIDE 10 MG/ML
INJECTION, SOLUTION INTRAVENOUS AS NEEDED
Status: DISCONTINUED | OUTPATIENT
Start: 2022-07-20 | End: 2022-07-20 | Stop reason: SURG

## 2022-07-20 RX ORDER — ASPIRIN 81 MG/1
81 TABLET ORAL DAILY
Status: DISCONTINUED | OUTPATIENT
Start: 2022-07-20 | End: 2022-07-25

## 2022-07-20 RX ORDER — ACETAMINOPHEN 10 MG/ML
1000 INJECTION, SOLUTION INTRAVENOUS EVERY 6 HOURS
Status: DISCONTINUED | OUTPATIENT
Start: 2022-07-20 | End: 2022-07-21

## 2022-07-20 RX ORDER — DIPHENHYDRAMINE HYDROCHLORIDE 50 MG/ML
INJECTION INTRAMUSCULAR; INTRAVENOUS AS NEEDED
Status: DISCONTINUED | OUTPATIENT
Start: 2022-07-20 | End: 2022-07-20 | Stop reason: SURG

## 2022-07-20 RX ORDER — MIDAZOLAM HYDROCHLORIDE 1 MG/ML
1 INJECTION INTRAMUSCULAR; INTRAVENOUS EVERY 30 MIN PRN
Status: DISCONTINUED | OUTPATIENT
Start: 2022-07-20 | End: 2022-07-21

## 2022-07-20 RX ORDER — DEXTROSE AND SODIUM CHLORIDE 5; .45 G/100ML; G/100ML
INJECTION, SOLUTION INTRAVENOUS CONTINUOUS
Status: DISCONTINUED | OUTPATIENT
Start: 2022-07-20 | End: 2022-07-21

## 2022-07-20 RX ORDER — PROTAMINE SULFATE 10 MG/ML
INJECTION, SOLUTION INTRAVENOUS AS NEEDED
Status: DISCONTINUED | OUTPATIENT
Start: 2022-07-20 | End: 2022-07-20 | Stop reason: SURG

## 2022-07-20 RX ORDER — SODIUM PHOSPHATE, DIBASIC AND SODIUM PHOSPHATE, MONOBASIC 7; 19 G/133ML; G/133ML
1 ENEMA RECTAL ONCE AS NEEDED
Status: DISCONTINUED | OUTPATIENT
Start: 2022-07-20 | End: 2022-07-25

## 2022-07-20 RX ORDER — MELATONIN
3 NIGHTLY PRN
Status: DISCONTINUED | OUTPATIENT
Start: 2022-07-20 | End: 2022-07-25

## 2022-07-20 RX ORDER — POTASSIUM CHLORIDE 29.8 MG/ML
40 INJECTION INTRAVENOUS AS NEEDED
Status: DISCONTINUED | OUTPATIENT
Start: 2022-07-20 | End: 2022-07-25

## 2022-07-20 RX ORDER — CEFAZOLIN SODIUM/WATER 2 G/20 ML
2 SYRINGE (ML) INTRAVENOUS EVERY 8 HOURS
Status: COMPLETED | OUTPATIENT
Start: 2022-07-20 | End: 2022-07-22

## 2022-07-20 RX ORDER — MAGNESIUM SULFATE HEPTAHYDRATE 40 MG/ML
2 INJECTION, SOLUTION INTRAVENOUS AS NEEDED
Status: DISCONTINUED | OUTPATIENT
Start: 2022-07-20 | End: 2022-07-25

## 2022-07-20 RX ORDER — FAMOTIDINE 10 MG/ML
INJECTION, SOLUTION INTRAVENOUS AS NEEDED
Status: DISCONTINUED | OUTPATIENT
Start: 2022-07-20 | End: 2022-07-20 | Stop reason: SURG

## 2022-07-20 RX ORDER — SODIUM CHLORIDE 9 MG/ML
INJECTION, SOLUTION INTRAVENOUS CONTINUOUS
Status: DISCONTINUED | OUTPATIENT
Start: 2022-07-20 | End: 2022-07-25

## 2022-07-20 RX ORDER — POLYETHYLENE GLYCOL 3350 17 G/17G
17 POWDER, FOR SOLUTION ORAL DAILY PRN
Status: DISCONTINUED | OUTPATIENT
Start: 2022-07-20 | End: 2022-07-25

## 2022-07-20 RX ORDER — NITROGLYCERIN 20 MG/100ML
INJECTION INTRAVENOUS CONTINUOUS PRN
Status: DISCONTINUED | OUTPATIENT
Start: 2022-07-20 | End: 2022-07-20 | Stop reason: SURG

## 2022-07-20 RX ORDER — DEXMEDETOMIDINE HYDROCHLORIDE 4 UG/ML
INJECTION, SOLUTION INTRAVENOUS CONTINUOUS PRN
Status: DISCONTINUED | OUTPATIENT
Start: 2022-07-20 | End: 2022-07-20 | Stop reason: SURG

## 2022-07-20 RX ORDER — SENNOSIDES 8.6 MG
17.2 TABLET ORAL NIGHTLY PRN
Status: DISCONTINUED | OUTPATIENT
Start: 2022-07-20 | End: 2022-07-25

## 2022-07-20 RX ORDER — MORPHINE SULFATE 2 MG/ML
2 INJECTION, SOLUTION INTRAMUSCULAR; INTRAVENOUS EVERY 2 HOUR PRN
Status: DISCONTINUED | OUTPATIENT
Start: 2022-07-20 | End: 2022-07-25

## 2022-07-20 RX ORDER — POTASSIUM CHLORIDE 14.9 MG/ML
20 INJECTION INTRAVENOUS AS NEEDED
Status: DISCONTINUED | OUTPATIENT
Start: 2022-07-20 | End: 2022-07-25

## 2022-07-20 RX ORDER — ONDANSETRON 2 MG/ML
4 INJECTION INTRAMUSCULAR; INTRAVENOUS EVERY 6 HOURS PRN
Status: DISCONTINUED | OUTPATIENT
Start: 2022-07-20 | End: 2022-07-25

## 2022-07-20 RX ORDER — DEXMEDETOMIDINE HYDROCHLORIDE 4 UG/ML
INJECTION, SOLUTION INTRAVENOUS CONTINUOUS
Status: DISCONTINUED | OUTPATIENT
Start: 2022-07-20 | End: 2022-07-21

## 2022-07-20 RX ORDER — MIDAZOLAM HYDROCHLORIDE 1 MG/ML
INJECTION INTRAMUSCULAR; INTRAVENOUS AS NEEDED
Status: DISCONTINUED | OUTPATIENT
Start: 2022-07-20 | End: 2022-07-20 | Stop reason: SURG

## 2022-07-20 RX ORDER — DEXTROSE MONOHYDRATE 25 G/50ML
50 INJECTION, SOLUTION INTRAVENOUS
Status: DISCONTINUED | OUTPATIENT
Start: 2022-07-20 | End: 2022-07-25

## 2022-07-20 RX ORDER — SODIUM CHLORIDE 9 MG/ML
INJECTION, SOLUTION INTRAVENOUS CONTINUOUS
Status: ACTIVE | OUTPATIENT
Start: 2022-07-20 | End: 2022-07-20

## 2022-07-20 RX ADMIN — ROCURONIUM BROMIDE 100 MG: 10 INJECTION, SOLUTION INTRAVENOUS at 09:16:00

## 2022-07-20 RX ADMIN — PHENYLEPHRINE HCL 100 MCG: 10 MG/ML VIAL (ML) INJECTION at 10:36:00

## 2022-07-20 RX ADMIN — SODIUM CHLORIDE: 9 INJECTION, SOLUTION INTRAVENOUS at 06:35:00

## 2022-07-20 RX ADMIN — CEFAZOLIN SODIUM/WATER 2 G: 2 G/20 ML SYRINGE (ML) INTRAVENOUS at 07:15:00

## 2022-07-20 RX ADMIN — ROCURONIUM BROMIDE 50 MG: 10 INJECTION, SOLUTION INTRAVENOUS at 10:31:00

## 2022-07-20 RX ADMIN — PROTAMINE SULFATE 50 MG: 10 INJECTION, SOLUTION INTRAVENOUS at 10:38:00

## 2022-07-20 RX ADMIN — PROTAMINE SULFATE 50 MG: 10 INJECTION, SOLUTION INTRAVENOUS at 10:32:00

## 2022-07-20 RX ADMIN — NITROGLYCERIN 5 MCG/MIN: 20 INJECTION INTRAVENOUS at 07:46:00

## 2022-07-20 RX ADMIN — MIDAZOLAM HYDROCHLORIDE 2 MG: 1 INJECTION INTRAMUSCULAR; INTRAVENOUS at 06:36:00

## 2022-07-20 RX ADMIN — ROCURONIUM BROMIDE 50 MG: 10 INJECTION, SOLUTION INTRAVENOUS at 11:41:00

## 2022-07-20 RX ADMIN — MIDAZOLAM HYDROCHLORIDE 4 MG: 1 INJECTION INTRAMUSCULAR; INTRAVENOUS at 09:16:00

## 2022-07-20 RX ADMIN — PROTAMINE SULFATE 50 MG: 10 INJECTION, SOLUTION INTRAVENOUS at 10:43:00

## 2022-07-20 RX ADMIN — PHENYLEPHRINE HCL 100 MCG: 10 MG/ML VIAL (ML) INJECTION at 10:38:00

## 2022-07-20 RX ADMIN — SODIUM CHLORIDE: 9 INJECTION, SOLUTION INTRAVENOUS at 12:08:00

## 2022-07-20 RX ADMIN — HEPARIN SODIUM 24000 UNITS: 1000 INJECTION, SOLUTION INTRAVENOUS; SUBCUTANEOUS at 08:44:00

## 2022-07-20 RX ADMIN — CEFAZOLIN SODIUM 2 G: 1 INJECTION, POWDER, FOR SOLUTION INTRAMUSCULAR; INTRAVENOUS at 11:11:00

## 2022-07-20 RX ADMIN — DEXMEDETOMIDINE HYDROCHLORIDE 0.5 MCG/KG/HR: 4 INJECTION, SOLUTION INTRAVENOUS at 07:15:00

## 2022-07-20 RX ADMIN — METHYLPREDNISOLONE SODIUM SUCCINATE 500 MG: 500 INJECTION INTRAMUSCULAR; INTRAVENOUS at 07:08:00

## 2022-07-20 RX ADMIN — PHENYLEPHRINE HCL 100 MCG: 10 MG/ML VIAL (ML) INJECTION at 10:39:00

## 2022-07-20 RX ADMIN — PHENYLEPHRINE HCL 100 MCG: 10 MG/ML VIAL (ML) INJECTION at 10:43:00

## 2022-07-20 RX ADMIN — DOBUTAMINE HYDROCHLORIDE 5 MCG/KG/MIN: 200 INJECTION INTRAVENOUS at 10:21:00

## 2022-07-20 RX ADMIN — PHENYLEPHRINE HCL 100 MCG: 10 MG/ML VIAL (ML) INJECTION at 08:14:00

## 2022-07-20 RX ADMIN — DIPHENHYDRAMINE HYDROCHLORIDE 50 MG: 50 INJECTION INTRAMUSCULAR; INTRAVENOUS at 06:36:00

## 2022-07-20 RX ADMIN — PROTAMINE SULFATE 50 MG: 10 INJECTION, SOLUTION INTRAVENOUS at 10:35:00

## 2022-07-20 RX ADMIN — PROTAMINE SULFATE 50 MG: 10 INJECTION, SOLUTION INTRAVENOUS at 10:36:00

## 2022-07-20 RX ADMIN — ROCURONIUM BROMIDE 100 MG: 10 INJECTION, SOLUTION INTRAVENOUS at 06:46:00

## 2022-07-20 RX ADMIN — PROTAMINE SULFATE 50 MG: 10 INJECTION, SOLUTION INTRAVENOUS at 10:33:00

## 2022-07-20 RX ADMIN — ROCURONIUM BROMIDE 50 MG: 10 INJECTION, SOLUTION INTRAVENOUS at 08:34:00

## 2022-07-20 RX ADMIN — PHENYLEPHRINE HCL 100 MCG: 10 MG/ML VIAL (ML) INJECTION at 09:14:00

## 2022-07-20 RX ADMIN — FAMOTIDINE 20 MG: 10 INJECTION, SOLUTION INTRAVENOUS at 06:36:00

## 2022-07-20 RX ADMIN — MIDAZOLAM HYDROCHLORIDE 2 MG: 1 INJECTION INTRAMUSCULAR; INTRAVENOUS at 06:46:00

## 2022-07-20 RX ADMIN — PHENYLEPHRINE HCL 100 MCG: 10 MG/ML VIAL (ML) INJECTION at 08:57:00

## 2022-07-20 NOTE — ANESTHESIA PROCEDURE NOTES
Airway  Date/Time: 7/20/2022 6:48 AM  Urgency: elective    Airway not difficult    General Information and Staff    Patient location during procedure: OR  Anesthesiologist: Charlotte Lim MD  Performed: anesthesiologist     Indications and Patient Condition  Indications for airway management: anesthesia  Sedation level: deep  Preoxygenated: yes  Patient position: sniffing  Mask difficulty assessment: 1 - vent by mask    Final Airway Details  Final airway type: endotracheal airway      Successful airway: ETT  Cuffed: yes   Successful intubation technique: direct laryngoscopy  Endotracheal tube insertion site: oral  Blade: Swetha  Blade size: #3  ETT size (mm): 8.0    Cormack-Lehane Classification: grade I - full view of glottis  Placement verified by: chest auscultation and capnometry   Cuff volume (mL): 9  Measured from: lips  ETT to lips (cm): 21  Number of attempts at approach: 1

## 2022-07-20 NOTE — DIETARY NOTE
Clinical Nutrition     Dietitian consult received per cardiac rehab standing order. Pt to be educated by cardiac rehab staff and encouraged to attend outpatient classes taught by [facility]. [facility] available PRN.     Renaldo Mcgowan RD, LDN, [address]   Clinical Dietitian  Spectra: [phone]  Pager: [phone]

## 2022-07-20 NOTE — ANESTHESIA PROCEDURE NOTES
Arterial Line  Performed by: Dudley Rondon MD  Authorized by: Dudley Rondon MD     General Information and Staff    Procedure Start:  7/20/2022 6:38 AM  Procedure End:  7/20/2022 6:45 AM  Anesthesiologist:  Dudley Rondon MD  Performed By:  Anesthesiologist  Patient Location:  OR  Indication: continuous blood pressure monitoring and blood sampling needed    Site Identification: real time ultrasound guided and surface landmarks    Preanesthetic Checklist: 2 patient identifiers, IV checked, risks and benefits discussed, monitors and equipment checked, pre-op evaluation, timeout performed, anesthesia consent and sterile technique used    Procedure Details    Catheter Size:  20 G  Catheter Length:  1 and 3/4 inchCatheter Type:  Arrow  Seldinger Technique?: Yes    Laterality:  LeftSite:  Radial artery  Site Prep: chlorhexidine  Line Secured:  Wrist Brace, tape and Tegaderm    Assessment    Events: patient tolerated procedure well with no complications      Medications      Additional Comments

## 2022-07-20 NOTE — PLAN OF CARE
Received patient from [place] [id number] at 1200. Patient intubated, sedated on precedex gtt. Minimal chest tube output. EKG showed ST elevation in multiple leads, MD aware. SBP drop to 70-80s, Dr. Kishore Dow notified, orders to give 250cc albumin. Dobutamine gtt infusing. Palpable pulses. Ventricular pacing wires. Insulin gtt, algorithm B column 6. Bustillos in place, good urine output. Later this afternoon, sedation turned off, patient followed commands/moved all extremities. Breathing trial started, patient tolerated well, ABG paged to Dr. Lobo Cmapa, received orders to extubate. Extubated at 66 91 21, transitioned to NC 4L. Patient complained of severe sternal incision pain, dilaudid pca started. Patient stated no improvement in pain w/ pca pump; however, patient often fell asleep soon after rating pain 10/10. See MAR for pain management. Plan of care updated with patient and spouse.

## 2022-07-20 NOTE — ANESTHESIA PROCEDURE NOTES
Central Line  Performed by: Chata Jones MD  Authorized by: Chata Jones MD     General Information and Staff    Procedure Start:  7/20/2022 6:49 AM  Procedure End:  7/20/2022 7:02 AM  Anesthesiologist:  Chata Jones MD  Performed by:   Anesthesiologist  Patient Location:  OR  Indication: central venous access and CVP monitoring    Site Identification: real time ultrasound guided and image stored and retrievable    Preanesthetic Checklist: 2 patient identifiers, IV checked, risks and benefits discussed, monitors and equipment checked, pre-op evaluation, timeout performed, anesthesia consent and sterile technique used    Procedure Detail    Patient Position:  Trendelenburg  Laterality:  Right  Site:  Internal jugular  Prep:  Chloraprep  Catheter Size:  9 Fr  Catheter Type:  MAC introducer  Number of Lumens:  Double lumen  Procedure Detail: target vein identified, needle advanced into vein and blood aspirated and guidewire advanced into vein    Seldinger Technique?: Yes    Intravenous Verification: verified by ultrasound and venous blood return    Post Insertion: all ports aspirated, all ports flushed easily, guidewire was removed intact, line was sutured in place and dressing was applied      Assessment    Events: patient tolerated procedure well with no complications      PA Catheter Placement    PA Catheter Placed?: Yes    PA Catheter Type:  Oximetric  PA Catheter Size:  8  Laterality:  Right  Site:  Internal jugular  Placement Confirmation: pressure tracing changes and verified by ROSCOE    PA Catheter Depth (cm):  50  Events: patient tolerated procedure well with no complications      Additional Comments

## 2022-07-20 NOTE — CONSULTS
Pharmacy consulted to dose post-op antibiotics. SCr 1.09 mg/dL; estimated CrCl 44.4 mL/min. Ok for Ancef 2 grams IV every 8 hours x5 doses as ordered. Vancomycin adjusted to 1 gram IV every 12 hours x2 doses.     Eric Bell, PharmD  07/20/22 1:26 PM

## 2022-07-20 NOTE — ANESTHESIA PROCEDURE NOTES
Procedure Performed: ROSCOE      Start Time:  7/20/2022 7:30 AM       End Time:   7/20/2022 11:15 AM    Preanesthesia Checklist:  Patient identified, IV assessed, risks and benefits discussed, monitors and equipment assessed, procedure being performed at surgeon's request and anesthesia consent obtained. General Procedure Information  Diagnostic Indications for Echo:  assessment of ascending aorta and hemodynamic monitoring  Physician Requesting Echo: Chata Elkins MD  Location performed:  OR  Intubated  Bite block placed  Heart visualized  Probe Insertion:  Easy  Probe Type:  Multiplane  Modalities:  2D only, color flow mapping, pulse wave Doppler and continuous wave Doppler    Echocardiographic and Doppler Measurements    Ventricles    Right Ventricle:  Cavity size normal.  Hypertrophy not present. Thrombus not present. Global function normal.    Left Ventricle:  Cavity size normal.  Hypertrophy not present. Thrombus not present. Global Function normal.  Ejection Fraction 55%. Ventricular Regional Function:  1- Basal Anteroseptal:  normal  2- Basal Anterior:  normal  3- Basal Anterolateral:  normal  4- Basal Inferolateral:  hypokinetic  5- Basal Inferior:  hypokinetic  6- Basal Inferoseptal:  hypokinetic  7- Mid Anteroseptal:  normal  8- Mid Anterior:  normal  9- Mid Anterolateral:  normal  10- Mid Inferolateral:  hypokinetic  11- Mid Inferior:  hypokinetic  12- Mid Inferoseptal:  hypokinetic  13- Apical Anterior:  normal  14- Apical Lateral:  normal  15- Apical Inferior:  hypokinetic  16- Apical Septal:  normal  17- [place]:  hypokinetic      Valves    Aortic Valve: Annulus normal.  Stenosis not present. Regurgitation absent. Leaflets normal.  Leaflet motions normal.      Mitral Valve: Annulus normal.  Stenosis not present. Regurgitation +1. Leaflets normal.  Leaflet motions normal.      Tricuspid Valve: Annulus normal.  Stenosis not present. Regurgitation +1.   Leaflets normal.  Leaflet motions normal.    Pulmonic Valve: Annulus normal.          Aorta    Ascending Aorta:  Size normal.  Dissection not present. Plaque thickness less than 3 mm. Mobile plaque not present. Aortic Arch:  Size normal.  Dissection not present. Plaque thickness less than 3 mm. Mobile plaque not present. Descending Aorta:  Size normal.  Dissection not present. Plaque thickness less than 3 mm. Mobile plaque not present. Atria    Right Atrium:  Size normal.  Spontaneous echo contrast not present. Thrombus not present. Tumor not present. Device present. Left Atrium:  Size normal.  Spontaneous echo contrast not present. Thrombus not present. Tumor not present. Device not present.     Left atrial appendage normal.      Septa    Atrial Septum:  Intra-atrial septal morphology normal.      Ventricular Septum:  Intra-ventricular septum morphology normal.      Diastolic Function Measurements:  Diastolic Dysfunction Grade= I  E= ms  A= ms  E/A Ratio=   DT= ms  S/D=  IVRT= ms    Other Findings  Pericardium:  normal  Pleural Effusion:  none  Pulmonary Arteries:  normal  Pulmonary Venous Flow:  normal    Anesthesia Information  Performed Personally  Anesthesiologist:  Geovani Mendez MD      Post    Ventricular FXN:  Global FXN: Unchanged and Improved   Regional FXN: Improved  Valve FXN:  Native Valve:No change      Comments: No aortic dissection    Complications:None

## 2022-07-20 NOTE — PROGRESS NOTES
Anesthesia Ventilator Management    Patient is s/p CABG 2V  POD#0. Patient is currently sedated and intubated. Vent settings: PRVC 500/12/70%/5  ABG, CXR pending    Dex@ 0.5  @ 5    Will wean FiO2 as tolerated.   Plan for extubation after CPAP trial.

## 2022-07-21 ENCOUNTER — APPOINTMENT (OUTPATIENT)
Dept: GENERAL RADIOLOGY | Facility: HOSPITAL | Age: 67
DRG: 236 | End: 2022-07-21
Attending: PHYSICIAN ASSISTANT
Payer: MEDICARE

## 2022-07-21 ENCOUNTER — APPOINTMENT (OUTPATIENT)
Dept: GENERAL RADIOLOGY | Facility: HOSPITAL | Age: 67
End: 2022-07-21
Attending: PHYSICIAN ASSISTANT
Payer: MEDICARE

## 2022-07-21 LAB
BASOPHILS # BLD AUTO: 0.02 X10(3) UL (ref 0–0.2)
BASOPHILS NFR BLD AUTO: 0.1 %
BUN BLD-MCNC: 9 MG/DL (ref 7–18)
CALCIUM BLD-MCNC: 8 MG/DL (ref 8.5–10.1)
CHLORIDE SERPL-SCNC: 114 MMOL/L (ref 98–112)
CO2 SERPL-SCNC: 23 MMOL/L (ref 21–32)
CREAT BLD-MCNC: 0.86 MG/DL
EOSINOPHIL # BLD AUTO: 0 X10(3) UL (ref 0–0.7)
EOSINOPHIL NFR BLD AUTO: 0 %
ERYTHROCYTE [DISTWIDTH] IN BLOOD BY AUTOMATED COUNT: 14.6 %
GLUCOSE BLD-MCNC: 103 MG/DL (ref 70–99)
GLUCOSE BLD-MCNC: 115 MG/DL (ref 70–99)
GLUCOSE BLD-MCNC: 115 MG/DL (ref 70–99)
GLUCOSE BLD-MCNC: 116 MG/DL (ref 70–99)
GLUCOSE BLD-MCNC: 121 MG/DL (ref 70–99)
GLUCOSE BLD-MCNC: 123 MG/DL (ref 70–99)
GLUCOSE BLD-MCNC: 125 MG/DL (ref 70–99)
GLUCOSE BLD-MCNC: 128 MG/DL (ref 70–99)
GLUCOSE BLD-MCNC: 129 MG/DL (ref 70–99)
GLUCOSE BLD-MCNC: 148 MG/DL (ref 70–99)
GLUCOSE BLD-MCNC: 148 MG/DL (ref 70–99)
GLUCOSE BLD-MCNC: 85 MG/DL (ref 70–99)
GLUCOSE BLD-MCNC: 96 MG/DL (ref 70–99)
HCT VFR BLD AUTO: 32.5 %
HGB BLD-MCNC: 10.5 G/DL
IMM GRANULOCYTES # BLD AUTO: 0.06 X10(3) UL (ref 0–1)
IMM GRANULOCYTES NFR BLD: 0.4 %
LYMPHOCYTES # BLD AUTO: 0.89 X10(3) UL (ref 1–4)
LYMPHOCYTES NFR BLD AUTO: 5.8 %
MAGNESIUM SERPL-MCNC: 2.2 MG/DL (ref 1.6–2.6)
MCH RBC QN AUTO: 27.8 PG (ref 26–34)
MCHC RBC AUTO-ENTMCNC: 32.3 G/DL (ref 31–37)
MCV RBC AUTO: 86 FL
MONOCYTES # BLD AUTO: 1.63 X10(3) UL (ref 0.1–1)
MONOCYTES NFR BLD AUTO: 10.7 %
NEUTROPHILS # BLD AUTO: 12.62 X10 (3) UL (ref 1.5–7.7)
NEUTROPHILS # BLD AUTO: 12.62 X10(3) UL (ref 1.5–7.7)
NEUTROPHILS NFR BLD AUTO: 83 %
PLATELET # BLD AUTO: 190 10(3)UL (ref 150–450)
POTASSIUM SERPL-SCNC: 4.1 MMOL/L (ref 3.5–5.1)
RBC # BLD AUTO: 3.78 X10(6)UL
SODIUM SERPL-SCNC: 141 MMOL/L (ref 136–145)
WBC # BLD AUTO: 15.2 X10(3) UL (ref 4–11)

## 2022-07-21 PROCEDURE — 83735 ASSAY OF MAGNESIUM: CPT | Performed by: PHYSICIAN ASSISTANT

## 2022-07-21 PROCEDURE — 80048 BASIC METABOLIC PNL TOTAL CA: CPT | Performed by: PHYSICIAN ASSISTANT

## 2022-07-21 PROCEDURE — 97162 PT EVAL MOD COMPLEX 30 MIN: CPT

## 2022-07-21 PROCEDURE — 93010 ELECTROCARDIOGRAM REPORT: CPT | Performed by: INTERNAL MEDICINE

## 2022-07-21 PROCEDURE — 71045 X-RAY EXAM CHEST 1 VIEW: CPT | Performed by: PHYSICIAN ASSISTANT

## 2022-07-21 PROCEDURE — 93005 ELECTROCARDIOGRAM TRACING: CPT

## 2022-07-21 PROCEDURE — 97530 THERAPEUTIC ACTIVITIES: CPT

## 2022-07-21 PROCEDURE — 85025 COMPLETE CBC W/AUTO DIFF WBC: CPT | Performed by: PHYSICIAN ASSISTANT

## 2022-07-21 PROCEDURE — 97535 SELF CARE MNGMENT TRAINING: CPT

## 2022-07-21 PROCEDURE — 97116 GAIT TRAINING THERAPY: CPT

## 2022-07-21 PROCEDURE — 82962 GLUCOSE BLOOD TEST: CPT

## 2022-07-21 PROCEDURE — 97166 OT EVAL MOD COMPLEX 45 MIN: CPT

## 2022-07-21 RX ORDER — HYDROCODONE BITARTRATE AND ACETAMINOPHEN 10; 325 MG/1; MG/1
1 TABLET ORAL EVERY 4 HOURS PRN
Status: DISCONTINUED | OUTPATIENT
Start: 2022-07-21 | End: 2022-07-25

## 2022-07-21 RX ORDER — HYDROCODONE BITARTRATE AND ACETAMINOPHEN 10; 325 MG/1; MG/1
2 TABLET ORAL EVERY 4 HOURS PRN
Status: DISCONTINUED | OUTPATIENT
Start: 2022-07-21 | End: 2022-07-25

## 2022-07-21 NOTE — PLAN OF CARE
Assumed care of pt at 1930  Resting in bed, dilaudid PCA for pain control and PRN morphine for breakthrough pain    PRN valium given for anxiety    VSS, dobutamine titrated off    See flowsheet for chest tube and urine output    Up to chair this am

## 2022-07-21 NOTE — PLAN OF CARE
Received patient this AM up in chair. Ambulating halls in short distances at a time. Pain poorly controlled per patient, dilaudid PCA and norco PRN. [place] and arterial line d/cd per CV surgery. Insulin d/cd and on sliding scale. Tolerating diet. Remains on O2 per NC, encouraging use of IS. Spouse at bedside.

## 2022-07-22 ENCOUNTER — APPOINTMENT (OUTPATIENT)
Dept: GENERAL RADIOLOGY | Facility: HOSPITAL | Age: 67
End: 2022-07-22
Attending: THORACIC SURGERY (CARDIOTHORACIC VASCULAR SURGERY)
Payer: MEDICARE

## 2022-07-22 ENCOUNTER — APPOINTMENT (OUTPATIENT)
Dept: GENERAL RADIOLOGY | Facility: HOSPITAL | Age: 67
DRG: 236 | End: 2022-07-22
Attending: THORACIC SURGERY (CARDIOTHORACIC VASCULAR SURGERY)
Payer: MEDICARE

## 2022-07-22 LAB
ANION GAP SERPL CALC-SCNC: 6 MMOL/L (ref 0–18)
ATRIAL RATE: 81 BPM
BUN BLD-MCNC: 12 MG/DL (ref 7–18)
CALCIUM BLD-MCNC: 8.2 MG/DL (ref 8.5–10.1)
CHLORIDE SERPL-SCNC: 110 MMOL/L (ref 98–112)
CO2 SERPL-SCNC: 26 MMOL/L (ref 21–32)
CREAT BLD-MCNC: 0.91 MG/DL
ERYTHROCYTE [DISTWIDTH] IN BLOOD BY AUTOMATED COUNT: 14.8 %
GLUCOSE BLD-MCNC: 132 MG/DL (ref 70–99)
GLUCOSE BLD-MCNC: 141 MG/DL (ref 70–99)
GLUCOSE BLD-MCNC: 154 MG/DL (ref 70–99)
GLUCOSE BLD-MCNC: 161 MG/DL (ref 70–99)
GLUCOSE BLD-MCNC: 164 MG/DL (ref 70–99)
HCT VFR BLD AUTO: 30.2 %
HGB BLD-MCNC: 9.3 G/DL
ISTAT ACTIVATED CLOTTING TIME: 109 SECONDS (ref 74–137)
ISTAT ACTIVATED CLOTTING TIME: 463 SECONDS (ref 74–137)
ISTAT ACTIVATED CLOTTING TIME: 631 SECONDS (ref 74–137)
MCH RBC QN AUTO: 27 PG (ref 26–34)
MCHC RBC AUTO-ENTMCNC: 30.8 G/DL (ref 31–37)
MCV RBC AUTO: 87.8 FL
OSMOLALITY SERPL CALC.SUM OF ELEC: 296 MOSM/KG (ref 275–295)
P AXIS: 43 DEGREES
P-R INTERVAL: 146 MS
PLATELET # BLD AUTO: 180 10(3)UL (ref 150–450)
POTASSIUM SERPL-SCNC: 4.1 MMOL/L (ref 3.5–5.1)
Q-T INTERVAL: 420 MS
QRS DURATION: 86 MS
QTC CALCULATION (BEZET): 487 MS
R AXIS: 1 DEGREES
RBC # BLD AUTO: 3.44 X10(6)UL
SODIUM SERPL-SCNC: 142 MMOL/L (ref 136–145)
T AXIS: 53 DEGREES
VENTRICULAR RATE: 81 BPM
WBC # BLD AUTO: 10.8 X10(3) UL (ref 4–11)

## 2022-07-22 PROCEDURE — 80048 BASIC METABOLIC PNL TOTAL CA: CPT | Performed by: PHYSICIAN ASSISTANT

## 2022-07-22 PROCEDURE — 82962 GLUCOSE BLOOD TEST: CPT

## 2022-07-22 PROCEDURE — 71045 X-RAY EXAM CHEST 1 VIEW: CPT | Performed by: THORACIC SURGERY (CARDIOTHORACIC VASCULAR SURGERY)

## 2022-07-22 PROCEDURE — 99211 OFF/OP EST MAY X REQ PHY/QHP: CPT

## 2022-07-22 PROCEDURE — 85027 COMPLETE CBC AUTOMATED: CPT | Performed by: PHYSICIAN ASSISTANT

## 2022-07-22 RX ORDER — FUROSEMIDE 10 MG/ML
20 INJECTION INTRAMUSCULAR; INTRAVENOUS DAILY
Status: DISCONTINUED | OUTPATIENT
Start: 2022-07-22 | End: 2022-07-25

## 2022-07-22 NOTE — PLAN OF CARE
Assumed care of pt. At [address] [facility]. A & O x4. VSS - remains on nasal cannula. IS encouraged. YOUNG. Cordis, CT, Bustillos, V-wires remain in place. Pt. C/o moderate to severe pain, relieved w/ Dilaudid PCA & PRN [place]. Pt. Very anxious, PRN Valium given. Ambulating as tolerated. Will continue to monitor closely.

## 2022-07-22 NOTE — PLAN OF CARE
Assumed patient care at 0730. Patient sitting up in chair, alert/oriented. Nasal cannula on, weaning down O2 as tolerated, denies SOB. Per MD, chest tubes clamped for 2hrs, repeat chest xray stable,  orders received to remove chest tubes. Monitor shows sinus rhythm. BP stable. Pacing wires secured to dressing. Tolerating diet. Bustillos removed, up to bathroom to void. Patient stated pain improved after chest tubes removed, dilaudid pca d/c'd per order. Ambulated miles with minimal assist & walker. Plan of care updated with patient, all questions answered.

## 2022-07-23 LAB
ANION GAP SERPL CALC-SCNC: 3 MMOL/L (ref 0–18)
BLOOD TYPE BARCODE: 600
BUN BLD-MCNC: 8 MG/DL (ref 7–18)
CALCIUM BLD-MCNC: 8.3 MG/DL (ref 8.5–10.1)
CHLORIDE SERPL-SCNC: 110 MMOL/L (ref 98–112)
CO2 SERPL-SCNC: 28 MMOL/L (ref 21–32)
CREAT BLD-MCNC: 0.74 MG/DL
ERYTHROCYTE [DISTWIDTH] IN BLOOD BY AUTOMATED COUNT: 14.2 %
GLUCOSE BLD-MCNC: 105 MG/DL (ref 70–99)
GLUCOSE BLD-MCNC: 122 MG/DL (ref 70–99)
GLUCOSE BLD-MCNC: 123 MG/DL (ref 70–99)
GLUCOSE BLD-MCNC: 126 MG/DL (ref 70–99)
GLUCOSE BLD-MCNC: 149 MG/DL (ref 70–99)
HCT VFR BLD AUTO: 27.4 %
HGB BLD-MCNC: 8.8 G/DL
MCH RBC QN AUTO: 27.7 PG (ref 26–34)
MCHC RBC AUTO-ENTMCNC: 32.1 G/DL (ref 31–37)
MCV RBC AUTO: 86.2 FL
OSMOLALITY SERPL CALC.SUM OF ELEC: 292 MOSM/KG (ref 275–295)
PLATELET # BLD AUTO: 168 10(3)UL (ref 150–450)
POTASSIUM SERPL-SCNC: 3.6 MMOL/L (ref 3.5–5.1)
POTASSIUM SERPL-SCNC: 3.8 MMOL/L (ref 3.5–5.1)
RBC # BLD AUTO: 3.18 X10(6)UL
SODIUM SERPL-SCNC: 141 MMOL/L (ref 136–145)
WBC # BLD AUTO: 7.9 X10(3) UL (ref 4–11)

## 2022-07-23 PROCEDURE — 82962 GLUCOSE BLOOD TEST: CPT

## 2022-07-23 PROCEDURE — 97116 GAIT TRAINING THERAPY: CPT

## 2022-07-23 PROCEDURE — 85027 COMPLETE CBC AUTOMATED: CPT | Performed by: PHYSICIAN ASSISTANT

## 2022-07-23 PROCEDURE — 97110 THERAPEUTIC EXERCISES: CPT

## 2022-07-23 PROCEDURE — 80048 BASIC METABOLIC PNL TOTAL CA: CPT | Performed by: PHYSICIAN ASSISTANT

## 2022-07-23 PROCEDURE — 84132 ASSAY OF SERUM POTASSIUM: CPT | Performed by: INTERNAL MEDICINE

## 2022-07-23 RX ORDER — POTASSIUM CHLORIDE 20 MEQ/1
40 TABLET, EXTENDED RELEASE ORAL EVERY 4 HOURS
Status: COMPLETED | OUTPATIENT
Start: 2022-07-23 | End: 2022-07-23

## 2022-07-23 NOTE — PLAN OF CARE
Assumed patient care at 0730. Patient sitting up in chair, alert/oriented. O2 weaned off. Continuing use of IS. VSS. Pacing wires removed per order. Plan to restart xarelto tomorrow. Tolerating diet. Voids. Patient complains of sternal incision pain, controlled with prn norco. Ambulated halls x3 with standby assist and walker. Transfer orders, received room 7087, report given to [address], patient belongings sent with.

## 2022-07-23 NOTE — PLAN OF CARE
Assumed care of pt. At [address]. Pt. Up in chair. A & O x4. Remains anxious at times. VSS. C/o severe pain at times, relieved w/ PRN medication. Pacer wires taped. YOUNG w/ some generalized weakness. Ambulating as tolerated. Will continue to monitor closely.

## 2022-07-23 NOTE — PLAN OF CARE
1640 [de-identified] Pt transferred from the 6th floor . Alert and oriented able to make needs known. C/o of SOB saturation 88-89 put on 2 L NC . States her  pain is controlled at this time . 1730: weanead pt off to 1L of oxygen. Saturation in the  Mid 90s. Doing  Incentive spirometer up to 1000.

## 2022-07-24 LAB
ANION GAP SERPL CALC-SCNC: 6 MMOL/L (ref 0–18)
BASOPHILS # BLD AUTO: 0.05 X10(3) UL (ref 0–0.2)
BASOPHILS NFR BLD AUTO: 0.6 %
BUN BLD-MCNC: 11 MG/DL (ref 7–18)
CALCIUM BLD-MCNC: 8.6 MG/DL (ref 8.5–10.1)
CHLORIDE SERPL-SCNC: 110 MMOL/L (ref 98–112)
CO2 SERPL-SCNC: 26 MMOL/L (ref 21–32)
CREAT BLD-MCNC: 0.68 MG/DL
EOSINOPHIL # BLD AUTO: 0.17 X10(3) UL (ref 0–0.7)
EOSINOPHIL NFR BLD AUTO: 2.2 %
ERYTHROCYTE [DISTWIDTH] IN BLOOD BY AUTOMATED COUNT: 14.4 %
GLUCOSE BLD-MCNC: 112 MG/DL (ref 70–99)
GLUCOSE BLD-MCNC: 116 MG/DL (ref 70–99)
GLUCOSE BLD-MCNC: 123 MG/DL (ref 70–99)
GLUCOSE BLD-MCNC: 127 MG/DL (ref 70–99)
GLUCOSE BLD-MCNC: 97 MG/DL (ref 70–99)
HCT VFR BLD AUTO: 31.8 %
HGB BLD-MCNC: 9.8 G/DL
IMM GRANULOCYTES # BLD AUTO: 0.03 X10(3) UL (ref 0–1)
IMM GRANULOCYTES NFR BLD: 0.4 %
LYMPHOCYTES # BLD AUTO: 1.5 X10(3) UL (ref 1–4)
LYMPHOCYTES NFR BLD AUTO: 19.4 %
MCH RBC QN AUTO: 27.1 PG (ref 26–34)
MCHC RBC AUTO-ENTMCNC: 30.8 G/DL (ref 31–37)
MCV RBC AUTO: 87.8 FL
MONOCYTES # BLD AUTO: 0.39 X10(3) UL (ref 0.1–1)
MONOCYTES NFR BLD AUTO: 5 %
NEUTROPHILS # BLD AUTO: 5.6 X10 (3) UL (ref 1.5–7.7)
NEUTROPHILS # BLD AUTO: 5.6 X10(3) UL (ref 1.5–7.7)
NEUTROPHILS NFR BLD AUTO: 72.4 %
OSMOLALITY SERPL CALC.SUM OF ELEC: 294 MOSM/KG (ref 275–295)
PLATELET # BLD AUTO: 250 10(3)UL (ref 150–450)
POTASSIUM SERPL-SCNC: 3.6 MMOL/L (ref 3.5–5.1)
POTASSIUM SERPL-SCNC: 4.4 MMOL/L (ref 3.5–5.1)
RBC # BLD AUTO: 3.62 X10(6)UL
SODIUM SERPL-SCNC: 142 MMOL/L (ref 136–145)
WBC # BLD AUTO: 7.7 X10(3) UL (ref 4–11)

## 2022-07-24 PROCEDURE — 85025 COMPLETE CBC W/AUTO DIFF WBC: CPT | Performed by: THORACIC SURGERY (CARDIOTHORACIC VASCULAR SURGERY)

## 2022-07-24 PROCEDURE — 84132 ASSAY OF SERUM POTASSIUM: CPT | Performed by: INTERNAL MEDICINE

## 2022-07-24 PROCEDURE — 80048 BASIC METABOLIC PNL TOTAL CA: CPT | Performed by: THORACIC SURGERY (CARDIOTHORACIC VASCULAR SURGERY)

## 2022-07-24 PROCEDURE — 82962 GLUCOSE BLOOD TEST: CPT

## 2022-07-24 RX ORDER — POTASSIUM CHLORIDE 20 MEQ/1
40 TABLET, EXTENDED RELEASE ORAL EVERY 4 HOURS
Status: COMPLETED | OUTPATIENT
Start: 2022-07-24 | End: 2022-07-24

## 2022-07-24 NOTE — PROGRESS NOTES
07/23/22 1915   Mobility   Activity Ambulate in miles   Level of Assistance Standby assist, set-up cues, supervision of patient - no hands on   Assistive Device Front wheel walker   Distance (ft) 150   Ambulation Response Tolerated fairly well   SPO2% on Room Air at Rest 98   SPO2% on Oxygen at Rest 0   SPO2% Ambulation on Room Air 89

## 2022-07-24 NOTE — PROGRESS NOTES
Noted redness around pt donor site on left thigh , area slightly warm to touch, no drainage noted . Dr Jimena James was called and made aware with no new orders.

## 2022-07-24 NOTE — PLAN OF CARE
Pt a/ox4. On 2L O2. Pulse oximetry not reading well. Feels short of breath off oxygen. Feels comfortable on 2L. NSR. Voiding. Up with sba and walker. Pain in sternal incision. Norco given prn. Scds on overnight. All needs met      Problem: CARDIOVASCULAR - ADULT  Goal: Maintains optimal cardiac output and hemodynamic stability  Description: INTERVENTIONS:  - Monitor vital signs, rhythm, and trends  - Monitor for bleeding, hypotension and signs of decreased cardiac output  - Evaluate effectiveness of vasoactive medications to optimize hemodynamic stability  - Monitor arterial and/or venous puncture sites for bleeding and/or hematoma  - Assess quality of pulses, skin color and temperature  - Assess for signs of decreased coronary artery perfusion - ex.  Angina  - Evaluate fluid balance, assess for edema, trend weights  Outcome: Progressing  Goal: Absence of cardiac arrhythmias or at baseline  Description: INTERVENTIONS:  - Continuous cardiac monitoring, monitor vital signs, obtain 12 lead EKG if indicated  - Evaluate effectiveness of antiarrhythmic and heart rate control medications as ordered  - Initiate emergency measures for life threatening arrhythmias  - Monitor electrolytes and administer replacement therapy as ordered  Outcome: Progressing     Problem: RESPIRATORY - ADULT  Goal: Achieves optimal ventilation and oxygenation  Description: INTERVENTIONS:  - Assess for changes in respiratory status  - Assess for changes in mentation and behavior  - Position to facilitate oxygenation and minimize respiratory effort  - Oxygen supplementation based on oxygen saturation or ABGs  - Provide Smoking Cessation handout, if applicable  - Encourage broncho-pulmonary hygiene including cough, deep breathe, Incentive Spirometry  - Assess the need for suctioning and perform as needed  - Assess and instruct to report SOB or any respiratory difficulty  - Respiratory Therapy support as indicated  - Manage/alleviate anxiety  - Monitor for signs/symptoms of CO2 retention  Outcome: Progressing     Problem: Impaired Functional Mobility  Goal: Achieve highest/safest level of mobility/gait  Description: Interventions:  - Assess patient's functional ability and stability  - Promote increasing activity/tolerance for mobility and gait  - Educate and engage patient/family in tolerated activity level and precautions    Outcome: Progressing

## 2022-07-24 NOTE — PLAN OF CARE
Pt alert able to make needs known. No complains  at this time up in the hallways ambulating x2 this morning and tolerated well. Cont on room air with no complains of SOB. POC discussed with pt, verbalize understanding. Call light within reach. Problem: CARDIOVASCULAR - ADULT  Goal: Maintains optimal cardiac output and hemodynamic stability  Description: INTERVENTIONS:  - Monitor vital signs, rhythm, and trends  - Monitor for bleeding, hypotension and signs of decreased cardiac output  - Evaluate effectiveness of vasoactive medications to optimize hemodynamic stability  - Monitor arterial and/or venous puncture sites for bleeding and/or hematoma  - Assess quality of pulses, skin color and temperature  - Assess for signs of decreased coronary artery perfusion - ex.  Angina  - Evaluate fluid balance, assess for edema, trend weights  Outcome: Progressing  Goal: Absence of cardiac arrhythmias or at baseline  Description: INTERVENTIONS:  - Continuous cardiac monitoring, monitor vital signs, obtain 12 lead EKG if indicated  - Evaluate effectiveness of antiarrhythmic and heart rate control medications as ordered  - Initiate emergency measures for life threatening arrhythmias  - Monitor electrolytes and administer replacement therapy as ordered  Outcome: Progressing     Problem: RESPIRATORY - ADULT  Goal: Achieves optimal ventilation and oxygenation  Description: INTERVENTIONS:  - Assess for changes in respiratory status  - Assess for changes in mentation and behavior  - Position to facilitate oxygenation and minimize respiratory effort  - Oxygen supplementation based on oxygen saturation or ABGs  - Provide Smoking Cessation handout, if applicable  - Encourage broncho-pulmonary hygiene including cough, deep breathe, Incentive Spirometry  - Assess the need for suctioning and perform as needed  - Assess and instruct to report SOB or any respiratory difficulty  - Respiratory Therapy support as indicated  - Manage/alleviate anxiety  - Monitor for signs/symptoms of CO2 retention  Outcome: Progressing     Problem: Impaired Functional Mobility  Goal: Achieve highest/safest level of mobility/gait  Description: Interventions:  - Assess patient's functional ability and stability  - Promote increasing activity/tolerance for mobility and gait  - Educate and engage patient/family in tolerated activity level and precautions    Outcome: Progressing

## 2022-07-25 VITALS
BODY MASS INDEX: 31.85 KG/M2 | WEIGHT: 191.13 LBS | OXYGEN SATURATION: 100 % | HEART RATE: 88 BPM | SYSTOLIC BLOOD PRESSURE: 138 MMHG | HEIGHT: 65 IN | TEMPERATURE: 98 F | DIASTOLIC BLOOD PRESSURE: 68 MMHG | RESPIRATION RATE: 18 BRPM

## 2022-07-25 LAB
GLUCOSE BLD-MCNC: 116 MG/DL (ref 70–99)
GLUCOSE BLD-MCNC: 129 MG/DL (ref 70–99)

## 2022-07-25 PROCEDURE — 82962 GLUCOSE BLOOD TEST: CPT

## 2022-07-25 RX ORDER — FUROSEMIDE 20 MG/1
10 TABLET ORAL DAILY
Qty: 15 TABLET | Refills: 0 | Status: SHIPPED | OUTPATIENT
Start: 2022-07-25

## 2022-07-25 RX ORDER — DIAZEPAM 5 MG/1
5 TABLET ORAL NIGHTLY PRN
Qty: 30 TABLET | Refills: 0 | Status: SHIPPED | OUTPATIENT
Start: 2022-07-25

## 2022-07-25 RX ORDER — CEPHALEXIN 500 MG/1
500 CAPSULE ORAL EVERY 6 HOURS SCHEDULED
Qty: 24 CAPSULE | Refills: 0 | Status: SHIPPED | OUTPATIENT
Start: 2022-07-25 | End: 2022-07-31

## 2022-07-25 RX ORDER — CEPHALEXIN 500 MG/1
500 CAPSULE ORAL EVERY 6 HOURS SCHEDULED
Status: DISCONTINUED | OUTPATIENT
Start: 2022-07-25 | End: 2022-07-25

## 2022-07-25 RX ORDER — HYDROCODONE BITARTRATE AND ACETAMINOPHEN 5; 325 MG/1; MG/1
1-2 TABLET ORAL EVERY 6 HOURS PRN
Qty: 60 TABLET | Refills: 0 | Status: SHIPPED | OUTPATIENT
Start: 2022-07-25 | End: 2022-08-05

## 2022-07-25 NOTE — CM/SW NOTE
07/25/22 1500   Discharge disposition   Expected discharge disposition Home or [facility]     AVS uploaded to Abrahan Valverde in [address].     [facility] Ming Munoz  P: [phone]  F: [phone]

## 2022-07-25 NOTE — PROGRESS NOTES
Patient discharged to home. Discharge instructions given to and understood by patient. Appointment reviewed, all questions answered. Patient left unit with all belongings with no sign of distress noted.

## 2022-07-25 NOTE — PLAN OF CARE
Assumed care of patient 1930 resting in bed. AO x4. NSR on tele monitor. O2 sat adequate on room air. Denies chest pain and shortness of breath. Mid sternal incision CDI with steri strips intact. Left leg incisions x2 with slight redness, no drainage. Steri strips intact. Plan of care updated. Bed locked, in lowest position, with bed alarm on. Call light and personal items in reach. Will continue to monitor. Problem: CARDIOVASCULAR - ADULT  Goal: Maintains optimal cardiac output and hemodynamic stability  Description: INTERVENTIONS:  - Monitor vital signs, rhythm, and trends  - Monitor for bleeding, hypotension and signs of decreased cardiac output  - Evaluate effectiveness of vasoactive medications to optimize hemodynamic stability  - Monitor arterial and/or venous puncture sites for bleeding and/or hematoma  - Assess quality of pulses, skin color and temperature  - Assess for signs of decreased coronary artery perfusion - ex.  Angina  - Evaluate fluid balance, assess for edema, trend weights  Outcome: Progressing  Goal: Absence of cardiac arrhythmias or at baseline  Description: INTERVENTIONS:  - Continuous cardiac monitoring, monitor vital signs, obtain 12 lead EKG if indicated  - Evaluate effectiveness of antiarrhythmic and heart rate control medications as ordered  - Initiate emergency measures for life threatening arrhythmias  - Monitor electrolytes and administer replacement therapy as ordered  Outcome: Progressing     Problem: RESPIRATORY - ADULT  Goal: Achieves optimal ventilation and oxygenation  Description: INTERVENTIONS:  - Assess for changes in respiratory status  - Assess for changes in mentation and behavior  - Position to facilitate oxygenation and minimize respiratory effort  - Oxygen supplementation based on oxygen saturation or ABGs  - Provide Smoking Cessation handout, if applicable  - Encourage broncho-pulmonary hygiene including cough, deep breathe, Incentive Spirometry  - Assess the need for suctioning and perform as needed  - Assess and instruct to report SOB or any respiratory difficulty  - Respiratory Therapy support as indicated  - Manage/alleviate anxiety  - Monitor for signs/symptoms of CO2 retention  Outcome: Progressing     Problem: Impaired Functional Mobility  Goal: Achieve highest/safest level of mobility/gait  Description: Interventions:  - Assess patient's functional ability and stability  - Promote increasing activity/tolerance for mobility and gait  - Educate and engage patient/family in tolerated activity level and precautions  Outcome: Progressing

## 2022-07-25 NOTE — PLAN OF CARE
Patient alert and oriented x 4. Up  On standby. On RA. NSR on tele. Continent of bowel and bladder. No complaints of pain, shortness of breath or chest pain/discomfort. Sternal incisions intact and treated with Betadine. POC : oral antibiotics. Fall precautions in place. Call light within reach. Problem: CARDIOVASCULAR - ADULT  Goal: Maintains optimal cardiac output and hemodynamic stability  Description: INTERVENTIONS:  - Monitor vital signs, rhythm, and trends  - Monitor for bleeding, hypotension and signs of decreased cardiac output  - Evaluate effectiveness of vasoactive medications to optimize hemodynamic stability  - Monitor arterial and/or venous puncture sites for bleeding and/or hematoma  - Assess quality of pulses, skin color and temperature  - Assess for signs of decreased coronary artery perfusion - ex.  Angina  - Evaluate fluid balance, assess for edema, trend weights  Outcome: Progressing  Goal: Absence of cardiac arrhythmias or at baseline  Description: INTERVENTIONS:  - Continuous cardiac monitoring, monitor vital signs, obtain 12 lead EKG if indicated  - Evaluate effectiveness of antiarrhythmic and heart rate control medications as ordered  - Initiate emergency measures for life threatening arrhythmias  - Monitor electrolytes and administer replacement therapy as ordered  Outcome: Progressing     Problem: RESPIRATORY - ADULT  Goal: Achieves optimal ventilation and oxygenation  Description: INTERVENTIONS:  - Assess for changes in respiratory status  - Assess for changes in mentation and behavior  - Position to facilitate oxygenation and minimize respiratory effort  - Oxygen supplementation based on oxygen saturation or ABGs  - Provide Smoking Cessation handout, if applicable  - Encourage broncho-pulmonary hygiene including cough, deep breathe, Incentive Spirometry  - Assess the need for suctioning and perform as needed  - Assess and instruct to report SOB or any respiratory difficulty  - Respiratory Therapy support as indicated  - Manage/alleviate anxiety  - Monitor for signs/symptoms of CO2 retention  Outcome: Progressing     Problem: Impaired Functional Mobility  Goal: Achieve highest/safest level of mobility/gait  Description: Interventions:  - Assess patient's functional ability and stability  - Promote increasing activity/tolerance for mobility and gait  - Educate and engage patient/family in tolerated activity level and precautions  - When transferring patient, block weaker knee for safety  Outcome: Progressing

## 2022-07-25 NOTE — CM/SW NOTE
ERIN sent Op report and updates to Abrahan Valverde in [address] [facility].     [facility] Ming Munoz  P: [phone]  F: [phone], [facility]  Discharge [address] [facility].

## 2022-07-27 ENCOUNTER — TELEPHONE (OUTPATIENT)
Dept: CARDIOLOGY UNIT | Facility: HOSPITAL | Age: 67
End: 2022-07-27

## 2022-07-27 NOTE — PROGRESS NOTES
Follow Up Phone Call    1. How are you doing now that you are home? C/O donor sites infected. taking abx since hospital stay. look the same at home. [facility] agrees. low energy. SOB- having in hospital as well. Yesterday SPO2 in 80s w [facility]. Instructed pt to check her o2 sat and when to call MD office. 2. Have there been any changes in your wound/incision since going home? Hot to touch- donor sites. Same complaint as hospital.     3. Is your pain manageable at home? No- not yesterday. Feeling anxious referring to as Tory Thomas". Taking 2 tabs q 6 hrs. Instructed on signs of constipation. 4. Are you following the walking routine given to you in the hospital? Yes    5. Are you continuing to use your incentive spirometer? Yes      6. Do you have your appointments? CXR: 8/3 1030  Nico 8/3 1030; 8/16 1030  Cards: 8/3 0930  PCP: 8/3 in afternoon  Cardiac rehab: 8/30: 1330  Unable to discuss w pt, see below. 7. Do you have any other questions or concerns today? MARK. Phone call terminated while asking pt about follow up appts. Pt states \"I'm tired of this shit\". This RN explained to pt that this phone call was no longer healthy dt that comment. This RN left a VM for Keshav Raza regarding pt concerns.          Wang Mesa RN  7/27/2022  5:44 PM

## 2022-08-02 ENCOUNTER — APPOINTMENT (OUTPATIENT)
Dept: GENERAL RADIOLOGY | Facility: HOSPITAL | Age: 67
End: 2022-08-02
Attending: EMERGENCY MEDICINE
Payer: MEDICARE

## 2022-08-02 ENCOUNTER — HOSPITAL ENCOUNTER (EMERGENCY)
Facility: HOSPITAL | Age: 67
Discharge: HOME | End: 2022-08-02
Attending: EMERGENCY MEDICINE
Payer: MEDICARE

## 2022-08-02 VITALS
RESPIRATION RATE: 15 BRPM | HEART RATE: 74 BPM | OXYGEN SATURATION: 96 % | DIASTOLIC BLOOD PRESSURE: 71 MMHG | SYSTOLIC BLOOD PRESSURE: 104 MMHG

## 2022-08-02 DIAGNOSIS — R07.89 CHEST PAIN, MUSCULOSKELETAL: Primary | ICD-10-CM

## 2022-08-02 DIAGNOSIS — G89.18 POST-OPERATIVE PAIN: ICD-10-CM

## 2022-08-02 LAB
ALBUMIN SERPL-MCNC: 3.4 G/DL (ref 3.4–5)
ALBUMIN/GLOB SERPL: 0.8 {RATIO} (ref 1–2)
ALP LIVER SERPL-CCNC: 112 U/L
ALT SERPL-CCNC: 20 U/L
ANION GAP SERPL CALC-SCNC: 7 MMOL/L (ref 0–18)
AST SERPL-CCNC: 54 U/L (ref 15–37)
BASOPHILS # BLD AUTO: 0.06 X10(3) UL (ref 0–0.2)
BASOPHILS NFR BLD AUTO: 0.8 %
BILIRUB SERPL-MCNC: 0.5 MG/DL (ref 0.1–2)
BUN BLD-MCNC: 13 MG/DL (ref 7–18)
CALCIUM BLD-MCNC: 9.2 MG/DL (ref 8.5–10.1)
CHLORIDE SERPL-SCNC: 109 MMOL/L (ref 98–112)
CO2 SERPL-SCNC: 23 MMOL/L (ref 21–32)
CREAT BLD-MCNC: 1.08 MG/DL
EOSINOPHIL # BLD AUTO: 0.43 X10(3) UL (ref 0–0.7)
EOSINOPHIL NFR BLD AUTO: 5.6 %
ERYTHROCYTE [DISTWIDTH] IN BLOOD BY AUTOMATED COUNT: 14.3 %
GFR SERPLBLD BASED ON 1.73 SQ M-ARVRAT: 56 ML/MIN/1.73M2 (ref 60–?)
GLOBULIN PLAS-MCNC: 4.2 G/DL (ref 2.8–4.4)
GLUCOSE BLD-MCNC: 110 MG/DL (ref 70–99)
HCT VFR BLD AUTO: 34.6 %
HGB BLD-MCNC: 10.8 G/DL
IMM GRANULOCYTES # BLD AUTO: 0.03 X10(3) UL (ref 0–1)
IMM GRANULOCYTES NFR BLD: 0.4 %
LYMPHOCYTES # BLD AUTO: 1.67 X10(3) UL (ref 1–4)
LYMPHOCYTES NFR BLD AUTO: 21.7 %
MCH RBC QN AUTO: 26.9 PG (ref 26–34)
MCHC RBC AUTO-ENTMCNC: 31.2 G/DL (ref 31–37)
MCV RBC AUTO: 86.3 FL
MONOCYTES # BLD AUTO: 0.41 X10(3) UL (ref 0.1–1)
MONOCYTES NFR BLD AUTO: 5.3 %
NEUTROPHILS # BLD AUTO: 5.1 X10 (3) UL (ref 1.5–7.7)
NEUTROPHILS # BLD AUTO: 5.1 X10(3) UL (ref 1.5–7.7)
NEUTROPHILS NFR BLD AUTO: 66.2 %
NT-PROBNP SERPL-MCNC: 1571 PG/ML (ref ?–125)
OSMOLALITY SERPL CALC.SUM OF ELEC: 289 MOSM/KG (ref 275–295)
PLATELET # BLD AUTO: 367 10(3)UL (ref 150–450)
POTASSIUM SERPL-SCNC: 5.1 MMOL/L (ref 3.5–5.1)
PROT SERPL-MCNC: 7.6 G/DL (ref 6.4–8.2)
RBC # BLD AUTO: 4.01 X10(6)UL
SARS-COV-2 RNA RESP QL NAA+PROBE: NOT DETECTED
SODIUM SERPL-SCNC: 139 MMOL/L (ref 136–145)
TROPONIN I HIGH SENSITIVITY: 27 NG/L
WBC # BLD AUTO: 7.7 X10(3) UL (ref 4–11)

## 2022-08-02 PROCEDURE — 96374 THER/PROPH/DIAG INJ IV PUSH: CPT

## 2022-08-02 PROCEDURE — 99285 EMERGENCY DEPT VISIT HI MDM: CPT

## 2022-08-02 PROCEDURE — 85025 COMPLETE CBC W/AUTO DIFF WBC: CPT | Performed by: EMERGENCY MEDICINE

## 2022-08-02 PROCEDURE — 83880 ASSAY OF NATRIURETIC PEPTIDE: CPT | Performed by: EMERGENCY MEDICINE

## 2022-08-02 PROCEDURE — 71045 X-RAY EXAM CHEST 1 VIEW: CPT | Performed by: EMERGENCY MEDICINE

## 2022-08-02 PROCEDURE — 93010 ELECTROCARDIOGRAM REPORT: CPT

## 2022-08-02 PROCEDURE — 84484 ASSAY OF TROPONIN QUANT: CPT | Performed by: EMERGENCY MEDICINE

## 2022-08-02 PROCEDURE — 80053 COMPREHEN METABOLIC PANEL: CPT | Performed by: EMERGENCY MEDICINE

## 2022-08-02 PROCEDURE — 93005 ELECTROCARDIOGRAM TRACING: CPT

## 2022-08-02 PROCEDURE — 99284 EMERGENCY DEPT VISIT MOD MDM: CPT

## 2022-08-02 RX ORDER — HYDROCODONE BITARTRATE AND ACETAMINOPHEN 10; 325 MG/1; MG/1
1-2 TABLET ORAL EVERY 6 HOURS PRN
Qty: 10 TABLET | Refills: 0 | Status: SHIPPED | OUTPATIENT
Start: 2022-08-02 | End: 2022-08-05

## 2022-08-02 RX ORDER — HYDROMORPHONE HYDROCHLORIDE 1 MG/ML
0.5 INJECTION, SOLUTION INTRAMUSCULAR; INTRAVENOUS; SUBCUTANEOUS ONCE
Status: COMPLETED | OUTPATIENT
Start: 2022-08-02 | End: 2022-08-02

## 2022-08-02 RX ORDER — DIAZEPAM 5 MG/1
5 TABLET ORAL ONCE
Status: COMPLETED | OUTPATIENT
Start: 2022-08-02 | End: 2022-08-02

## 2022-08-02 NOTE — ED INITIAL ASSESSMENT (HPI)
Pt arrives to ED from home with complaints of feeling SOB and a \"stabbing intermittent chest pain\" that started last night. Pt with hx of double bypass surgery on 7/20/22.

## 2022-08-03 ENCOUNTER — HOSPITAL ENCOUNTER (OUTPATIENT)
Dept: GENERAL RADIOLOGY | Facility: HOSPITAL | Age: 67
Discharge: HOME | End: 2022-08-03
Attending: THORACIC SURGERY (CARDIOTHORACIC VASCULAR SURGERY)
Payer: MEDICARE

## 2022-08-03 DIAGNOSIS — J90 PLEURAL EFFUSION: ICD-10-CM

## 2022-08-03 PROCEDURE — 71048 X-RAY EXAM CHEST 4+ VIEWS: CPT | Performed by: THORACIC SURGERY (CARDIOTHORACIC VASCULAR SURGERY)

## 2022-08-04 LAB
ATRIAL RATE: 82 BPM
P AXIS: 61 DEGREES
P-R INTERVAL: 148 MS
Q-T INTERVAL: 408 MS
QRS DURATION: 82 MS
QTC CALCULATION (BEZET): 476 MS
R AXIS: 1 DEGREES
T AXIS: 109 DEGREES
VENTRICULAR RATE: 82 BPM

## 2022-08-09 NOTE — CDS QUERY
Uncertain Diagnosis  [facility]  Dear Dr. Nasrin Recio,  Clinical information (provided below) indicates a documented diagnosis of cellulitis. For accurate ICD-10-CM code assignment,   PLEASE clarify the diagnosis of cellulitis     [   ] Cellulitis to harvest site ruled in   [   ] Cellulitis to harvest site ruled out   [   ] Other (please specify):   [   ] Clinically unable to determine    Documentation from the Medical Record:   Risk factors: Recent surgery  Clinical indicators: SVG harvest sites x2 with janeen-incisional erythema, no drainage. afebrile. Treatment: Po antibiotic at discharge  7/25 Hospitalist PN: erythema around graft site  - concerning for early cellulitis (vs reaction to tape/dressing)  - error on side of caution and rx PO kelfex X 1 week  Cardiothoracic Surgery PN :   - ?cellutlitis - agree with course of Keflex     For questions regarding this query, please contact Clinical :   Florencia Coley RN, BSN, Mai [address] Gowers. [email or web address]    Thank you,  Thedford Gowers                                                           THIS FORM IS A PERMANENT PART OF THE MEDICAL RECORD

## 2022-08-19 ENCOUNTER — HOSPITAL ENCOUNTER (OUTPATIENT)
Dept: GENERAL RADIOLOGY | Facility: HOSPITAL | Age: 67
Discharge: HOME | End: 2022-08-19
Attending: THORACIC SURGERY (CARDIOTHORACIC VASCULAR SURGERY)
Payer: MEDICARE

## 2022-08-19 DIAGNOSIS — J90 PLEURAL EFFUSION: ICD-10-CM

## 2022-08-19 PROCEDURE — 71048 X-RAY EXAM CHEST 4+ VIEWS: CPT | Performed by: THORACIC SURGERY (CARDIOTHORACIC VASCULAR SURGERY)

## 2022-08-26 ENCOUNTER — ORDER TRANSCRIPTION (OUTPATIENT)
Dept: CARDIAC REHAB | Facility: HOSPITAL | Age: 67
End: 2022-08-26

## 2022-08-26 DIAGNOSIS — Z95.1 S/P CABG (CORONARY ARTERY BYPASS GRAFT): Primary | ICD-10-CM

## 2022-08-30 ENCOUNTER — CARDPULM VISIT (OUTPATIENT)
Dept: CARDIAC REHAB | Facility: HOSPITAL | Age: 67
End: 2022-08-30
Attending: INTERNAL MEDICINE
Payer: MEDICARE

## 2022-08-30 VITALS — BODY MASS INDEX: 35.93 KG/M2 | WEIGHT: 183 LBS | HEIGHT: 60 IN

## 2022-09-01 ENCOUNTER — CARDPULM VISIT (OUTPATIENT)
Dept: CARDIAC REHAB | Facility: HOSPITAL | Age: 67
End: 2022-09-01
Attending: INTERNAL MEDICINE
Payer: MEDICARE

## 2022-09-01 PROCEDURE — 93798 PHYS/QHP OP CAR RHAB W/ECG: CPT

## 2022-09-07 ENCOUNTER — CARDPULM VISIT (OUTPATIENT)
Dept: CARDIAC REHAB | Facility: HOSPITAL | Age: 67
End: 2022-09-07
Attending: INTERNAL MEDICINE
Payer: MEDICARE

## 2022-09-07 PROCEDURE — 93798 PHYS/QHP OP CAR RHAB W/ECG: CPT

## 2022-09-08 ENCOUNTER — APPOINTMENT (OUTPATIENT)
Dept: CARDIAC REHAB | Facility: HOSPITAL | Age: 67
End: 2022-09-08
Attending: INTERNAL MEDICINE
Payer: MEDICARE

## 2022-09-08 PROCEDURE — 93798 PHYS/QHP OP CAR RHAB W/ECG: CPT

## 2022-09-12 ENCOUNTER — APPOINTMENT (OUTPATIENT)
Dept: CARDIAC REHAB | Facility: HOSPITAL | Age: 67
End: 2022-09-12
Attending: INTERNAL MEDICINE
Payer: MEDICARE

## 2022-09-12 PROCEDURE — 93798 PHYS/QHP OP CAR RHAB W/ECG: CPT

## 2022-09-14 ENCOUNTER — APPOINTMENT (OUTPATIENT)
Dept: CARDIAC REHAB | Facility: HOSPITAL | Age: 67
End: 2022-09-14
Payer: MEDICARE

## 2022-09-14 ENCOUNTER — APPOINTMENT (OUTPATIENT)
Dept: CARDIAC REHAB | Facility: HOSPITAL | Age: 67
End: 2022-09-14
Attending: INTERNAL MEDICINE
Payer: MEDICARE

## 2022-09-15 ENCOUNTER — APPOINTMENT (OUTPATIENT)
Dept: CARDIAC REHAB | Facility: HOSPITAL | Age: 67
End: 2022-09-15
Attending: INTERNAL MEDICINE
Payer: MEDICARE

## 2022-09-16 ENCOUNTER — APPOINTMENT (OUTPATIENT)
Dept: CARDIAC REHAB | Facility: HOSPITAL | Age: 67
End: 2022-09-16
Payer: MEDICARE

## 2022-09-19 ENCOUNTER — CARDPULM VISIT (OUTPATIENT)
Dept: CARDIAC REHAB | Facility: HOSPITAL | Age: 67
End: 2022-09-19
Attending: INTERNAL MEDICINE
Payer: MEDICARE

## 2022-09-19 PROCEDURE — 93798 PHYS/QHP OP CAR RHAB W/ECG: CPT

## 2022-09-21 ENCOUNTER — APPOINTMENT (OUTPATIENT)
Dept: CARDIAC REHAB | Facility: HOSPITAL | Age: 67
End: 2022-09-21
Attending: INTERNAL MEDICINE
Payer: MEDICARE

## 2022-09-21 ENCOUNTER — APPOINTMENT (OUTPATIENT)
Dept: CARDIAC REHAB | Facility: HOSPITAL | Age: 67
End: 2022-09-21
Payer: MEDICARE

## 2022-09-22 ENCOUNTER — APPOINTMENT (OUTPATIENT)
Dept: CARDIAC REHAB | Facility: HOSPITAL | Age: 67
End: 2022-09-22
Attending: INTERNAL MEDICINE
Payer: MEDICARE

## 2022-09-23 ENCOUNTER — APPOINTMENT (OUTPATIENT)
Dept: CARDIAC REHAB | Facility: HOSPITAL | Age: 67
End: 2022-09-23
Payer: MEDICARE

## 2022-09-26 ENCOUNTER — APPOINTMENT (OUTPATIENT)
Dept: CARDIAC REHAB | Facility: HOSPITAL | Age: 67
End: 2022-09-26
Payer: MEDICARE

## 2022-09-26 ENCOUNTER — APPOINTMENT (OUTPATIENT)
Dept: CARDIAC REHAB | Facility: HOSPITAL | Age: 67
End: 2022-09-26
Attending: INTERNAL MEDICINE
Payer: MEDICARE

## 2022-09-28 ENCOUNTER — APPOINTMENT (OUTPATIENT)
Dept: CARDIAC REHAB | Facility: HOSPITAL | Age: 67
End: 2022-09-28
Attending: INTERNAL MEDICINE
Payer: MEDICARE

## 2022-09-28 ENCOUNTER — APPOINTMENT (OUTPATIENT)
Dept: CARDIAC REHAB | Facility: HOSPITAL | Age: 67
End: 2022-09-28
Payer: MEDICARE

## 2022-09-29 ENCOUNTER — APPOINTMENT (OUTPATIENT)
Dept: CARDIAC REHAB | Facility: HOSPITAL | Age: 67
End: 2022-09-29
Attending: INTERNAL MEDICINE
Payer: MEDICARE

## 2022-09-30 ENCOUNTER — APPOINTMENT (OUTPATIENT)
Dept: CARDIAC REHAB | Facility: HOSPITAL | Age: 67
End: 2022-09-30
Payer: MEDICARE

## 2022-10-03 ENCOUNTER — APPOINTMENT (OUTPATIENT)
Dept: CARDIAC REHAB | Facility: HOSPITAL | Age: 67
End: 2022-10-03
Attending: INTERNAL MEDICINE
Payer: MEDICARE

## 2022-10-03 ENCOUNTER — APPOINTMENT (OUTPATIENT)
Dept: CARDIAC REHAB | Facility: HOSPITAL | Age: 67
End: 2022-10-03
Payer: MEDICARE

## 2022-10-05 ENCOUNTER — APPOINTMENT (OUTPATIENT)
Dept: CARDIAC REHAB | Facility: HOSPITAL | Age: 67
End: 2022-10-05
Attending: INTERNAL MEDICINE
Payer: MEDICARE

## 2022-10-05 ENCOUNTER — APPOINTMENT (OUTPATIENT)
Dept: CARDIAC REHAB | Facility: HOSPITAL | Age: 67
End: 2022-10-05
Payer: MEDICARE

## 2022-10-06 ENCOUNTER — APPOINTMENT (OUTPATIENT)
Dept: CARDIAC REHAB | Facility: HOSPITAL | Age: 67
End: 2022-10-06
Attending: INTERNAL MEDICINE
Payer: MEDICARE

## 2022-10-07 ENCOUNTER — APPOINTMENT (OUTPATIENT)
Dept: CARDIAC REHAB | Facility: HOSPITAL | Age: 67
End: 2022-10-07
Payer: MEDICARE

## 2022-10-10 ENCOUNTER — APPOINTMENT (OUTPATIENT)
Dept: CARDIAC REHAB | Facility: HOSPITAL | Age: 67
End: 2022-10-10
Attending: INTERNAL MEDICINE
Payer: MEDICARE

## 2022-10-10 ENCOUNTER — APPOINTMENT (OUTPATIENT)
Dept: CARDIAC REHAB | Facility: HOSPITAL | Age: 67
End: 2022-10-10
Payer: MEDICARE

## 2022-10-12 ENCOUNTER — APPOINTMENT (OUTPATIENT)
Dept: CARDIAC REHAB | Facility: HOSPITAL | Age: 67
End: 2022-10-12
Payer: MEDICARE

## 2022-10-12 ENCOUNTER — APPOINTMENT (OUTPATIENT)
Dept: CARDIAC REHAB | Facility: HOSPITAL | Age: 67
End: 2022-10-12
Attending: INTERNAL MEDICINE
Payer: MEDICARE

## 2022-10-13 ENCOUNTER — APPOINTMENT (OUTPATIENT)
Dept: CARDIAC REHAB | Facility: HOSPITAL | Age: 67
End: 2022-10-13
Attending: INTERNAL MEDICINE
Payer: MEDICARE

## 2022-10-14 ENCOUNTER — APPOINTMENT (OUTPATIENT)
Dept: CARDIAC REHAB | Facility: HOSPITAL | Age: 67
End: 2022-10-14
Payer: MEDICARE

## 2022-10-17 ENCOUNTER — APPOINTMENT (OUTPATIENT)
Dept: CARDIAC REHAB | Facility: HOSPITAL | Age: 67
End: 2022-10-17
Attending: INTERNAL MEDICINE
Payer: MEDICARE

## 2022-10-17 ENCOUNTER — APPOINTMENT (OUTPATIENT)
Dept: CARDIAC REHAB | Facility: HOSPITAL | Age: 67
End: 2022-10-17
Payer: MEDICARE

## 2022-10-19 ENCOUNTER — APPOINTMENT (OUTPATIENT)
Dept: CARDIAC REHAB | Facility: HOSPITAL | Age: 67
End: 2022-10-19
Attending: INTERNAL MEDICINE
Payer: MEDICARE

## 2022-10-19 ENCOUNTER — APPOINTMENT (OUTPATIENT)
Dept: CARDIAC REHAB | Facility: HOSPITAL | Age: 67
End: 2022-10-19
Payer: MEDICARE

## 2022-10-20 ENCOUNTER — APPOINTMENT (OUTPATIENT)
Dept: CARDIAC REHAB | Facility: HOSPITAL | Age: 67
End: 2022-10-20
Attending: INTERNAL MEDICINE
Payer: MEDICARE

## 2022-10-21 ENCOUNTER — APPOINTMENT (OUTPATIENT)
Dept: CARDIAC REHAB | Facility: HOSPITAL | Age: 67
End: 2022-10-21
Payer: MEDICARE

## 2022-10-24 ENCOUNTER — APPOINTMENT (OUTPATIENT)
Dept: CARDIAC REHAB | Facility: HOSPITAL | Age: 67
End: 2022-10-24
Payer: MEDICARE

## 2022-10-24 ENCOUNTER — APPOINTMENT (OUTPATIENT)
Dept: CARDIAC REHAB | Facility: HOSPITAL | Age: 67
End: 2022-10-24
Attending: INTERNAL MEDICINE
Payer: MEDICARE

## 2022-10-26 ENCOUNTER — APPOINTMENT (OUTPATIENT)
Dept: CARDIAC REHAB | Facility: HOSPITAL | Age: 67
End: 2022-10-26
Attending: INTERNAL MEDICINE
Payer: MEDICARE

## 2022-10-26 ENCOUNTER — APPOINTMENT (OUTPATIENT)
Dept: CARDIAC REHAB | Facility: HOSPITAL | Age: 67
End: 2022-10-26
Payer: MEDICARE

## 2022-10-27 ENCOUNTER — APPOINTMENT (OUTPATIENT)
Dept: CARDIAC REHAB | Facility: HOSPITAL | Age: 67
End: 2022-10-27
Attending: INTERNAL MEDICINE
Payer: MEDICARE

## 2022-10-28 ENCOUNTER — APPOINTMENT (OUTPATIENT)
Dept: CARDIAC REHAB | Facility: HOSPITAL | Age: 67
End: 2022-10-28
Payer: MEDICARE

## 2022-10-31 ENCOUNTER — APPOINTMENT (OUTPATIENT)
Dept: CARDIAC REHAB | Facility: HOSPITAL | Age: 67
End: 2022-10-31
Attending: INTERNAL MEDICINE
Payer: MEDICARE

## 2022-10-31 ENCOUNTER — APPOINTMENT (OUTPATIENT)
Dept: CARDIAC REHAB | Facility: HOSPITAL | Age: 67
End: 2022-10-31
Payer: MEDICARE

## 2022-11-02 ENCOUNTER — APPOINTMENT (OUTPATIENT)
Dept: CARDIAC REHAB | Facility: HOSPITAL | Age: 67
End: 2022-11-02
Payer: MEDICARE

## 2022-11-02 ENCOUNTER — APPOINTMENT (OUTPATIENT)
Dept: CARDIAC REHAB | Facility: HOSPITAL | Age: 67
End: 2022-11-02
Attending: INTERNAL MEDICINE
Payer: MEDICARE

## 2022-11-03 ENCOUNTER — APPOINTMENT (OUTPATIENT)
Dept: CARDIAC REHAB | Facility: HOSPITAL | Age: 67
End: 2022-11-03
Attending: INTERNAL MEDICINE
Payer: MEDICARE

## 2022-11-04 ENCOUNTER — APPOINTMENT (OUTPATIENT)
Dept: CARDIAC REHAB | Facility: HOSPITAL | Age: 67
End: 2022-11-04
Payer: MEDICARE

## 2022-11-07 ENCOUNTER — APPOINTMENT (OUTPATIENT)
Dept: CARDIAC REHAB | Facility: HOSPITAL | Age: 67
End: 2022-11-07
Payer: MEDICARE

## 2022-11-07 ENCOUNTER — APPOINTMENT (OUTPATIENT)
Dept: CARDIAC REHAB | Facility: HOSPITAL | Age: 67
End: 2022-11-07
Attending: INTERNAL MEDICINE
Payer: MEDICARE

## 2022-11-09 ENCOUNTER — APPOINTMENT (OUTPATIENT)
Dept: CARDIAC REHAB | Facility: HOSPITAL | Age: 67
End: 2022-11-09
Payer: MEDICARE

## 2022-11-09 ENCOUNTER — APPOINTMENT (OUTPATIENT)
Dept: CARDIAC REHAB | Facility: HOSPITAL | Age: 67
End: 2022-11-09
Attending: INTERNAL MEDICINE
Payer: MEDICARE

## 2022-11-10 ENCOUNTER — APPOINTMENT (OUTPATIENT)
Dept: CARDIAC REHAB | Facility: HOSPITAL | Age: 67
End: 2022-11-10
Attending: INTERNAL MEDICINE
Payer: MEDICARE

## 2022-11-11 ENCOUNTER — APPOINTMENT (OUTPATIENT)
Dept: CARDIAC REHAB | Facility: HOSPITAL | Age: 67
End: 2022-11-11
Payer: MEDICARE

## 2022-11-14 ENCOUNTER — APPOINTMENT (OUTPATIENT)
Dept: CARDIAC REHAB | Facility: HOSPITAL | Age: 67
End: 2022-11-14
Payer: MEDICARE

## 2022-11-14 ENCOUNTER — APPOINTMENT (OUTPATIENT)
Dept: CARDIAC REHAB | Facility: HOSPITAL | Age: 67
End: 2022-11-14
Attending: INTERNAL MEDICINE
Payer: MEDICARE

## 2022-11-16 ENCOUNTER — APPOINTMENT (OUTPATIENT)
Dept: CARDIAC REHAB | Facility: HOSPITAL | Age: 67
End: 2022-11-16
Attending: INTERNAL MEDICINE
Payer: MEDICARE

## 2022-11-16 ENCOUNTER — APPOINTMENT (OUTPATIENT)
Dept: CARDIAC REHAB | Facility: HOSPITAL | Age: 67
End: 2022-11-16
Payer: MEDICARE

## 2022-11-17 ENCOUNTER — APPOINTMENT (OUTPATIENT)
Dept: CARDIAC REHAB | Facility: HOSPITAL | Age: 67
End: 2022-11-17
Attending: INTERNAL MEDICINE
Payer: MEDICARE

## 2022-11-18 ENCOUNTER — APPOINTMENT (OUTPATIENT)
Dept: CARDIAC REHAB | Facility: HOSPITAL | Age: 67
End: 2022-11-18
Payer: MEDICARE

## 2022-11-21 ENCOUNTER — APPOINTMENT (OUTPATIENT)
Dept: CARDIAC REHAB | Facility: HOSPITAL | Age: 67
End: 2022-11-21
Attending: INTERNAL MEDICINE
Payer: MEDICARE

## 2022-11-21 ENCOUNTER — APPOINTMENT (OUTPATIENT)
Dept: CARDIAC REHAB | Facility: HOSPITAL | Age: 67
End: 2022-11-21
Payer: MEDICARE

## 2022-11-23 ENCOUNTER — APPOINTMENT (OUTPATIENT)
Dept: CARDIAC REHAB | Facility: HOSPITAL | Age: 67
End: 2022-11-23
Payer: MEDICARE

## 2022-11-23 ENCOUNTER — APPOINTMENT (OUTPATIENT)
Dept: CARDIAC REHAB | Facility: HOSPITAL | Age: 67
End: 2022-11-23
Attending: INTERNAL MEDICINE
Payer: MEDICARE

## 2022-11-25 ENCOUNTER — APPOINTMENT (OUTPATIENT)
Dept: CARDIAC REHAB | Facility: HOSPITAL | Age: 67
End: 2022-11-25
Payer: MEDICARE

## 2022-12-26 ENCOUNTER — APPOINTMENT (OUTPATIENT)
Dept: ULTRASOUND IMAGING | Facility: HOSPITAL | Age: 67
End: 2022-12-26
Attending: EMERGENCY MEDICINE
Payer: MEDICARE

## 2022-12-26 ENCOUNTER — APPOINTMENT (OUTPATIENT)
Dept: GENERAL RADIOLOGY | Facility: HOSPITAL | Age: 67
End: 2022-12-26
Attending: EMERGENCY MEDICINE
Payer: MEDICARE

## 2022-12-26 ENCOUNTER — HOSPITAL ENCOUNTER (EMERGENCY)
Facility: HOSPITAL | Age: 67
Discharge: HOME | End: 2022-12-26
Attending: EMERGENCY MEDICINE
Payer: MEDICARE

## 2022-12-26 ENCOUNTER — APPOINTMENT (OUTPATIENT)
Dept: CT IMAGING | Facility: HOSPITAL | Age: 67
End: 2022-12-26
Attending: EMERGENCY MEDICINE
Payer: MEDICARE

## 2022-12-26 ENCOUNTER — OFFICE VISIT (OUTPATIENT)
Dept: FAMILY MEDICINE CLINIC | Facility: CLINIC | Age: 67
End: 2022-12-26
Payer: COMMERCIAL

## 2022-12-26 VITALS
SYSTOLIC BLOOD PRESSURE: 142 MMHG | WEIGHT: 180 LBS | TEMPERATURE: 99 F | RESPIRATION RATE: 16 BRPM | BODY MASS INDEX: 30.73 KG/M2 | DIASTOLIC BLOOD PRESSURE: 76 MMHG | OXYGEN SATURATION: 98 % | HEIGHT: 64 IN | HEART RATE: 87 BPM

## 2022-12-26 DIAGNOSIS — U07.1 COVID-19: Primary | ICD-10-CM

## 2022-12-26 DIAGNOSIS — M79.10 MYALGIA: ICD-10-CM

## 2022-12-26 DIAGNOSIS — Z02.9 ENCOUNTER FOR ADMINISTRATIVE EXAMINATIONS: Primary | ICD-10-CM

## 2022-12-26 LAB
ALBUMIN SERPL-MCNC: 3.9 G/DL (ref 3.4–5)
ALBUMIN/GLOB SERPL: 1.1 {RATIO} (ref 1–2)
ALP LIVER SERPL-CCNC: 108 U/L
ALT SERPL-CCNC: 21 U/L
ANION GAP SERPL CALC-SCNC: 4 MMOL/L (ref 0–18)
AST SERPL-CCNC: 24 U/L (ref 15–37)
BASOPHILS # BLD AUTO: 0.04 X10(3) UL (ref 0–0.2)
BASOPHILS NFR BLD AUTO: 0.6 %
BILIRUB SERPL-MCNC: 0.4 MG/DL (ref 0.1–2)
BILIRUB UR QL STRIP.AUTO: NEGATIVE
BUN BLD-MCNC: 8 MG/DL (ref 7–18)
CALCIUM BLD-MCNC: 9.2 MG/DL (ref 8.5–10.1)
CHLORIDE SERPL-SCNC: 110 MMOL/L (ref 98–112)
CLARITY UR REFRACT.AUTO: CLEAR
CO2 SERPL-SCNC: 26 MMOL/L (ref 21–32)
COLOR UR AUTO: YELLOW
CREAT BLD-MCNC: 0.86 MG/DL
EOSINOPHIL # BLD AUTO: 0.06 X10(3) UL (ref 0–0.7)
EOSINOPHIL NFR BLD AUTO: 0.9 %
ERYTHROCYTE [DISTWIDTH] IN BLOOD BY AUTOMATED COUNT: 14.6 %
ERYTHROCYTE [SEDIMENTATION RATE] IN BLOOD: 25 MM/HR
FLUAV + FLUBV RNA SPEC NAA+PROBE: NEGATIVE
FLUAV + FLUBV RNA SPEC NAA+PROBE: NEGATIVE
GFR SERPLBLD BASED ON 1.73 SQ M-ARVRAT: 74 ML/MIN/1.73M2 (ref 60–?)
GLOBULIN PLAS-MCNC: 3.7 G/DL (ref 2.8–4.4)
GLUCOSE BLD-MCNC: 90 MG/DL (ref 70–99)
GLUCOSE UR STRIP.AUTO-MCNC: NEGATIVE MG/DL
HCT VFR BLD AUTO: 38.1 %
HGB BLD-MCNC: 12.5 G/DL
IMM GRANULOCYTES # BLD AUTO: 0.02 X10(3) UL (ref 0–1)
IMM GRANULOCYTES NFR BLD: 0.3 %
KETONES UR STRIP.AUTO-MCNC: NEGATIVE MG/DL
LYMPHOCYTES # BLD AUTO: 1.1 X10(3) UL (ref 1–4)
LYMPHOCYTES NFR BLD AUTO: 17 %
MCH RBC QN AUTO: 28 PG (ref 26–34)
MCHC RBC AUTO-ENTMCNC: 32.8 G/DL (ref 31–37)
MCV RBC AUTO: 85.4 FL
MONOCYTES # BLD AUTO: 0.59 X10(3) UL (ref 0.1–1)
MONOCYTES NFR BLD AUTO: 9.1 %
NEUTROPHILS # BLD AUTO: 4.67 X10 (3) UL (ref 1.5–7.7)
NEUTROPHILS # BLD AUTO: 4.67 X10(3) UL (ref 1.5–7.7)
NEUTROPHILS NFR BLD AUTO: 72.1 %
NITRITE UR QL STRIP.AUTO: NEGATIVE
OSMOLALITY SERPL CALC.SUM OF ELEC: 288 MOSM/KG (ref 275–295)
PH UR STRIP.AUTO: 6.5 [PH] (ref 5–8)
PLATELET # BLD AUTO: 240 10(3)UL (ref 150–450)
POTASSIUM SERPL-SCNC: 3.8 MMOL/L (ref 3.5–5.1)
PROCALCITONIN SERPL-MCNC: 0.05 NG/ML (ref ?–0.16)
PROT SERPL-MCNC: 7.6 G/DL (ref 6.4–8.2)
PROT UR STRIP.AUTO-MCNC: NEGATIVE MG/DL
RBC # BLD AUTO: 4.46 X10(6)UL
RBC UR QL AUTO: NEGATIVE
RSV RNA SPEC NAA+PROBE: NEGATIVE
SARS-COV-2 RNA RESP QL NAA+PROBE: DETECTED
SODIUM SERPL-SCNC: 140 MMOL/L (ref 136–145)
SP GR UR STRIP.AUTO: 1.02 (ref 1–1.03)
UROBILINOGEN UR STRIP.AUTO-MCNC: 0.2 MG/DL
WBC # BLD AUTO: 6.5 X10(3) UL (ref 4–11)

## 2022-12-26 PROCEDURE — 94640 AIRWAY INHALATION TREATMENT: CPT

## 2022-12-26 PROCEDURE — 71045 X-RAY EXAM CHEST 1 VIEW: CPT | Performed by: EMERGENCY MEDICINE

## 2022-12-26 PROCEDURE — 0241U SARS-COV-2/FLU A AND B/RSV BY PCR (GENEXPERT): CPT | Performed by: EMERGENCY MEDICINE

## 2022-12-26 PROCEDURE — 85025 COMPLETE CBC W/AUTO DIFF WBC: CPT | Performed by: EMERGENCY MEDICINE

## 2022-12-26 PROCEDURE — 87086 URINE CULTURE/COLONY COUNT: CPT | Performed by: EMERGENCY MEDICINE

## 2022-12-26 PROCEDURE — 84145 PROCALCITONIN (PCT): CPT | Performed by: EMERGENCY MEDICINE

## 2022-12-26 PROCEDURE — 85652 RBC SED RATE AUTOMATED: CPT | Performed by: EMERGENCY MEDICINE

## 2022-12-26 PROCEDURE — 93971 EXTREMITY STUDY: CPT | Performed by: EMERGENCY MEDICINE

## 2022-12-26 PROCEDURE — 36415 COLL VENOUS BLD VENIPUNCTURE: CPT

## 2022-12-26 PROCEDURE — 87040 BLOOD CULTURE FOR BACTERIA: CPT | Performed by: EMERGENCY MEDICINE

## 2022-12-26 PROCEDURE — 81001 URINALYSIS AUTO W/SCOPE: CPT | Performed by: EMERGENCY MEDICINE

## 2022-12-26 PROCEDURE — 99285 EMERGENCY DEPT VISIT HI MDM: CPT

## 2022-12-26 PROCEDURE — 81015 MICROSCOPIC EXAM OF URINE: CPT | Performed by: EMERGENCY MEDICINE

## 2022-12-26 PROCEDURE — 74176 CT ABD & PELVIS W/O CONTRAST: CPT | Performed by: EMERGENCY MEDICINE

## 2022-12-26 PROCEDURE — 80053 COMPREHEN METABOLIC PANEL: CPT | Performed by: EMERGENCY MEDICINE

## 2022-12-26 RX ORDER — TRAMADOL HYDROCHLORIDE 50 MG/1
TABLET ORAL EVERY 6 HOURS PRN
Qty: 10 TABLET | Refills: 0 | Status: SHIPPED | OUTPATIENT
Start: 2022-12-26 | End: 2022-12-31

## 2022-12-26 RX ORDER — FEXOFENADINE HCL 180 MG/1
180 TABLET ORAL DAILY
Qty: 20 TABLET | Refills: 0 | Status: SHIPPED | OUTPATIENT
Start: 2022-12-26 | End: 2023-01-15

## 2022-12-26 RX ORDER — ALBUTEROL SULFATE 90 UG/1
8 AEROSOL, METERED RESPIRATORY (INHALATION) ONCE
Status: COMPLETED | OUTPATIENT
Start: 2022-12-26 | End: 2022-12-26

## 2022-12-26 RX ORDER — BENZONATATE 100 MG/1
100 CAPSULE ORAL ONCE
Status: COMPLETED | OUTPATIENT
Start: 2022-12-26 | End: 2022-12-26

## 2022-12-26 RX ORDER — BENZONATATE 100 MG/1
100 CAPSULE ORAL 3 TIMES DAILY PRN
Qty: 30 CAPSULE | Refills: 0 | Status: SHIPPED | OUTPATIENT
Start: 2022-12-26 | End: 2023-01-25

## 2022-12-26 RX ORDER — HYDROCODONE BITARTRATE AND ACETAMINOPHEN 5; 325 MG/1; MG/1
1 TABLET ORAL ONCE
Status: COMPLETED | OUTPATIENT
Start: 2022-12-26 | End: 2022-12-26

## 2022-12-26 NOTE — ED INITIAL ASSESSMENT (HPI)
Pt c/o runny nose, diarrhea, and nausea x 1 month. Pt also c/o mild swelling and soreness to left leg. Pt states \"my PCP stated my leg was septic and I had to come here. \" No redness noted to leg, no open wound noted. Pt also c/o fever.

## 2023-08-25 RX ORDER — BIOTIN 5 MG
1 TABLET ORAL DAILY
COMMUNITY

## 2023-08-25 RX ORDER — METOPROLOL SUCCINATE 25 MG/1
25 TABLET, EXTENDED RELEASE ORAL DAILY
COMMUNITY

## 2023-08-25 RX ORDER — FUROSEMIDE 20 MG/1
10 TABLET ORAL DAILY PRN
COMMUNITY

## 2023-08-25 NOTE — PAT NURSING NOTE
Preprocedure Instructions     Pre-Procedure Instructions: Encouraged to check in electronically via [employer]     Visitor Instructions     Adult Patients: 2 Adult Care Partners can accompany the patient day of procedure. 2 Care Partners may visit [phone] during inpatient stay     PreOp Instructions     You are scheduled for: a Cardiac Procedure     Date of Procedure: 08/29/23     Diet Instructions: Do not eat or drink anything after midnight     Medications: Take Aspirin 81 mg x 4 tablets the day of your procedure;Medications you are allowed to take can be taken with a sip of water the morning of your procedure     Do not take the following Blood Thinner(s): Take last dose of Xarelto on Saturday AM 8/26     Medications to Stop: Hold herbal supplements and vitamins on day of procedure     Other Medications: Do not take biotin or Lasix on morning of procedure    Sleep Apnea: If you have sleep apnea, please bring your mask and tubing     Skin Prep: Shower with antibacterial soap using a clean washcloth, prior to procedure, clean wrists and groin areas     Arrival Time: You will receive a call the afternoon before your procedure between 2 pm to 5 pm on what time you should arrive the day of your procedure    Driving After Procedure: If sedation is given, you WILL NOT be able to drive home. You will need a responsible adult  to drive you home. ;Cannot take uber or cab unless approved by physician     Discharge Teaching: Your nurse will give you specific instructions before discharge; Most people can resume normal activities in 2-3 days; Any questions, please call the physician's office     [facility] at [address] in at the [place] reception desk. Our  will be there to check you in for your procedure. Please bring your insurance cards and ID with you.  [employer] is available starting at 6 am. Please DO NOT respond to this message, the inbasket is not monitored for messages. For any questions, please call the physician's office.

## 2023-08-28 ENCOUNTER — HOSPITAL ENCOUNTER (OUTPATIENT)
Dept: INTERVENTIONAL RADIOLOGY/VASCULAR | Facility: HOSPITAL | Age: 68
Discharge: HOME | End: 2023-08-28
Attending: INTERNAL MEDICINE
Payer: MEDICARE

## 2023-08-29 ENCOUNTER — HOSPITAL ENCOUNTER (OUTPATIENT)
Dept: INTERVENTIONAL RADIOLOGY/VASCULAR | Facility: HOSPITAL | Age: 68
Discharge: HOME | End: 2023-08-29
Attending: INTERNAL MEDICINE | Admitting: INTERNAL MEDICINE
Payer: MEDICARE

## 2023-08-29 VITALS
TEMPERATURE: 97 F | DIASTOLIC BLOOD PRESSURE: 69 MMHG | WEIGHT: 185 LBS | BODY MASS INDEX: 30.82 KG/M2 | HEART RATE: 64 BPM | HEIGHT: 65 IN | SYSTOLIC BLOOD PRESSURE: 104 MMHG | RESPIRATION RATE: 15 BRPM | OXYGEN SATURATION: 94 %

## 2023-08-29 DIAGNOSIS — R07.9 CHEST PAIN: ICD-10-CM

## 2023-08-29 DIAGNOSIS — I25.10 CAD (CORONARY ARTERY DISEASE): ICD-10-CM

## 2023-08-29 LAB
ATRIAL RATE: 63 BPM
BASOPHILS # BLD AUTO: 0.05 X10(3) UL (ref 0–0.2)
BASOPHILS NFR BLD AUTO: 0.9 %
EOSINOPHIL # BLD AUTO: 0.18 X10(3) UL (ref 0–0.7)
EOSINOPHIL NFR BLD AUTO: 3.1 %
ERYTHROCYTE [DISTWIDTH] IN BLOOD BY AUTOMATED COUNT: 13 %
HCT VFR BLD AUTO: 36.9 %
HGB BLD-MCNC: 12 G/DL
IMM GRANULOCYTES # BLD AUTO: 0.02 X10(3) UL (ref 0–1)
IMM GRANULOCYTES NFR BLD: 0.3 %
ISTAT ACTIVATED CLOTTING TIME: 203 SECONDS (ref 74–137)
ISTAT ACTIVATED CLOTTING TIME: 257 SECONDS (ref 74–137)
LYMPHOCYTES # BLD AUTO: 1.71 X10(3) UL (ref 1–4)
LYMPHOCYTES NFR BLD AUTO: 29.4 %
MCH RBC QN AUTO: 27.9 PG (ref 26–34)
MCHC RBC AUTO-ENTMCNC: 32.5 G/DL (ref 31–37)
MCV RBC AUTO: 85.8 FL
MONOCYTES # BLD AUTO: 0.43 X10(3) UL (ref 0.1–1)
MONOCYTES NFR BLD AUTO: 7.4 %
NEUTROPHILS # BLD AUTO: 3.42 X10 (3) UL (ref 1.5–7.7)
NEUTROPHILS # BLD AUTO: 3.42 X10(3) UL (ref 1.5–7.7)
NEUTROPHILS NFR BLD AUTO: 58.9 %
P AXIS: 35 DEGREES
P-R INTERVAL: 162 MS
PLATELET # BLD AUTO: 241 10(3)UL (ref 150–450)
Q-T INTERVAL: 430 MS
QRS DURATION: 78 MS
QTC CALCULATION (BEZET): 440 MS
R AXIS: 22 DEGREES
RBC # BLD AUTO: 4.3 X10(6)UL
T AXIS: 49 DEGREES
VENTRICULAR RATE: 63 BPM
WBC # BLD AUTO: 5.8 X10(3) UL (ref 4–11)

## 2023-08-29 PROCEDURE — 4A023N7 MEASUREMENT OF CARDIAC SAMPLING AND PRESSURE, LEFT HEART, PERCUTANEOUS APPROACH: ICD-10-PCS | Performed by: INTERNAL MEDICINE

## 2023-08-29 PROCEDURE — B240ZZ3 ULTRASONOGRAPHY OF SINGLE CORONARY ARTERY, INTRAVASCULAR: ICD-10-PCS | Performed by: INTERNAL MEDICINE

## 2023-08-29 PROCEDURE — 93010 ELECTROCARDIOGRAM REPORT: CPT | Performed by: INTERNAL MEDICINE

## 2023-08-29 PROCEDURE — 99211 OFF/OP EST MAY X REQ PHY/QHP: CPT

## 2023-08-29 PROCEDURE — 99152 MOD SED SAME PHYS/QHP 5/>YRS: CPT | Performed by: INTERNAL MEDICINE

## 2023-08-29 PROCEDURE — 0270356 DILATION OF CORONARY ARTERY, ONE ARTERY, BIFURCATION, WITH TWO DRUG-ELUTING INTRALUMINAL DEVICES, PERCUTANEOUS APPROACH: ICD-10-PCS | Performed by: INTERNAL MEDICINE

## 2023-08-29 PROCEDURE — 4A033BC MEASUREMENT OF ARTERIAL PRESSURE, CORONARY, PERCUTANEOUS APPROACH: ICD-10-PCS | Performed by: INTERNAL MEDICINE

## 2023-08-29 PROCEDURE — 92978 ENDOLUMINL IVUS OCT C 1ST: CPT | Performed by: INTERNAL MEDICINE

## 2023-08-29 PROCEDURE — 85347 COAGULATION TIME ACTIVATED: CPT

## 2023-08-29 PROCEDURE — B2151ZZ FLUOROSCOPY OF LEFT HEART USING LOW OSMOLAR CONTRAST: ICD-10-PCS | Performed by: INTERNAL MEDICINE

## 2023-08-29 PROCEDURE — 93799 UNLISTED CV SVC/PROCEDURE: CPT | Performed by: INTERNAL MEDICINE

## 2023-08-29 PROCEDURE — B41G1ZZ FLUOROSCOPY OF LEFT LOWER EXTREMITY ARTERIES USING LOW OSMOLAR CONTRAST: ICD-10-PCS | Performed by: INTERNAL MEDICINE

## 2023-08-29 PROCEDURE — B2121ZZ FLUOROSCOPY OF SINGLE CORONARY ARTERY BYPASS GRAFT USING LOW OSMOLAR CONTRAST: ICD-10-PCS | Performed by: INTERNAL MEDICINE

## 2023-08-29 PROCEDURE — B4101ZZ FLUOROSCOPY OF ABDOMINAL AORTA USING LOW OSMOLAR CONTRAST: ICD-10-PCS | Performed by: INTERNAL MEDICINE

## 2023-08-29 PROCEDURE — B2111ZZ FLUOROSCOPY OF MULTIPLE CORONARY ARTERIES USING LOW OSMOLAR CONTRAST: ICD-10-PCS | Performed by: INTERNAL MEDICINE

## 2023-08-29 PROCEDURE — 75716 ARTERY X-RAYS ARMS/LEGS: CPT | Performed by: INTERNAL MEDICINE

## 2023-08-29 PROCEDURE — 85025 COMPLETE CBC W/AUTO DIFF WBC: CPT | Performed by: INTERNAL MEDICINE

## 2023-08-29 PROCEDURE — 99153 MOD SED SAME PHYS/QHP EA: CPT | Performed by: INTERNAL MEDICINE

## 2023-08-29 PROCEDURE — B2181ZZ FLUOROSCOPY OF LEFT INTERNAL MAMMARY BYPASS GRAFT USING LOW OSMOLAR CONTRAST: ICD-10-PCS | Performed by: INTERNAL MEDICINE

## 2023-08-29 PROCEDURE — 93005 ELECTROCARDIOGRAM TRACING: CPT

## 2023-08-29 PROCEDURE — 93459 L HRT ART/GRFT ANGIO: CPT | Performed by: INTERNAL MEDICINE

## 2023-08-29 RX ORDER — CLOPIDOGREL BISULFATE 75 MG/1
TABLET ORAL
Status: COMPLETED
Start: 2023-08-29 | End: 2023-08-29

## 2023-08-29 RX ORDER — SODIUM CHLORIDE 9 MG/ML
INJECTION, SOLUTION INTRAVENOUS
Status: DISCONTINUED | OUTPATIENT
Start: 2023-08-30 | End: 2023-08-29 | Stop reason: HOSPADM

## 2023-08-29 RX ORDER — SODIUM CHLORIDE 9 MG/ML
INJECTION, SOLUTION INTRAVENOUS CONTINUOUS
Status: DISCONTINUED | OUTPATIENT
Start: 2023-08-29 | End: 2023-08-29

## 2023-08-29 RX ORDER — NITROGLYCERIN 20 MG/100ML
INJECTION INTRAVENOUS
Status: COMPLETED
Start: 2023-08-29 | End: 2023-08-29

## 2023-08-29 RX ORDER — ASPIRIN 81 MG/1
81 TABLET ORAL DAILY
Qty: 30 TABLET | Refills: 0 | Status: SHIPPED | OUTPATIENT
Start: 2023-08-30 | End: 2023-09-30

## 2023-08-29 RX ORDER — CLOPIDOGREL BISULFATE 75 MG/1
75 TABLET ORAL DAILY
Qty: 30 TABLET | Refills: 2 | Status: SHIPPED | OUTPATIENT
Start: 2023-08-30

## 2023-08-29 RX ORDER — HEPARIN SODIUM 5000 [USP'U]/ML
INJECTION, SOLUTION INTRAVENOUS; SUBCUTANEOUS
Status: COMPLETED
Start: 2023-08-29 | End: 2023-08-29

## 2023-08-29 RX ORDER — LIDOCAINE HYDROCHLORIDE 10 MG/ML
INJECTION, SOLUTION EPIDURAL; INFILTRATION; INTRACAUDAL; PERINEURAL
Status: COMPLETED
Start: 2023-08-29 | End: 2023-08-29

## 2023-08-29 RX ORDER — MIDAZOLAM HYDROCHLORIDE 1 MG/ML
INJECTION INTRAMUSCULAR; INTRAVENOUS
Status: COMPLETED
Start: 2023-08-29 | End: 2023-08-29

## 2023-08-29 RX ORDER — ADENOSINE 3 MG/ML
INJECTION, SOLUTION INTRAVENOUS
Status: DISCONTINUED
Start: 2023-08-29 | End: 2023-08-29 | Stop reason: WASHOUT

## 2023-08-29 RX ORDER — CLOPIDOGREL BISULFATE 75 MG/1
75 TABLET ORAL DAILY
Status: DISCONTINUED | OUTPATIENT
Start: 2023-08-30 | End: 2023-08-29

## 2023-08-29 NOTE — CARDIAC REHAB
CAD education completed with patient. Stent card given to patient. Patient declined  enrolling in outpatient cardiac rehab.

## 2023-08-29 NOTE — PROCEDURES
OPERATIVE REPORT - CARDIAC CATHETERIZATION     Date of Procedure: 8/29/2023     Performing Physician: Erich Pulido. Larisa Wills MD    Pre-Procedure Diagnosis:   Angina  CAD s/p 2V CABG 7/22  Atretic LIMA based on CTA     Post-Procedure Diagnosis:  1. Same as pre    Findings:  1. LMA: No stenosis  2. LAD: Proximal 75%, moderately calcified, at bifurcation of medium sized diagonal branch. Distal, at LIMA anastomosis, there is a 60-70% narrowing. 3. LCX: Proximal 80%. Large branching OM is patent and receives flow from SVG. 4. RCA:  proximally. PDA receives grade 3 L to R collateral flow via septals. 5. LV: LVEDP 5 mmHg. No LV-Ao gradient on pullback. 6. LIMA atretic, flow does not reach LAD  7. SVG to OM, widely patent  8. L subclavian artery: Widely patent without stenosis  9. Abdominal aortogram: diffuse distal aortic and bilateral iliofemoral plaquing, up to ~ 60-70%, tortuous  10. PCI:   - iFR LAD = 0.76   - ALBA x 2 (overlapping) to proximal LAD (3.5 x 15 mm, then 3.0 x 12 mm Allison), post dilated proximally with 4.0 NC  - IVUS LAD pre and post PCI     Conclusions / Recommendations:  1. The LIMA was atretic and flow did not reach the LAD, suspect this is the culprit in her life limiting angina. Successful iFR and IVUS guided PCI of proximal LAD with overlapping ALBA x 2. The diagonal is mildly narrowed at the ostium post PCI but VERONICA 3 flow so will be managed medically. The distal LAD at the anastomosis also with moderate stenosis, however it is a small vessel there. If she has persistent angina, we can consider bringing back to treat that area. The SVG to OM is patent and the RCA  is stable. 2. Resume Xarelto tomorrow if groin stable  3. ASA 81mg daily x 1 month, the nstop  4. Plavix 75mg daily. 5. Perclose to R CFA -> Bedrest 3 hours  6. Post cath IVF    -----------------------------------------------------------     Procedures performed:   1. Left heart catheterization  2.  Selective bilateral coronary and CABG angiography  3. L subclavian artery angiography  4. Abdominal aortogram  5. Moderate sedation  6. Ultrasound guided access of right common femoral artery  7. PCI LAD with ALBA x 2  8. IVUS LAD  9. iFR LAD  10. Perclose Proglide vascular closure to right CFA     Indications: This is a 70year old female h/o CAD and 2V CABG (7/22) presenting with life limiting angina and atretic LIMA by CTA. Referred for left heart catheterization with coronary angiography to evaluate for obstructive CAD. Description of Procedure: Informed consent was obtained from the patient in writing. The risks and benefits of the procedure were reviewed in detail with the patient, including but not limited to the risk of myocardial infarction, stroke, renal failure, bleeding, and death. After a thorough discussion of these risks and benefits, the patient agreed to proceed and signed an informed consent document. The patient was brought to the cardiac catheterization laboratory in the fasting state. Moderate sedation was employed using a total of IV Versed 4 mg and IV fentanyl 200 mcg in divided doses. I directly observed the patient for greater than 30 minutes and an independent trained observer was present and assisted in the monitoring of the patient's level of consciousness and physiological status, heart rate, blood pressure, oximetry, and rhythm. All operators present for the case performed standard pre-procedural prep including hand washing, sterile gloves, gown, mask, and cap. All aspects of the maximum sterile barrier technique were followed. A preprocedural time out was performed with all physicians, technologists, and nurses involved with the procedure. 2% lidocaine was infiltrated subcutaneously in the right groin for local anesthesia. Ultrasound guided access was obtained in the right common femoral artery with a 6F [place] sheath using the modified Seldinger technique and a micropuncture needle.  Selective femoral angiography showed the arteriotomy site to be appropriate for vascular closure. Angiography was performed using 6F JL4 and JR4 diagnostic catheters, advanced over a J tipped wire through the arterial sheath, in the left and right coronary arteries respectively. The JR4 was used to engage the SVG to OM. An MARLEEN catheter was used to engage the L subclavian and then the LIMA. Standard angiographic views were taken in orthogonal projections. The JR 4 was prolapsed into the LV over a wire and placed at the base of the LV. LV systolic and end diastolic pressure was then recorded. Ventriculogram was not performed. The catheter was pulled back and measurements of LV and aortic pressure and recorded. A pigtail catheter was used to perform angiography of the abdominal aorta and iliofemoral vessels. All catheter exchanges performed over a wire. We needed a Versacor to navigate the tortuous plaquing in the R iliofemoral and distal aorta. We engaged the left coronary with 6F XB LAD guide catheter. Heparin was given based on weight to target ACT ~ 250-300 seconds. First the Karlie Kuster was advanced to just outside the catheter with the sensor at the tip of the guide. The catheter was flushed with saline. Pd/Pa was equalized. The wire was then advanced to the distal LAD. Several measurements of iFR were taken, the lowest being 0.76, very significant. IVUS was performed showing MLA < 4 mm2, with 2 quadrant calcium in the proximal LAD. A Runthrough wire was was advanced to the proximal diagonal branch for protection. A 3.0 x 12 SC balloon was inflated to above nominal for predilation. We then deployed a 3.5 x 15 mm Sharad drug stent to 14 walt, crossing the diagonal. We post dilated with a 4.0 NC to 20 wlat in the mid stent, but taking care to stay within the stent boundaries. There was slight haziness suggestive of a dissection at the distal stent edge, so we covered this with a 3.0 x 12 mm Sharad stent, to 16 walt.  We then post dilated the stent overlap to 22 walt (> 3.5 mm). IVUS was performed showing full stent apposition and expansion throughout - except for the very proximal edge, where the vessel was oblong shaped. There was about 1-2 stent struts for less than one quadrant that was not fully apposed though it was fully expanded, and the rest of the stent was fully apposed. The MSA was big, over 8 mm2, so we felt this was a good result and further dilation would not improve it. Final angiography revealed VEORNICA 3 flow through all vessels with brisk myocardial blush, full stent expansion and apposition, with no evidence of dissection or perforation. The diagonal ostium had ~ 40% stenosis likely due to plaque shifting but VERONICA 3 flow, without EKG changes and the patient had no chest pain so we decided to leave this. At the conclusion of the procedure, all catheters and wires were removed over a wire. ASA was loaded already, and we gave plavix 600mg x 1. The arterial sheath was removed and hemostasis achieved with a Perclose Proglide in the standard fashion. There was no bleeding or hematoma. Distal pulses remained intact. The patient tolerated the procedure well without complication. EBL <10 ml  Total contrast ~ 200 ml  See procedure log for fluoro time and radiation dose. Roly Hayes MD  Interventional Cardiology  [address]  [phone]

## 2023-08-29 NOTE — PROGRESS NOTES
1015- pt post PCI. Vss. Right fem artery access site is soft, drsg is CDI. 1345- pt suddenly c/o some left neck pain, then some back pain. Right fem site is now painful to touch, hematoma seems to be forming. Drsg taken down, no active oozing/bleeding fromsite. Manual pressure held. - pain medication given  - sandbag applied    Dr. Robert Sood notified of sitiuation    After lying flat, sitting up and walking in miles, right fem site remains soft with no signs of bleeding or hematoma. Discharge instructions reviewed, iv dc'd and pt discharged home with  driving.

## 2023-08-29 NOTE — DIETARY NOTE
Clinical Nutrition    Dietitian consult received per cardiac rehab standing order. Pt to be educated by cardiac rehab staff and encouraged to attend outpatient classes taught by SOPHIE. RD available PRN.     Warden Hermes SINGH, RD, LDN  Clinical Dietitian  Pager #: [phone]

## 2024-02-01 ENCOUNTER — OFFICE VISIT (OUTPATIENT)
Dept: FAMILY MEDICINE CLINIC | Facility: CLINIC | Age: 69
End: 2024-02-01
Payer: MEDICARE

## 2024-02-01 ENCOUNTER — LAB ENCOUNTER (OUTPATIENT)
Dept: LAB | Age: 69
End: 2024-02-01
Attending: STUDENT IN AN ORGANIZED HEALTH CARE EDUCATION/TRAINING PROGRAM
Payer: MEDICARE

## 2024-02-01 VITALS
RESPIRATION RATE: 16 BRPM | DIASTOLIC BLOOD PRESSURE: 70 MMHG | HEIGHT: 65 IN | BODY MASS INDEX: 32.65 KG/M2 | SYSTOLIC BLOOD PRESSURE: 118 MMHG | HEART RATE: 72 BPM | TEMPERATURE: 97 F | WEIGHT: 196 LBS

## 2024-02-01 DIAGNOSIS — E78.5 DYSLIPIDEMIA: ICD-10-CM

## 2024-02-01 DIAGNOSIS — I25.118 CORONARY ARTERY DISEASE OF NATIVE ARTERY OF NATIVE HEART WITH STABLE ANGINA PECTORIS (HCC): ICD-10-CM

## 2024-02-01 DIAGNOSIS — R73.9 HYPERGLYCEMIA: ICD-10-CM

## 2024-02-01 DIAGNOSIS — Z12.31 VISIT FOR SCREENING MAMMOGRAM: ICD-10-CM

## 2024-02-01 DIAGNOSIS — I82.409 RECURRENT DEEP VEIN THROMBOSIS (DVT) (HCC): ICD-10-CM

## 2024-02-01 DIAGNOSIS — G47.33 OSA ON CPAP: ICD-10-CM

## 2024-02-01 DIAGNOSIS — I82.502 CHRONIC DEEP VEIN THROMBOSIS (DVT) OF LEFT LOWER EXTREMITY, UNSPECIFIED VEIN (HCC): ICD-10-CM

## 2024-02-01 DIAGNOSIS — M79.605 LEFT LEG PAIN: ICD-10-CM

## 2024-02-01 DIAGNOSIS — M79.602 LEFT ARM PAIN: ICD-10-CM

## 2024-02-01 DIAGNOSIS — J44.9 CHRONIC OBSTRUCTIVE PULMONARY DISEASE, UNSPECIFIED COPD TYPE (HCC): ICD-10-CM
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
## 2024-02-01 PROBLEM — I70.0 ATHEROSCLEROSIS OF AORTA (HCC): Status: ACTIVE | Noted: 2022-07-29

## 2024-02-01 PROBLEM — J43.9 PULMONARY EMPHYSEMA (HCC): Status: ACTIVE | Noted: 2022-08-03

## 2024-02-01 PROBLEM — I77.811 ECTATIC ABDOMINAL AORTA: Status: ACTIVE | Noted: 2022-07-29

## 2024-02-01 PROBLEM — I70.0 ATHEROSCLEROSIS OF AORTA: Status: ACTIVE | Noted: 2022-07-29

## 2024-02-01 PROBLEM — N18.31 STAGE 3A CHRONIC KIDNEY DISEASE (HCC): Status: ACTIVE | Noted: 2023-03-14

## 2024-02-01 PROBLEM — D68.59 THROMBOPHILIA (HCC): Status: ACTIVE | Noted: 2023-03-14

## 2024-02-01 PROBLEM — I77.811 ECTATIC ABDOMINAL AORTA (HCC): Status: ACTIVE | Noted: 2022-07-29

## 2024-02-01 PROBLEM — F11.20 OPIOID TYPE DEPENDENCE, ABUSE (HCC): Status: ACTIVE | Noted: 2023-04-13

## 2024-02-01 LAB
ALBUMIN SERPL-MCNC: 4 G/DL (ref 3.4–5)
ALBUMIN/GLOB SERPL: 1.2 {RATIO} (ref 1–2)
ALP LIVER SERPL-CCNC: 111 U/L
ALT SERPL-CCNC: 23 U/L
ANION GAP SERPL CALC-SCNC: 7 MMOL/L (ref 0–18)
AST SERPL-CCNC: 31 U/L (ref 15–37)
BASOPHILS # BLD AUTO: 0.06 X10(3) UL (ref 0–0.2)
BASOPHILS NFR BLD AUTO: 0.9 %
BILIRUB SERPL-MCNC: 0.5 MG/DL (ref 0.1–2)
BILIRUB UR QL STRIP.AUTO: NEGATIVE
BUN BLD-MCNC: 10 MG/DL (ref 9–23)
CALCIUM BLD-MCNC: 9.7 MG/DL (ref 8.5–10.1)
CHLORIDE SERPL-SCNC: 109 MMOL/L (ref 98–112)
CO2 SERPL-SCNC: 24 MMOL/L (ref 21–32)
CREAT BLD-MCNC: 0.89 MG/DL
EGFRCR SERPLBLD CKD-EPI 2021: 70 ML/MIN/1.73M2 (ref 60–?)
EOSINOPHIL # BLD AUTO: 0.16 X10(3) UL (ref 0–0.7)
EOSINOPHIL NFR BLD AUTO: 2.4 %
ERYTHROCYTE [DISTWIDTH] IN BLOOD BY AUTOMATED COUNT: 12.8 %
EST. AVERAGE GLUCOSE BLD GHB EST-MCNC: 128 MG/DL (ref 68–126)
FASTING STATUS PATIENT QL REPORTED: YES
GLOBULIN PLAS-MCNC: 3.4 G/DL (ref 2.8–4.4)
GLUCOSE BLD-MCNC: 98 MG/DL (ref 70–99)
GLUCOSE UR STRIP.AUTO-MCNC: NORMAL MG/DL
HBA1C MFR BLD: 6.1 % (ref ?–5.7)
HCT VFR BLD AUTO: 42.5 %
HGB BLD-MCNC: 13.8 G/DL
IMM GRANULOCYTES # BLD AUTO: 0.02 X10(3) UL (ref 0–1)
IMM GRANULOCYTES NFR BLD: 0.3 %
KETONES UR STRIP.AUTO-MCNC: NEGATIVE MG/DL
LEUKOCYTE ESTERASE UR QL STRIP.AUTO: 500
LYMPHOCYTES # BLD AUTO: 1.45 X10(3) UL (ref 1–4)
LYMPHOCYTES NFR BLD AUTO: 21.9 %
MCH RBC QN AUTO: 27.9 PG (ref 26–34)
MCHC RBC AUTO-ENTMCNC: 32.5 G/DL (ref 31–37)
MCV RBC AUTO: 85.9 FL
MONOCYTES # BLD AUTO: 0.45 X10(3) UL (ref 0.1–1)
MONOCYTES NFR BLD AUTO: 6.8 %
NEUTROPHILS # BLD AUTO: 4.49 X10 (3) UL (ref 1.5–7.7)
NEUTROPHILS # BLD AUTO: 4.49 X10(3) UL (ref 1.5–7.7)
NEUTROPHILS NFR BLD AUTO: 67.7 %
NITRITE UR QL STRIP.AUTO: NEGATIVE
OSMOLALITY SERPL CALC.SUM OF ELEC: 289 MOSM/KG (ref 275–295)
PH UR STRIP.AUTO: 5 [PH] (ref 5–8)
PLATELET # BLD AUTO: 253 10(3)UL (ref 150–450)
POTASSIUM SERPL-SCNC: 3.8 MMOL/L (ref 3.5–5.1)
PROT SERPL-MCNC: 7.4 G/DL (ref 6.4–8.2)
RBC # BLD AUTO: 4.95 X10(6)UL
SODIUM SERPL-SCNC: 140 MMOL/L (ref 136–145)
SP GR UR STRIP.AUTO: 1.02 (ref 1–1.03)
TSI SER-ACNC: 1.33 MIU/ML (ref 0.36–3.74)
UROBILINOGEN UR STRIP.AUTO-MCNC: NORMAL MG/DL
WBC # BLD AUTO: 6.6 X10(3) UL (ref 4–11)
WBC #/AREA URNS AUTO: >50 /HPF

## 2024-02-01 PROCEDURE — 81001 URINALYSIS AUTO W/SCOPE: CPT

## 2024-02-01 PROCEDURE — 87088 URINE BACTERIA CULTURE: CPT

## 2024-02-01 PROCEDURE — 99204 OFFICE O/P NEW MOD 45 MIN: CPT | Performed by: STUDENT IN AN ORGANIZED HEALTH CARE EDUCATION/TRAINING PROGRAM

## 2024-02-01 PROCEDURE — 83036 HEMOGLOBIN GLYCOSYLATED A1C: CPT

## 2024-02-01 PROCEDURE — 85025 COMPLETE CBC W/AUTO DIFF WBC: CPT

## 2024-02-01 PROCEDURE — 87186 SC STD MICRODIL/AGAR DIL: CPT

## 2024-02-01 PROCEDURE — 87086 URINE CULTURE/COLONY COUNT: CPT

## 2024-02-01 PROCEDURE — 84443 ASSAY THYROID STIM HORMONE: CPT

## 2024-02-01 PROCEDURE — 80053 COMPREHEN METABOLIC PANEL: CPT

## 2024-02-01 RX ORDER — BUPRENORPHINE AND NALOXONE 2; .5 MG/1; MG/1
1 FILM, SOLUBLE BUCCAL; SUBLINGUAL DAILY
COMMUNITY

## 2024-02-01 RX ORDER — EZETIMIBE 10 MG/1
10 TABLET ORAL NIGHTLY
COMMUNITY

## 2024-02-01 NOTE — PROGRESS NOTES
[facility] Family Medicine Note  02/01/24    Chief complaint:   Chief Complaint   Patient presents with    [facility]     HPI:   Jan Babb is a 68 year old female who presents to establish care.     Five months ago had two new stents in the heart. Had a bypass previously. Follows with Dr. Walsh.    Had blood clots two years ago on boothe's day. Had blood clot in left leg.     Has some narrowing in the aorta.    Follows up with cardiology, next appointment 4/25/24 with Dr. Cruz.    Some difficulty breathing at nighttime. Has CPAP full facemask at nighttime. Oxygen is 89% while wearing the CPAP.  Oxygen drops at nighttime. Follows with Dr. Coleman.    Taking suboxone from pain management. Cuts the film in 4 and takes 4 times a day.    Has not had diazepam since December 2023 or January 2024.    Her doctor left. Was switched to a new PCP.    Her insurance specifies that patient can go to [facility].    Takes valium at nighttime to sleep.     Does not need referrals.    Patient was an RN for oncology.    Lives with . Granddaughter has down syndrome and visits once a week.    Fell, did not hit head, has big bruise on left arm and some left leg pain.       Wt Readings from Last 6 Encounters:   02/01/24 196 lb (88.9 kg)   08/25/23 185 lb (83.9 kg)   12/26/22 180 lb (81.6 kg)   08/30/22 183 lb (83 kg)   07/25/22 191 lb 2.2 oz (86.7 kg)   06/30/22 189 lb (85.7 kg)       Past Medical History:   Diagnosis Date    Anxiety state     Cataract     COPD (chronic obstructive pulmonary disease) (HCC)     Coronary atherosclerosis     Deep vein thrombosis (HCC)     Generalized anxiety disorder with panic attacks 02/09/2021    Heart murmur 02/01/2016    High cholesterol     Hx of motion sickness     Osteoarthritis     Other chest pain 01/17/2022    Sleep apnea      Past Surgical History:   Procedure Laterality Date    BACK SURGERY      BYPASS SURGERY      CATARACT  08/25/2021    right and left  with toric     CHOLECYSTECTOMY      COLONOSCOPY      EXCIS LUMBAR DISK,ONE LEVEL      HYSTERECTOMY      total hyseterectomy and oophorecomty at age 24.           OTHER  2022    Lipoma    OTHER SURGICAL HISTORY      back surgery l5s1 fusion in , then screw removed, which caussed spinal fluid leak which was fixed after three surgeries four year aso     SPINE SURGERY PROCEDURE UNLISTED      4 back surgeries    TONSILLECTOMY       Allergies   Allergen Reactions    Moxifloxacin Hcl In Nacl SHORTNESS OF BREATH    Statins OTHER (SEE COMMENTS)     Bone pain     Venlafaxine OTHER (SEE COMMENTS)     Unknown     Ketorolac Tromethamine ANXIETY    Pregabalin OTHER (SEE COMMENTS)    Trazodone OTHER (SEE COMMENTS)    Amitriptyline FATIGUE    Risperidone NAUSEA ONLY      ezetimibe 10 MG Oral Tab Take 1 tablet (10 mg total) by mouth nightly.      buprenorphine-naloxone 2-0.5 MG Sublingual Film Place 1 Film under the tongue daily.      rivaroxaban (XARELTO) 10 MG Oral Tab Take 1 tablet (10 mg total) by mouth.      clopidogrel 75 MG Oral Tab Take 1 tablet (75 mg total) by mouth daily. 30 tablet 2    metoprolol succinate ER 25 MG Oral Tablet 24 Hr Take 1 tablet (25 mg total) by mouth daily.      furosemide (LASIX) 20 MG Oral Tab Take 0.5 tablets (10 mg total) by mouth daily as needed.       Social History     Socioeconomic History    Marital status:    Tobacco Use    Smoking status: Former    Smokeless tobacco: Never    Tobacco comments:     quit 10 years ago   Vaping Use    Vaping Use: Never used   Substance and Sexual Activity    Alcohol use: Never    Drug use: Never   Other Topics Concern    Caffeine Concern No    Exercise Yes     Comment: walk 3 days a week     Counseling given: Not Answered  Tobacco comments: quit 10 years ago    Family History   Problem Relation Age of Onset    Lipids Father     Lipids Mother     No Known Problems Daughter     No Known Problems Son      Family Status   Relation Status    Fa   at age  of old age at 73     Mo  at age  at age 88     Shekhar Alive    Son Alive    MGMA     MGFA     PGMA     PGFA         REVIEW OF SYSTEMS:   See HPI    EXAM:   /70 (BP Location: Right arm, Patient Position: Sitting, Cuff Size: large)   Pulse 72   Temp 97 °F (36.1 °C) (Temporal)   Resp 16   Ht 5' 5\" (1.651 m)   Wt 196 lb (88.9 kg)   BMI 32.62 kg/m²  Estimated body mass index is 32.62 kg/m² as calculated from the following:    Height as of this encounter: 5' 5\" (1.651 m).    Weight as of this encounter: 196 lb (88.9 kg).   Vital signs reviewed. Appears stated age, well groomed.  Physical Exam:  GEN:  Patient is alert and oriented x3, no apparent distress  HEAD:  Normocephalic, atraumatic  HEENT:  no scleral icterus, conjunctivae clear bilaterally, EOMI, PERRLA, OP clear  LUNGS: clear to auscultation bilaterally, no rales/rhonchi/wheezing  HEART:  Regular rate and rhythm, normal S1/S2, no murmurs, rubs or gallops  ABDOMEN:  Bowel sounds normal, soft, nondistended, nontender, no hepatosplenomegaly  EXTREMITIES:  Moves all extremities well, + tenderness of anterior distal tibia  SKIN: bruising of left upper arm  NEURO:  CN 2 - 12 grossly intact      ASSESSMENT AND PLAN:   1. Fall, initial encounter  Patient presents to establish care, had recent fall without headstrike. Will check x-rays to rule out fracture in left arm and left leg.    2. Left arm pain  - XR HUMERUS (MIN 2 VIEWS), LEFT (CPT=73060); Future  - XR TIBIA + FIBULA (2 VIEWS), LEFT (CPT=73590); Future    3. Left leg pain  - XR HUMERUS (MIN 2 VIEWS), LEFT (CPT=73060); Future  - XR TIBIA + FIBULA (2 VIEWS), LEFT (CPT=73590); Future    4. Chronic obstructive pulmonary disease, unspecified COPD type (HCC)  Patient has hx of COPD, following with pulm, appreciate evaluation and recommendations    5. Visit for screening mammogram  Due for mammogram  - 3D Mammogram Digital Screen, Bilateral  (CPT=77067/36737); Future    6. Chronic deep vein thrombosis (DVT) of left lower extremity, unspecified vein (HCC)  Patient has hx of DVT of LLE. Will check labs, will refer to hematology, appreciate evaluation and recommendations.  - CBC With Differential With Platelet; Future  - Comp Metabolic Panel (14); Future  - Oncology/Hematology Referral - In Network    7. Coronary artery disease of native artery of native heart with stable angina pectoris (HCC)  Patient is following with cardiology and [facility], appreciate evaluation and recommendations  - CBC With Differential With Platelet; Future  - Comp Metabolic Panel (14); Future    8. Dyslipidemia  Patient has hx of HLD, will check labs  - CBC With Differential With Platelet; Future  - Comp Metabolic Panel (14); Future  - TSH W Reflex To Free T4; Future  - Urinalysis with Culture Reflex; Future    9. Hyperglycemia  Will check A1c   - Hemoglobin A1C [E]; Future    10. CAROLYN on CPAP  Continue follow up with pulm, appreciate evaluation and recommendations    11. Recurrent deep vein thrombosis (DVT) (HCC)  - Oncology/Hematology Referral - In Network        Meds & Refills for this Visit:  Requested Prescriptions      No prescriptions requested or ordered in this encounter       Stop Taking                Biotin 5 MG Oral Tab    Take 1 tablet by mouth daily.     Inclisiran Sodium (LEQVIO) 284 MG/1.5ML Subcutaneous Solution Prefilled Syringe    Inject 284 mg into the skin every 6 (six) months.            Health Maintenance:  Health Maintenance Due   Topic Date Due    Zoster Vaccines (1 of 2) Never done    Mammogram  12/20/2022    COVID-19 Vaccine (6 - 2023-24 season) 09/01/2023    MA Annual Health Assessment  01/01/2024    Fall Risk Screening (Annual)  01/01/2024       Patient/Caregiver Education: There are no barriers to learning. Medical education done.   Outcome: Patient verbalizes understanding. Patient is notified to call with any questions, complications, allergies, or  worsening or changing symptoms.  Patient is to call with any side effects or complications from the treatments as a result of today.     Problem List:  Patient Active Problem List   Diagnosis    Post-menopausal    Dyslipidemia    High triglycerides    High serum low-density lipoprotein (LDL)    Arthralgia of left acromioclavicular joint    Cervical disc disease    Heart murmur    Lumbar disc disease    Chronic back pain    COPD (chronic obstructive pulmonary disease) (HCC)    Generalized anxiety disorder with panic attacks    Inflammatory spondylopathy, unspecified spinal region (HCC)    Neck mass    Deep vein thrombosis (DVT) of left lower extremity, unspecified chronicity, unspecified vein (HCC)    Anemia    Hyperglycemia    Acute deep vein thrombosis (DVT) of femoral vein of left lower extremity (HCC)    Tobacco use disorder    Hospital discharge follow-up    Current use of long term anticoagulation    CAROLYN on CPAP    Atherosclerosis of aorta (HCC)    Coronary artery disease of native artery of native heart with stable angina pectoris (HCC)    Ectatic abdominal aorta (HCC)    Opioid type dependence, abuse (HCC)    Stage 3a chronic kidney disease (HCC)    Thrombophilia (HCC)    Pulmonary emphysema (HCC)       Return in about 3 months (around 5/1/2024) for medicare annual wellness supervisit .    Obdulia Waldrop MD  [facility]  02/01/24      Please note that portions of this note may have been completed with a voice recognition program. Efforts were made to edit the dictations but occasionally words are mis-transcribed. Thank you for your understanding.

## 2024-02-04 RX ORDER — NITROFURANTOIN 25; 75 MG/1; MG/1
100 CAPSULE ORAL 2 TIMES DAILY
Qty: 14 CAPSULE | Refills: 0 | Status: SHIPPED | OUTPATIENT
Start: 2024-02-04 | End: 2024-02-11

## 2024-02-05 ENCOUNTER — MED REC SCAN ONLY (OUTPATIENT)
Dept: FAMILY MEDICINE CLINIC | Facility: CLINIC | Age: 69
End: 2024-02-05

## 2024-02-05 ENCOUNTER — HOSPITAL ENCOUNTER (OUTPATIENT)
Dept: GENERAL RADIOLOGY | Age: 69
Discharge: HOME | End: 2024-02-05
Attending: STUDENT IN AN ORGANIZED HEALTH CARE EDUCATION/TRAINING PROGRAM
Payer: MEDICARE

## 2024-02-05 DIAGNOSIS — M79.605 LEFT LEG PAIN: ICD-10-CM

## 2024-02-05 DIAGNOSIS — M79.602 LEFT ARM PAIN: ICD-10-CM

## 2024-02-05 PROCEDURE — 73060 X-RAY EXAM OF HUMERUS: CPT | Performed by: STUDENT IN AN ORGANIZED HEALTH CARE EDUCATION/TRAINING PROGRAM

## 2024-02-05 PROCEDURE — 73590 X-RAY EXAM OF LOWER LEG: CPT | Performed by: STUDENT IN AN ORGANIZED HEALTH CARE EDUCATION/TRAINING PROGRAM

## 2024-02-06 ENCOUNTER — PATIENT MESSAGE (OUTPATIENT)
Dept: FAMILY MEDICINE CLINIC | Facility: CLINIC | Age: 69
End: 2024-02-06

## 2024-02-06 DIAGNOSIS — I77.811 ECTATIC ABDOMINAL AORTA (HCC): ICD-10-CM

## 2024-02-06 DIAGNOSIS — R10.32 LEFT GROIN PAIN: Primary | ICD-10-CM

## 2024-02-06 DIAGNOSIS — R10.9 ABDOMINAL PAIN, UNSPECIFIED ABDOMINAL LOCATION: ICD-10-CM

## 2024-02-06 NOTE — TELEPHONE ENCOUNTER
From: Jan Babb  To: Obdulia Waldrop  Sent: 2/6/2024 10:12 AM CST  Subject: Test result     You asked me to ask Dr. DEGROOT where was the blockage in the aorta. He responded said it's in the abdominal aorta. Based on the C T scan it's not obstructive. So where do we go from here. Thank you. Jan Babb [birth year]

## 2024-02-07 NOTE — TELEPHONE ENCOUNTER
Let's start with ultrasound of the abdominal aorta then we can see if CT will be needed or yearly surveillance. Thank you.

## 2024-02-07 NOTE — TELEPHONE ENCOUNTER
Would you like to order the abdominal US and US of groin? Orders pneded for approval if pended correctly.

## 2024-02-07 NOTE — TELEPHONE ENCOUNTER
Thank you for the update. We can follow up with US abdomen and US groin to evaluate further. But for the narrowing in the abdominal aorta, we can follow up with US or CT scan unless he will be doing the follow up imaging. Thank you.

## 2024-02-12 ENCOUNTER — HOSPITAL ENCOUNTER (OUTPATIENT)
Dept: ULTRASOUND IMAGING | Facility: HOSPITAL | Age: 69
Discharge: HOME | End: 2024-02-12
Attending: STUDENT IN AN ORGANIZED HEALTH CARE EDUCATION/TRAINING PROGRAM
Payer: MEDICARE

## 2024-02-12 DIAGNOSIS — R10.32 LEFT GROIN PAIN: ICD-10-CM

## 2024-02-12 DIAGNOSIS — I77.811 ECTATIC ABDOMINAL AORTA (HCC): ICD-10-CM

## 2024-02-12 DIAGNOSIS — R10.9 ABDOMINAL PAIN, UNSPECIFIED ABDOMINAL LOCATION: ICD-10-CM

## 2024-02-12 PROCEDURE — 76882 US LMTD JT/FCL EVL NVASC XTR: CPT | Performed by: STUDENT IN AN ORGANIZED HEALTH CARE EDUCATION/TRAINING PROGRAM

## 2024-02-14 ENCOUNTER — PATIENT MESSAGE (OUTPATIENT)
Dept: FAMILY MEDICINE CLINIC | Facility: CLINIC | Age: 69
End: 2024-02-14

## 2024-02-14 DIAGNOSIS — K40.90 DIRECT LEFT INGUINAL HERNIA: Primary | ICD-10-CM

## 2024-02-14 NOTE — TELEPHONE ENCOUNTER
From: Jan Babb  To: Obdulia Waldrop  Sent: 2/14/2024 11:47 AM CST  Subject: My altra sound    Do I need to get the rest of the altra sounds done. What is the surgeon name. Thank you

## 2024-02-14 NOTE — TELEPHONE ENCOUNTER
Definitely recommend the abdominal aortic ultrasound. Since a hernia can cause pain, we can wait on the US abdomen complete. Thank you.

## 2024-02-22 ENCOUNTER — HOSPITAL ENCOUNTER (OUTPATIENT)
Dept: MAMMOGRAPHY | Age: 69
Discharge: HOME | End: 2024-02-22
Attending: STUDENT IN AN ORGANIZED HEALTH CARE EDUCATION/TRAINING PROGRAM
Payer: MEDICARE

## 2024-02-22 DIAGNOSIS — Z12.31 VISIT FOR SCREENING MAMMOGRAM: ICD-10-CM

## 2024-02-22 PROCEDURE — 77067 SCR MAMMO BI INCL CAD: CPT | Performed by: STUDENT IN AN ORGANIZED HEALTH CARE EDUCATION/TRAINING PROGRAM

## 2024-02-22 PROCEDURE — 77063 BREAST TOMOSYNTHESIS BI: CPT | Performed by: STUDENT IN AN ORGANIZED HEALTH CARE EDUCATION/TRAINING PROGRAM

## 2024-02-28 ENCOUNTER — TELEPHONE (OUTPATIENT)
Dept: FAMILY MEDICINE CLINIC | Facility: CLINIC | Age: 69
End: 2024-02-28

## 2024-02-28 ENCOUNTER — OFFICE VISIT (OUTPATIENT)
Facility: LOCATION | Age: 69
End: 2024-02-28
Payer: MEDICARE

## 2024-02-28 ENCOUNTER — TELEPHONE (OUTPATIENT)
Facility: LOCATION | Age: 69
End: 2024-02-28

## 2024-02-28 VITALS — TEMPERATURE: 98 F | HEART RATE: 51 BPM

## 2024-02-28 DIAGNOSIS — K40.20 NON-RECURRENT BILATERAL INGUINAL HERNIA WITHOUT OBSTRUCTION OR GANGRENE: Primary | ICD-10-CM

## 2024-02-28 DIAGNOSIS — K40.20 BILATERAL INGUINAL HERNIA WITHOUT OBSTRUCTION OR GANGRENE, RECURRENCE NOT SPECIFIED: ICD-10-CM

## 2024-02-28 NOTE — TELEPHONE ENCOUNTER
ZACARIAS SUAREZ Patient  Member ID  [id number]    Date of Birth  1955-04-25    Gender  Female    Eligibility Status  Active Coverage    Group Number  [id number]    Plan / Coverage Date  2022-01-01    Transaction Type  Outpatient Authorization    Organization  [facility]    Payer  [facility] (COMMERCIAL & MEDICARE)    [facility] logo  Transaction ID: Not FoundCustomer ID: [id number] Date: NA  No Authorization Required  Place of Service  22 - On [facility]    Service From - To Date  NA    Admission Type  9    Diagnosis Code 1   - Unil inguinal hernia w/o obst or gangr not spcf as recur    Procedure Code 1  21423    Quantity  1 Units    Procedure From - To Date  2024-03-14    Status  NO AUTH REQUIRED    Message  No precert required. The requested service may not be eligible for coverage . Refer to the online Clinical Policy Bulletins or the Provider Code Search Tool on [facility] website. You may also contact provider services using the Precert number on the member id card.  [id number]

## 2024-02-28 NOTE — H&P
Patient ID: Jan Babb is a 68 year old female.    Chief Complaint   Patient presents with    New Patient     NP--Direct left inguinal hernia, Pt. States hernia is causing pain, also in belly button region. Pt. Denies bulging, patient feels it walking.        HPI: Jan Babb is a 68 year old female presents to clinic for evaluation.  Patient states for a while, she has had left groin pain.  She states that the pain is getting progressively worse.  She notices when she is active and moving around.  She denies any nausea or vomiting.  Her primary care ordered an ultrasound of the left groin which showed a reducible left direct inguinal hernia without bowel.  Patient was referred to general surgery for further evaluation and management.  Today, patient reports significant and severe pain in her groin especially her left groin.  She has also had pain in the right groin.  She also reports some shortness of breath and has been diagnosed with a hiatal hernia.  Her main complaint today is severe left-sided groin pain.    Patient has significant past medical history for COPD, CAD status post CABG x 2 and stents, sleep apnea, and hyperlipidemia.  She is currently on Plavix and Eliquis.  Her abdominal surgeries include a cholecystectomy.       Workup:   US GROIN LEFT LIMITED SH(CPT=76882)    Result Date: 2/12/2024  CONCLUSION:   Reducible left direct inguinal hernia containing pelvic fat.  No bowel herniation.   Dictated by (CST): David Boyle MD on 2/12/2024 at 5:54 PM     Finalized by (CST): David Boyle MD on 2/12/2024 at 6:20 PM            Past Medical History  Past Medical History:   Diagnosis Date    Anxiety state     Cataract     COPD (chronic obstructive pulmonary disease) (HCC)     Coronary atherosclerosis     Deep vein thrombosis (HCC)     Generalized anxiety disorder with panic attacks 02/09/2021    Heart murmur 02/01/2016    High cholesterol     Hx of motion sickness     Osteoarthritis     Other chest pain  2022    Sleep apnea        Past Surgical History  Past Surgical History:   Procedure Laterality Date    BACK SURGERY      BYPASS SURGERY      CATARACT  2021    right and left with toric     CHOLECYSTECTOMY      COLONOSCOPY      EXCIS LUMBAR DISK,ONE LEVEL      HYSTERECTOMY      total hyseterectomy and oophorecomty at age 24.           OTHER  2022    Lipoma    OTHER SURGICAL HISTORY      back surgery l5s1 fusion in , then screw removed, which caussed spinal fluid leak which was fixed after three surgeries four year aso     SPINE SURGERY PROCEDURE UNLISTED      4 back surgeries    TONSILLECTOMY         Medications  Current Outpatient Medications   Medication Sig Dispense Refill    ezetimibe 10 MG Oral Tab Take 1 tablet (10 mg total) by mouth nightly.      buprenorphine-naloxone 2-0.5 MG Sublingual Film Place 1 Film under the tongue daily.      rivaroxaban (XARELTO) 10 MG Oral Tab Take 1 tablet (10 mg total) by mouth.      clopidogrel 75 MG Oral Tab Take 1 tablet (75 mg total) by mouth daily. 30 tablet 2    metoprolol succinate ER 25 MG Oral Tablet 24 Hr Take 1 tablet (25 mg total) by mouth daily.      furosemide (LASIX) 20 MG Oral Tab Take 0.5 tablets (10 mg total) by mouth daily as needed.      diazePAM 5 MG Oral Tab Take 1 tablet (5 mg total) by mouth nightly as needed for Anxiety. (Patient not taking: Reported on 2024) 30 tablet 0       Allergies  Allergies   Allergen Reactions    Moxifloxacin Hcl In Nacl SHORTNESS OF BREATH    Statins OTHER (SEE COMMENTS)     Bone pain     Venlafaxine OTHER (SEE COMMENTS)     Unknown     Ketorolac Tromethamine ANXIETY    Pregabalin OTHER (SEE COMMENTS)    Trazodone OTHER (SEE COMMENTS)    Amitriptyline FATIGUE    Risperidone NAUSEA ONLY       Social History  History   Smoking Status    Former   Smokeless Tobacco    Never     History   Alcohol Use Never     History   Drug Use Unknown       Family History  Family History   Problem Relation Age of Onset     Lipids Father     Lipids Mother     No Known Problems Daughter     No Known Problems Son        Review of Systems  Review of Systems   Constitutional: Negative.    Respiratory: Negative.     Cardiovascular: Negative.    Gastrointestinal:  Positive for abdominal pain and nausea. Negative for abdominal distention, constipation and vomiting.       Exam  Vitals:    02/28/24 1139   Pulse: 51   Temp: 98.2 °F (36.8 °C)     Physical Exam  Constitutional:       Appearance: Normal appearance.   Cardiovascular:      Rate and Rhythm: Normal rate.   Pulmonary:      Effort: Pulmonary effort is normal.   Abdominal:      General: Abdomen is flat. There is no distension.      Palpations: Abdomen is soft.      Tenderness: There is no abdominal tenderness.      Hernia: A hernia is present.       Skin:     General: Skin is warm and dry.   Neurological:      Mental Status: She is alert and oriented to person, place, and time.         Assessment/Plan  Assessment   Problem List Items Addressed This Visit    None  Visit Diagnoses       Non-recurrent bilateral inguinal hernia without obstruction or gangrene    -  Primary            Jan Babb is a 68 year old female with bilateral inguinal hernias    Prior CT imaging patient had done in 2022 showed bilateral inguinal hernias  Ultrasound imaging shows reducible left inguinal hernia with herniated fat  Patient is symptomatic with severe pain  She does not describe any episodes concerning for incarceration  She does not notice a bulge at times  She is interested in having her hernias repaired    She has significant history of CAD status post CABG x 2 as well as stent placement  She is currently on Plavix and Eliquis  She also reports shortness of breath is being followed by pulmonologist    We can plan for robotic assisted bilateral inguinal hernia repair with mesh  Procedure explained to the patient  Risks include bleeding, pain, infection, risk of opening, recurrence, and need for further  intervention  Patient has significant past medical history for CAD status post CABG, currently on Plavix and Eliquis  She will need cardiac clearance as well as anticoagulation management  Patient is also being followed by pulmonologist, she will need clearance  Finally, will ask for medical clearance      Symptoms of incarceration including severe and unrelenting pain with associated nausea, vomiting, inability to have bowel movement discussed with the patient  If she has any of these, she may need more urgent emergent surgery    Patient can call the office for any questions or concerns      Halley Collier MD  General Surgery  [facility]     CC:  Obdulia Waldrop MD

## 2024-02-28 NOTE — TELEPHONE ENCOUNTER
Pre-Op paperwork received by fax.    DOS:  3/144/24  Surgeon: Halley Collier  Procedure: ROBOTIC LAPAROSCOPIC BILATERAL INGUNAL HERNIA REPAIR WITH MESH      On day of pre-op will provide:  Surgical Clearance    And fax results to: [phone]

## 2024-02-29 ENCOUNTER — TELEPHONE (OUTPATIENT)
Facility: LOCATION | Age: 69
End: 2024-02-29

## 2024-03-04 ENCOUNTER — TELEPHONE (OUTPATIENT)
Facility: LOCATION | Age: 69
End: 2024-03-04

## 2024-03-04 DIAGNOSIS — K40.20 NON-RECURRENT BILATERAL INGUINAL HERNIA WITHOUT OBSTRUCTION OR GANGRENE: Primary | ICD-10-CM

## 2024-04-10 ENCOUNTER — HOSPITAL ENCOUNTER (OUTPATIENT)
Dept: ULTRASOUND IMAGING | Age: 69
Discharge: HOME | End: 2024-04-10
Attending: STUDENT IN AN ORGANIZED HEALTH CARE EDUCATION/TRAINING PROGRAM
Payer: MEDICARE

## 2024-04-10 DIAGNOSIS — R10.32 LEFT GROIN PAIN: ICD-10-CM

## 2024-04-10 DIAGNOSIS — I77.811 ECTATIC ABDOMINAL AORTA (HCC): ICD-10-CM

## 2024-04-10 DIAGNOSIS — R10.9 ABDOMINAL PAIN, UNSPECIFIED ABDOMINAL LOCATION: ICD-10-CM

## 2024-04-10 PROCEDURE — 76700 US EXAM ABDOM COMPLETE: CPT | Performed by: STUDENT IN AN ORGANIZED HEALTH CARE EDUCATION/TRAINING PROGRAM

## 2024-04-10 PROCEDURE — 76770 US EXAM ABDO BACK WALL COMP: CPT | Performed by: STUDENT IN AN ORGANIZED HEALTH CARE EDUCATION/TRAINING PROGRAM

## 2024-05-03 ENCOUNTER — LAB ENCOUNTER (OUTPATIENT)
Dept: LAB | Age: 69
End: 2024-05-03
Attending: STUDENT IN AN ORGANIZED HEALTH CARE EDUCATION/TRAINING PROGRAM
Payer: MEDICARE

## 2024-05-03 ENCOUNTER — OFFICE VISIT (OUTPATIENT)
Dept: FAMILY MEDICINE CLINIC | Facility: CLINIC | Age: 69
End: 2024-05-03
Payer: MEDICARE

## 2024-05-03 VITALS
HEIGHT: 65 IN | SYSTOLIC BLOOD PRESSURE: 120 MMHG | TEMPERATURE: 97 F | HEART RATE: 66 BPM | BODY MASS INDEX: 31.55 KG/M2 | DIASTOLIC BLOOD PRESSURE: 82 MMHG | RESPIRATION RATE: 16 BRPM | WEIGHT: 189.38 LBS

## 2024-05-03 DIAGNOSIS — K40.90 LEFT INGUINAL HERNIA: ICD-10-CM

## 2024-05-03 DIAGNOSIS — R30.0 DYSURIA: ICD-10-CM

## 2024-05-03 DIAGNOSIS — Z87.440 HISTORY OF UTI: ICD-10-CM

## 2024-05-03 DIAGNOSIS — K44.9 HIATAL HERNIA: ICD-10-CM

## 2024-05-03 DIAGNOSIS — K76.0 FATTY LIVER: ICD-10-CM

## 2024-05-03 DIAGNOSIS — R10.84 GENERALIZED ABDOMINAL PAIN: Primary | ICD-10-CM

## 2024-05-03 DIAGNOSIS — N28.9 FUNCTION KIDNEY DECREASED: ICD-10-CM

## 2024-05-03 DIAGNOSIS — R73.03 PREDIABETES: ICD-10-CM

## 2024-05-03 DIAGNOSIS — R10.13 EPIGASTRIC PAIN: ICD-10-CM

## 2024-05-03 LAB
ALBUMIN SERPL-MCNC: 3.7 G/DL (ref 3.4–5)
ALBUMIN/GLOB SERPL: 1 {RATIO} (ref 1–2)
ALP LIVER SERPL-CCNC: 99 U/L
ALT SERPL-CCNC: 19 U/L
ANION GAP SERPL CALC-SCNC: 6 MMOL/L (ref 0–18)
AST SERPL-CCNC: 10 U/L (ref 15–37)
BASOPHILS # BLD AUTO: 0.04 X10(3) UL (ref 0–0.2)
BASOPHILS NFR BLD AUTO: 0.6 %
BILIRUB SERPL-MCNC: 0.5 MG/DL (ref 0.1–2)
BUN BLD-MCNC: 10 MG/DL (ref 9–23)
CALCIUM BLD-MCNC: 9.7 MG/DL (ref 8.5–10.1)
CHLORIDE SERPL-SCNC: 110 MMOL/L (ref 98–112)
CO2 SERPL-SCNC: 25 MMOL/L (ref 21–32)
CREAT BLD-MCNC: 1.09 MG/DL
EGFRCR SERPLBLD CKD-EPI 2021: 55 ML/MIN/1.73M2 (ref 60–?)
EOSINOPHIL # BLD AUTO: 0.17 X10(3) UL (ref 0–0.7)
EOSINOPHIL NFR BLD AUTO: 2.7 %
ERYTHROCYTE [DISTWIDTH] IN BLOOD BY AUTOMATED COUNT: 13 %
EST. AVERAGE GLUCOSE BLD GHB EST-MCNC: 126 MG/DL (ref 68–126)
FASTING STATUS PATIENT QL REPORTED: YES
GLOBULIN PLAS-MCNC: 3.7 G/DL (ref 2.8–4.4)
GLUCOSE BLD-MCNC: 105 MG/DL (ref 70–99)
HBA1C MFR BLD: 6 % (ref ?–5.7)
HCT VFR BLD AUTO: 44.6 %
HGB BLD-MCNC: 14.1 G/DL
IMM GRANULOCYTES # BLD AUTO: 0.01 X10(3) UL (ref 0–1)
IMM GRANULOCYTES NFR BLD: 0.2 %
LIPASE SERPL-CCNC: 30 U/L (ref ?–300)
LYMPHOCYTES # BLD AUTO: 1.65 X10(3) UL (ref 1–4)
LYMPHOCYTES NFR BLD AUTO: 25.8 %
MCH RBC QN AUTO: 27.8 PG (ref 26–34)
MCHC RBC AUTO-ENTMCNC: 31.6 G/DL (ref 31–37)
MCV RBC AUTO: 88 FL
MONOCYTES # BLD AUTO: 0.55 X10(3) UL (ref 0.1–1)
MONOCYTES NFR BLD AUTO: 8.6 %
NEUTROPHILS # BLD AUTO: 3.97 X10 (3) UL (ref 1.5–7.7)
NEUTROPHILS # BLD AUTO: 3.97 X10(3) UL (ref 1.5–7.7)
NEUTROPHILS NFR BLD AUTO: 62.1 %
OSMOLALITY SERPL CALC.SUM OF ELEC: 291 MOSM/KG (ref 275–295)
PLATELET # BLD AUTO: 274 10(3)UL (ref 150–450)
POTASSIUM SERPL-SCNC: 3.8 MMOL/L (ref 3.5–5.1)
PROT SERPL-MCNC: 7.4 G/DL (ref 6.4–8.2)
RBC # BLD AUTO: 5.07 X10(6)UL
SODIUM SERPL-SCNC: 141 MMOL/L (ref 136–145)
WBC # BLD AUTO: 6.4 X10(3) UL (ref 4–11)

## 2024-05-03 PROCEDURE — 87088 URINE BACTERIA CULTURE: CPT

## 2024-05-03 PROCEDURE — 83036 HEMOGLOBIN GLYCOSYLATED A1C: CPT

## 2024-05-03 PROCEDURE — 87086 URINE CULTURE/COLONY COUNT: CPT

## 2024-05-03 PROCEDURE — 99214 OFFICE O/P EST MOD 30 MIN: CPT | Performed by: STUDENT IN AN ORGANIZED HEALTH CARE EDUCATION/TRAINING PROGRAM

## 2024-05-03 PROCEDURE — 83690 ASSAY OF LIPASE: CPT

## 2024-05-03 PROCEDURE — 80053 COMPREHEN METABOLIC PANEL: CPT

## 2024-05-03 PROCEDURE — 87186 SC STD MICRODIL/AGAR DIL: CPT

## 2024-05-03 PROCEDURE — 85025 COMPLETE CBC W/AUTO DIFF WBC: CPT

## 2024-05-03 RX ORDER — FAMOTIDINE 20 MG/1
20 TABLET, FILM COATED ORAL 2 TIMES DAILY PRN
Qty: 60 TABLET | Refills: 0 | Status: SHIPPED | OUTPATIENT
Start: 2024-05-03

## 2024-05-03 NOTE — PROGRESS NOTES
[facility] Family Medicine Note  05/03/24    Chief Complaint   Patient presents with    Test Results     HPI:   Jan Babb is a 69 year old female who presents for follow up.  Celebrated birthday about a week ago.    Has hiatal hernia, had 30/10 pain epigastric pain and bloating yesterday and almost went to ER but then it improved after having tums. Has bilateral inguinal hernias to be repaired as well. To see surgeon in two weeks. Some has a bowel movement after eating. Losing weight due to not being able to eat a lot with the hiatal. Staying hydrated.    Saw cardiologist and he recommended yearly follow up.    Was on metformin in the past and had very low blood sugar.    Not walking a lot due to pain.     Patient has 10/10 abdominal pain with pressure.    Lab Results   Component Value Date    A1C 6.0 (H) 05/03/2024    A1C 6.1 (H) 02/01/2024    A1C 6.1 (H) 07/13/2022        US abdominal aorta 4/10/24:  Impression   CONCLUSION:  Mild ectasia of the distal infrarenal abdominal aorta measuring 2.0 x 2.0 cm.     US abdomen complete 4/10/24:  Impression   CONCLUSION:    1. Suggestion of mild fatty infiltration of the liver, correlate clinically.  2. Cholecystectomy.       Wt Readings from Last 6 Encounters:   05/03/24 189 lb 6.4 oz (85.9 kg)   02/29/24 196 lb (88.9 kg)   02/01/24 196 lb (88.9 kg)   08/25/23 185 lb (83.9 kg)   12/26/22 180 lb (81.6 kg)   08/30/22 183 lb (83 kg)       Past Medical History:    Anxiety state    Cataract    COPD (chronic obstructive pulmonary disease) (HCC)    Coronary atherosclerosis    Deep vein thrombosis (HCC)    STENT LEFT LEG    Generalized anxiety disorder with panic attacks    Heart murmur    High blood pressure    High cholesterol    Hx of motion sickness    Osteoarthritis    Other chest pain    Sleep apnea    CPAP    Visual impairment    GLASSES     Past Surgical History:   Procedure Laterality Date    Back surgery      Bypass surgery      Cabg  07/2022     Cataract  2021    right and left with toric     Cath bare metal stent (bms)  2023    X2    Cholecystectomy      Colonoscopy      Excis lumbar disk,one level      Hysterectomy      total hyseterectomy and oophorecomty at age 24.           Other  2022    Lipoma    Other surgical history      back surgery l5s1 fusion in , then screw removed, which caussed spinal fluid leak which was fixed after three surgeries four year aso     Spine surgery procedure unlisted      4 back surgeries    Tonsillectomy       Allergies   Allergen Reactions    Moxifloxacin Hcl In Nacl SHORTNESS OF BREATH    Statins OTHER (SEE COMMENTS)     Bone pain     Venlafaxine OTHER (SEE COMMENTS)     Unknown     Ketorolac Tromethamine ANXIETY    Pregabalin OTHER (SEE COMMENTS)    Trazodone OTHER (SEE COMMENTS)    Amitriptyline FATIGUE    Risperidone NAUSEA ONLY      famotidine 20 MG Oral Tab Take 1 tablet (20 mg total) by mouth 2 (two) times daily as needed for Heartburn. 60 tablet 0    ezetimibe 10 MG Oral Tab Take 1 tablet (10 mg total) by mouth nightly.      rivaroxaban (XARELTO) 10 MG Oral Tab Take 1 tablet (10 mg total) by mouth.      clopidogrel 75 MG Oral Tab Take 1 tablet (75 mg total) by mouth daily. 30 tablet 2    metoprolol succinate ER 25 MG Oral Tablet 24 Hr Take 1 tablet (25 mg total) by mouth daily.      furosemide (LASIX) 20 MG Oral Tab Take 0.5 tablets (10 mg total) by mouth daily as needed.       Social History     Socioeconomic History    Marital status:    Tobacco Use    Smoking status: Former     Current packs/day: 0.00     Types: Cigarettes     Quit date:      Years since quitting: 10.3    Smokeless tobacco: Never    Tobacco comments:     quit 10 years ago   Vaping Use    Vaping status: Never Used   Substance and Sexual Activity    Alcohol use: Never    Drug use: Never   Other Topics Concern    Caffeine Concern No    Exercise Yes     Comment: walk 3 days a week     Counseling given: Not  Answered  Tobacco comments: quit 10 years ago    Family History   Problem Relation Age of Onset    Lipids Father     Lipids Mother     No Known Problems Daughter     No Known Problems Son      Family Status   Relation Status    Fa  at age  of old age at 73     Mo  at age  at age 88     Shekhar Alive    Son Alive    MGMA     MGFA     PGMA     PGFA         REVIEW OF SYSTEMS:   See HPI    EXAM:   /82 (BP Location: Left arm, Patient Position: Sitting, Cuff Size: adult)   Pulse 66   Temp 97 °F (36.1 °C) (Temporal)   Resp 16   Ht 5' 5\" (1.651 m)   Wt 189 lb 6.4 oz (85.9 kg)   BMI 31.52 kg/m²  Estimated body mass index is 31.52 kg/m² as calculated from the following:    Height as of this encounter: 5' 5\" (1.651 m).    Weight as of this encounter: 189 lb 6.4 oz (85.9 kg).   Vital signs reviewed. Appears stated age, well groomed.  Physical Exam:  GEN:  Patient is alert and oriented x3, no apparent distress  HEAD:  Normocephalic, atraumatic  HEENT:  no scleral icterus, conjunctivae clear bilaterally, EOMI, PERRLA, OP clear  LUNGS: clear to auscultation bilaterally, no rales/rhonchi/wheezing  HEART:  Regular rate and rhythm, normal S1/S2, no murmurs, rubs or gallops  ABDOMEN:  Bowel sounds normal, soft, +tender in all four quadrants to light palpation with voluntary guarding without rebound tenderness  EXTREMITIES:  Moves all extremities well  NEURO:  CN 2 - 12 grossly intact, gait normal      ASSESSMENT AND PLAN:   1. Generalized abdominal pain  Patient presents for generalized abdominal pain. Differential diagnosis includes diverticulitis, pancreatitis, choledocholithiasis, hernia incarceration. STAT CT abdomen/pelvis with contrast ordered. To ER if symptoms are worsening prior to imaging study. Follow up pending results.   - CT ABDOMEN+PELVIS(CONTRAST ONLY)(CPT=74177); Future    2. Hiatal hernia  Will check barium swallow to further evaluate. Can take  famotidine 20mg BID since patient is on plavix and PPI is not recommended due to drug interactions. Will check labs.  - XR UGI/ESOPHAGUS DOUBLE CONTRAST (CPT=74246); Future  - famotidine 20 MG Oral Tab; Take 1 tablet (20 mg total) by mouth 2 (two) times daily as needed for Heartburn.  Dispense: 60 tablet; Refill: 0  - Lipase [E]; Future  - Comp Metabolic Panel (14) [E]; Future  - CBC W Differential W Platelet [E]; Future  - CT ABDOMEN+PELVIS(CONTRAST ONLY)(CPT=74177); Future    3. Fatty liver  Patient has fatty liver, will further evaluate with liver US with elastography.   - US LIVER WITH ELASTOGRAPHY(CPT=76705,25130); Future  - Lipase [E]; Future  - Comp Metabolic Panel (14) [E]; Future  - CBC W Differential W Platelet [E]; Future  - CT ABDOMEN+PELVIS(CONTRAST ONLY)(CPT=74177); Future    4. Left inguinal hernia  To see surgery this month.    5. Prediabetes  Patient has hx of prediabetes, will check A1c  - Hemoglobin A1C [E]; Future    6. History of UTI  Patient had UTI and now has dysuria. Will check Urine culture.  - Urine Culture, Routine [E]; Future    7. Dysuria  - Urine Culture, Routine [E]; Future  - CT ABDOMEN+PELVIS(CONTRAST ONLY)(CPT=74177); Future    8. Epigastric pain  - Lipase [E]; Future  - Comp Metabolic Panel (14) [E]; Future  - CBC W Differential W Platelet [E]; Future  - H. Pylori Stool Ag, EIA [E]; Future    9. Function kidney decreased  - Comp Metabolic Panel (14) [E]; Future        Meds & Refills for this Visit:  Requested Prescriptions     Signed Prescriptions Disp Refills    famotidine 20 MG Oral Tab 60 tablet 0     Sig: Take 1 tablet (20 mg total) by mouth 2 (two) times daily as needed for Heartburn.       Start Taking               famotidine 20 MG Oral Tab Take 1 tablet (20 mg total) by mouth 2 (two) times daily as needed for Heartburn.          Stop Taking                buprenorphine-naloxone 2-0.5 MG Sublingual Film    Place 1 Film under the tongue daily. Takes 1/4 of strip as needed             Health Maintenance:  Health Maintenance Due   Topic Date Due    COVID-19 Vaccine (6 - 2023-24 season) 09/01/2023    MA Annual Health Assessment  01/01/2024    Fall Risk Screening (Annual)  01/01/2024       Patient/Caregiver Education: There are no barriers to learning. Medical education done.   Outcome: Patient verbalizes understanding. Patient is notified to call with any questions, complications, allergies, or worsening or changing symptoms.  Patient is to call with any side effects or complications from the treatments as a result of today.     Problem List:  Patient Active Problem List   Diagnosis    Post-menopausal    Dyslipidemia    High triglycerides    High serum low-density lipoprotein (LDL)    Arthralgia of left acromioclavicular joint    Cervical disc disease    Heart murmur    Lumbar disc disease    Chronic back pain    COPD (chronic obstructive pulmonary disease) (HCC)    Generalized anxiety disorder with panic attacks    Inflammatory spondylopathy, unspecified spinal region (HCC)    Neck mass    Deep vein thrombosis (DVT) of left lower extremity, unspecified chronicity, unspecified vein (HCC)    Anemia    Hyperglycemia    Acute deep vein thrombosis (DVT) of femoral vein of left lower extremity (HCC)    Tobacco use disorder    Hospital discharge follow-up    Current use of long term anticoagulation    CAROLYN on CPAP    Atherosclerosis of aorta (HCC)    Coronary artery disease of native artery of native heart with stable angina pectoris (HCC)    Ectatic abdominal aorta (HCC)    Opioid type dependence, abuse (HCC)    Stage 3a chronic kidney disease (HCC)    Thrombophilia (HCC)    Pulmonary emphysema (HCC)    Fatty liver    Hiatal hernia       No follow-ups on file. Pending results.     Obdulia Waldrop MD  [facility]  05/03/24      Please note that portions of this note may have been completed with a voice recognition program. Efforts were made to edit the  dictations but occasionally words are mis-transcribed. Thank you for your understanding.

## 2024-05-04 ENCOUNTER — HOSPITAL ENCOUNTER (OUTPATIENT)
Dept: CT IMAGING | Facility: HOSPITAL | Age: 69
Discharge: HOME | End: 2024-05-04
Attending: STUDENT IN AN ORGANIZED HEALTH CARE EDUCATION/TRAINING PROGRAM
Payer: MEDICARE

## 2024-05-04 ENCOUNTER — PATIENT MESSAGE (OUTPATIENT)
Dept: FAMILY MEDICINE CLINIC | Facility: CLINIC | Age: 69
End: 2024-05-04

## 2024-05-04 DIAGNOSIS — K76.0 FATTY LIVER: ICD-10-CM

## 2024-05-04 DIAGNOSIS — R10.84 GENERALIZED ABDOMINAL PAIN: ICD-10-CM

## 2024-05-04 DIAGNOSIS — R30.0 DYSURIA: ICD-10-CM

## 2024-05-04 DIAGNOSIS — K44.9 HIATAL HERNIA: ICD-10-CM

## 2024-05-04 PROCEDURE — 74177 CT ABD & PELVIS W/CONTRAST: CPT | Performed by: STUDENT IN AN ORGANIZED HEALTH CARE EDUCATION/TRAINING PROGRAM

## 2024-05-04 PROCEDURE — 87338 HPYLORI STOOL AG IA: CPT

## 2024-05-04 RX ORDER — CEPHALEXIN 500 MG/1
500 CAPSULE ORAL 4 TIMES DAILY
Qty: 28 CAPSULE | Refills: 0 | Status: SHIPPED | OUTPATIENT
Start: 2024-05-04 | End: 2024-05-11

## 2024-05-06 ENCOUNTER — LAB ENCOUNTER (OUTPATIENT)
Dept: LAB | Age: 69
End: 2024-05-06
Attending: STUDENT IN AN ORGANIZED HEALTH CARE EDUCATION/TRAINING PROGRAM
Payer: MEDICARE

## 2024-05-06 DIAGNOSIS — R10.13 EPIGASTRIC PAIN: ICD-10-CM

## 2024-05-06 NOTE — TELEPHONE ENCOUNTER
Please check on patient. Lube can cause yeast infections or bacterial vaginosis, but patient's current infection is a UTI. If she is using lube for vaginal dryness other options would be replens or rephresh which is helpful in postmenopausal women. How is she feeling since starting the antibiotics for the UTI? If she had vaginal discharge since starting the UTI I can send diflucan as well. Thank you.

## 2024-05-06 NOTE — TELEPHONE ENCOUNTER
Please see last office visit notes and lab results 4/3/2024 for further information.  Started on Antibiotics over the weekend.  Patient expressed concern that lubricant is possible cause of her infection.   Please give feedback/recommendation. Thank you.

## 2024-05-06 NOTE — TELEPHONE ENCOUNTER
From: Jan Babb  To: Obdulia Waldrop  Sent: 5/4/2024 8:25 PM CDT  Subject: Question     I been using a lube over the counter. I think this might be what's causing my infection. Is there something you can order. If so please order it. Thank you Jan Babb [birth year]

## 2024-05-07 ENCOUNTER — MED REC SCAN ONLY (OUTPATIENT)
Dept: FAMILY MEDICINE CLINIC | Facility: CLINIC | Age: 69
End: 2024-05-07

## 2024-05-07 LAB — H PYLORI AG STL QL IA: NEGATIVE

## 2024-05-23 ENCOUNTER — OFFICE VISIT (OUTPATIENT)
Facility: LOCATION | Age: 69
End: 2024-05-23

## 2024-05-23 VITALS — TEMPERATURE: 98 F | HEART RATE: 62 BPM

## 2024-05-23 DIAGNOSIS — K40.20 NON-RECURRENT BILATERAL INGUINAL HERNIA WITHOUT OBSTRUCTION OR GANGRENE: Primary | ICD-10-CM

## 2024-05-23 DIAGNOSIS — K44.9 HIATAL HERNIA: ICD-10-CM

## 2024-05-23 RX ORDER — CLOTRIMAZOLE AND BETAMETHASONE DIPROPIONATE 10; .64 MG/G; MG/G
1 CREAM TOPICAL 2 TIMES DAILY
Qty: 45 G | Refills: 0 | Status: SHIPPED | OUTPATIENT
Start: 2024-05-23

## 2024-05-23 NOTE — PROGRESS NOTES
Patient ID: Jan Babb is a 69 year old female.    Chief Complaint   Patient presents with    [facility]     EP - hiatal hernia consult and two hernia in both groin area, painful, legs going out when standing or walking, leg pain,  CT ABDOMEN+PELVIS (CONTRAST ONLY) 5/4/24, no other symptoms.       HPI: Jan Babb is a 69 year old female to clinic for follow-up.  Since last clinic appointment, patient reports worsening symptoms of her bilateral inguinal hernias as well as her hiatal hernia.    Workup:   CT ABDOMEN+PELVIS(CONTRAST ONLY)(CPT=74177)    Result Date: 5/4/2024  CONCLUSION:  1. A specific etiology for dysuria is not detected on this study.  Kidneys, ureters and bladder are unremarkable on CT. 2. There is diffuse aortic atherosclerosis with ectasia of the infrarenal aorta.  Maximum diameter of the aorta is 2.1 cm. 3. 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 4. Small amount of free fluid in pelvis is likely physiologic. 5. There is diverticulosis of colon without CT evidence of diverticulitis.    LOCATION:  [facility]   Dictated by (CST): Antonio Beach MD on 5/04/2024 at 9:23 AM     Finalized by (CST): Antonio Beach MD on 5/04/2024 at 9:30 AM            Past Medical History  Past Medical History:    Anxiety state    Cataract    COPD (chronic obstructive pulmonary disease) (HCC)    Coronary atherosclerosis    Deep vein thrombosis (HCC)    STENT LEFT LEG    Generalized anxiety disorder with panic attacks    Heart murmur    High blood pressure    High cholesterol    Hx of motion sickness    Osteoarthritis    Other chest pain    Sleep apnea    CPAP    Visual impairment    GLASSES       Past Surgical History  Past Surgical History:   Procedure Laterality Date    Back surgery      Bypass surgery      Cabg  07/2022    Cataract  08/25/2021    right and left with toric     Cath bare metal stent (bms)  2023    X2    Cholecystectomy      Colonoscopy       Excis lumbar disk,one level      Hysterectomy      total hyseterectomy and oophorecomty at age 24.           Other  2022    Lipoma    Other surgical history      back surgery l5s1 fusion in , then screw removed, which caussed spinal fluid leak which was fixed after three surgeries four year [facility]     Spine surgery procedure unlisted      4 back surgeries    Tonsillectomy         Medications  Current Outpatient Medications   Medication Sig Dispense Refill    clotrimazole-betamethasone 1-0.05 % External Cream Apply 1 Application topically 2 (two) times daily. Apply to affected area. 45 g 0    famotidine 20 MG Oral Tab Take 1 tablet (20 mg total) by mouth 2 (two) times daily as needed for Heartburn. 60 tablet 0    ezetimibe 10 MG Oral Tab Take 1 tablet (10 mg total) by mouth nightly.      rivaroxaban (XARELTO) 10 MG Oral Tab Take 1 tablet (10 mg total) by mouth.      clopidogrel 75 MG Oral Tab Take 1 tablet (75 mg total) by mouth daily. 30 tablet 2    metoprolol succinate ER 25 MG Oral Tablet 24 Hr Take 1 tablet (25 mg total) by mouth daily.      furosemide (LASIX) 20 MG Oral Tab Take 0.5 tablets (10 mg total) by mouth daily as needed.         Allergies  Allergies   Allergen Reactions    Moxifloxacin Hcl In Nacl SHORTNESS OF BREATH    Statins OTHER (SEE COMMENTS)     Bone pain     Venlafaxine OTHER (SEE COMMENTS)     Unknown     Ketorolac Tromethamine ANXIETY    Pregabalin OTHER (SEE COMMENTS)    Trazodone OTHER (SEE COMMENTS)    Amitriptyline FATIGUE    Risperidone NAUSEA ONLY       Social History  History   Smoking Status    Former    Types: Cigarettes   Smokeless Tobacco    Never     History   Alcohol Use Never     History   Drug Use Unknown       Family History  Family History   Problem Relation Age of Onset    Lipids Father     Lipids Mother     No Known Problems Daughter     No Known Problems Son        Review of Systems  Review of Systems   Constitutional: Negative.    Respiratory: Negative.      Cardiovascular: Negative.    Gastrointestinal:  Negative for abdominal distention, abdominal pain, constipation, nausea and vomiting.       Exam  Vitals:    05/23/24 1403   Pulse: 62   Temp: 98.1 °F (36.7 °C)     Physical Exam  Constitutional:       Appearance: Normal appearance.   Cardiovascular:      Rate and Rhythm: Normal rate.   Pulmonary:      Effort: Pulmonary effort is normal.   Abdominal:      General: Abdomen is flat. There is no distension.      Palpations: Abdomen is soft.      Tenderness: There is no abdominal tenderness.      Hernia: A hernia is present.       Skin:     General: Skin is warm and dry.   Neurological:      Mental Status: She is alert and oriented to person, place, and time.           Assessment/Plan  Assessment   Problem List Items Addressed This Visit          Gastrointestinal and Abdominal    Hiatal hernia    Non-recurrent bilateral inguinal hernia without obstruction or gangrene - Primary       Jan Babb is a 69 year old female with bilateral inguinal hernia, hiatal hernia, CAD status post PCI and on Plavix and Xarelto    Today, patient comes in with worsening pain at her right groin  Previously, the left inguinal hernia was symptomatic  Today, she states that both the groins are symptomatic, limiting her activity including walking  Patient followed up with her cardiologist and is still limited in till August of this year, which will be 1 year after her PCI  At that time, she will follow-up with her cardiologist for surgical clearance as well as anticoagulation management  Patient is also following up with pulmonologist    Based on patient's medical comorbidities, recommend that we move forward with open bilateral inguinal hernia repair with mesh  Once patient has seen the cardiologist and pulmonologist, she will follow-up with me for surgical planning  In the meantime, I spoke to the patient about hernia belt or truss to help with her symptoms  If she has any worsening symptoms  including symptoms of incarceration with severe pain, nausea, vomiting, she is to call my office or go to the emergency room for evaluation    Patient also has a known hiatal hernia  CT abdomen pelvis from earlier this month reviewed directly by me and with the patient  It shows a small hiatal hernia with small amounts of stomach in the chest  Patient states that she has been taking a 24-hour pill for heartburn for approximately a month now  She thinks that this is omeprazole or Prilosec  Patient was previously instructed by Dr. Waldrop to take famotidine instead of a PPI or omeprazole as this can interfere with her Plavix  I recommend patient continue with the famotidine, she can take this twice a day once in the morning and once in the evening  Because her hiatal hernia is small, recommend optimizing medical management to see if we can get control of her symptoms    On physical exam, patient was found to have redness and irritation around her umbilicus  It was tender to palpation  On the CT scan reviewed by me, patient does have a small umbilical hernia  I gave her prescription for clotrimazole to help with the irritation and pain    Patient will follow-up once she has seen the cardiologist and pulmonologist for surgical planning  At that time, we can review her heartburn symptoms as well as her umbilical issue    She is to call the office for any questions or concerns    Halley Collier MD  General Surgery  [facility]     CC:  Obdulia Waldrop MD

## 2024-06-02 DIAGNOSIS — K44.9 HIATAL HERNIA: ICD-10-CM

## 2024-06-03 RX ORDER — FAMOTIDINE 20 MG/1
20 TABLET, FILM COATED ORAL 2 TIMES DAILY PRN
Qty: 60 TABLET | Refills: 1 | Status: SHIPPED | OUTPATIENT
Start: 2024-06-03

## 2024-06-03 NOTE — TELEPHONE ENCOUNTER
Last office visit: 5/3/2024  Last Refill: 5/3/2024  Return To Clinic: 5/1/2024  Protocol: pass  Medication Quantity Refills Start End   famotidine 20 MG Oral Tab 60 tablet 0 5/3/2024 --   Sig:   Take 1 tablet (20 mg total) by mouth 2 (two) times daily as needed for Heartburn.     Route:   Oral

## 2024-06-27 ENCOUNTER — LAB ENCOUNTER (OUTPATIENT)
Dept: LAB | Age: 69
End: 2024-06-27
Attending: STUDENT IN AN ORGANIZED HEALTH CARE EDUCATION/TRAINING PROGRAM
Payer: MEDICARE

## 2024-06-27 DIAGNOSIS — N28.9 FUNCTION KIDNEY DECREASED: ICD-10-CM

## 2024-06-27 LAB
ALBUMIN SERPL-MCNC: 3.6 G/DL (ref 3.4–5)
ALBUMIN/GLOB SERPL: 1 {RATIO} (ref 1–2)
ALP LIVER SERPL-CCNC: 105 U/L
ALT SERPL-CCNC: 25 U/L
ANION GAP SERPL CALC-SCNC: 6 MMOL/L (ref 0–18)
AST SERPL-CCNC: 14 U/L (ref 15–37)
BILIRUB SERPL-MCNC: 0.5 MG/DL (ref 0.1–2)
BUN BLD-MCNC: 13 MG/DL (ref 9–23)
CALCIUM BLD-MCNC: 9.3 MG/DL (ref 8.5–10.1)
CHLORIDE SERPL-SCNC: 109 MMOL/L (ref 98–112)
CO2 SERPL-SCNC: 25 MMOL/L (ref 21–32)
CREAT BLD-MCNC: 1.01 MG/DL
EGFRCR SERPLBLD CKD-EPI 2021: 60 ML/MIN/1.73M2 (ref 60–?)
FASTING STATUS PATIENT QL REPORTED: YES
GLOBULIN PLAS-MCNC: 3.5 G/DL (ref 2.8–4.4)
GLUCOSE BLD-MCNC: 115 MG/DL (ref 70–99)
OSMOLALITY SERPL CALC.SUM OF ELEC: 291 MOSM/KG (ref 275–295)
POTASSIUM SERPL-SCNC: 4.4 MMOL/L (ref 3.5–5.1)
PROT SERPL-MCNC: 7.1 G/DL (ref 6.4–8.2)
SODIUM SERPL-SCNC: 140 MMOL/L (ref 136–145)

## 2024-06-27 PROCEDURE — 80053 COMPREHEN METABOLIC PANEL: CPT

## 2024-07-09 ENCOUNTER — PATIENT MESSAGE (OUTPATIENT)
Dept: FAMILY MEDICINE CLINIC | Facility: CLINIC | Age: 69
End: 2024-07-09

## 2024-07-09 NOTE — TELEPHONE ENCOUNTER
From: Jan Babb  To: Obdulia Waldrop  Sent: 7/9/2024 4:15 PM CDT  Subject: Blood sugar    When I last saw you, stated that you wanted to place me on metformin. I decided to give it a go. So you can call it in for me, I would appreciate it. thank you Jan Babb [birth year]

## 2024-08-03 DIAGNOSIS — K44.9 HIATAL HERNIA: ICD-10-CM

## 2024-08-05 RX ORDER — FAMOTIDINE 20 MG/1
20 TABLET, FILM COATED ORAL 2 TIMES DAILY PRN
Qty: 60 TABLET | Refills: 0 | Status: SHIPPED | OUTPATIENT
Start: 2024-08-05

## 2024-08-20 ENCOUNTER — OFFICE VISIT (OUTPATIENT)
Facility: LOCATION | Age: 69
End: 2024-08-20
Payer: MEDICARE

## 2024-08-20 VITALS
TEMPERATURE: 96 F | WEIGHT: 189 LBS | HEART RATE: 79 BPM | RESPIRATION RATE: 16 BRPM | BODY MASS INDEX: 31.49 KG/M2 | HEIGHT: 65 IN | OXYGEN SATURATION: 97 %

## 2024-08-20 DIAGNOSIS — K40.20 BILATERAL INGUINAL HERNIA WITHOUT OBSTRUCTION OR GANGRENE, RECURRENCE NOT SPECIFIED: Primary | ICD-10-CM

## 2024-08-20 PROCEDURE — 99212 OFFICE O/P EST SF 10 MIN: CPT | Performed by: STUDENT IN AN ORGANIZED HEALTH CARE EDUCATION/TRAINING PROGRAM

## 2024-08-20 RX ORDER — EVOLOCUMAB 140 MG/ML
140 INJECTION, SOLUTION SUBCUTANEOUS
COMMUNITY
Start: 2024-08-20 | End: 2024-09-19

## 2024-08-20 NOTE — PROGRESS NOTES
Patient ID: Jan Babb is a 69 year old female.    Chief Complaint   Patient presents with    Hernia     C/o hernia ,follow up after cardiogy evaluation ok for surgery          HPI: Jan Babb is a 69 year old female presents to clinic for follow-up.  Since last clinic appointment, she reports continued discomfort in her groins bilaterally.  She has been cleared by cardiology.  Patient had Cologuard screening which was negative.    Workup: None      Past Medical History  Past Medical History:    Anxiety state    Cataract    COPD (chronic obstructive pulmonary disease) (HCC)    Coronary atherosclerosis    Deep vein thrombosis (HCC)    STENT LEFT LEG    Generalized anxiety disorder with panic attacks    Heart murmur    High blood pressure    High cholesterol    Hx of motion sickness    Osteoarthritis    Other chest pain    Sleep apnea    CPAP    Visual impairment    GLASSES       Past Surgical History  Past Surgical History:   Procedure Laterality Date    Back surgery      Bypass surgery      Cabg  2022    Cataract  2021    right and left with toric     Cath bare metal stent (bms)  2023    X2    Cholecystectomy      Colonoscopy      Excis lumbar disk,one level      Hysterectomy      total hyseterectomy and oophorecomty at age 24.           Other  2022    Lipoma    Other surgical history      back surgery l5s1 fusion in , then screw removed, which caussed spinal fluid leak which was fixed after three surgeries four year aso     Spine surgery procedure unlisted      4 back surgeries    Tonsillectomy         Medications  Current Outpatient Medications   Medication Sig Dispense Refill    Evolocumab (REPATHA SURECLICK) 140 MG/ML Subcutaneous Solution Auto-injector Inject 140 mg into the skin every 14 (fourteen) days.      FAMOTIDINE 20 MG Oral Tab TAKE ONE TABLET BY MOUTH TWICE DAILY AS NEEDED FOR HEARTBURN 60 tablet 0    clotrimazole-betamethasone 1-0.05 % External Cream Apply 1 Application  topically 2 (two) times daily. Apply to affected area. 45 g 0    ezetimibe 10 MG Oral Tab Take 1 tablet (10 mg total) by mouth nightly.      rivaroxaban (XARELTO) 10 MG Oral Tab Take 1 tablet (10 mg total) by mouth.      clopidogrel 75 MG Oral Tab Take 1 tablet (75 mg total) by mouth daily. 30 tablet 2    furosemide (LASIX) 20 MG Oral Tab Take 0.5 tablets (10 mg total) by mouth daily as needed.      metoprolol succinate ER 25 MG Oral Tablet 24 Hr Take 1 tablet (25 mg total) by mouth daily.         Allergies  Allergies   Allergen Reactions    Moxifloxacin Hcl In Nacl SHORTNESS OF BREATH    Statins OTHER (SEE COMMENTS)     Bone pain     Venlafaxine OTHER (SEE COMMENTS)     Unknown     Ketorolac Tromethamine ANXIETY    Pregabalin OTHER (SEE COMMENTS)    Trazodone OTHER (SEE COMMENTS)    Amitriptyline FATIGUE    Risperidone NAUSEA ONLY       Social History  History   Smoking Status    Former    Types: Cigarettes   Smokeless Tobacco    Never     History   Alcohol Use Never     History   Drug Use Unknown       Family History  Family History   Problem Relation Age of Onset    Lipids Father     Lipids Mother     No Known Problems Daughter     No Known Problems Son        Review of Systems  Review of Systems   Constitutional: Negative.    Respiratory: Negative.     Cardiovascular: Negative.    Gastrointestinal:  Negative for abdominal distention, abdominal pain, constipation, nausea and vomiting.       Exam  Vitals:    08/20/24 1444   Pulse: 79   Resp: 16   Temp: (!) 96.3 °F (35.7 °C)     Physical Exam  Constitutional:       Appearance: Normal appearance.   Cardiovascular:      Rate and Rhythm: Normal rate and regular rhythm.   Pulmonary:      Effort: Pulmonary effort is normal.      Breath sounds: Normal breath sounds.   Abdominal:      General: Abdomen is flat. There is no distension.      Palpations: Abdomen is soft.      Tenderness: There is no abdominal tenderness.      Hernia: A hernia is present.       Skin:      General: Skin is warm and dry.   Neurological:      Mental Status: She is alert and oriented to person, place, and time.           Assessment/Plan  Assessment   Problem List Items Addressed This Visit    None  Visit Diagnoses       Bilateral inguinal hernia without obstruction or gangrene, recurrence not specified    -  Primary    Relevant Orders    Dr. Collier's Surgical Case Request (do not use for updates/changes within 48 hrs of procedure)            Jan Babb is a 69 year old female with bilateral inguinal hernias, umbilical hernia    We will plan for bilateral inguinal hernia repair with mesh and umbilical hernia repair at the same time  Procedure explained to the patient, including the use of mesh in her groins bilaterally and possible no mesh in a primary repair at the umbilicus  Risk of the procedure include bleeding, pain, infection, risk of recurrence, and need for further intervention  Patient acknowledged understanding and wishes to proceed    Patient has significant cardiac history  She was cleared for surgery by cardiology  Recommendations: Stop Plavix 5 days before surgery and start ASA 81; stop Xarelto 2 days before; restart Plavix and Xarelto when appropriate and stop ASA when starting Plavix    Patient will undergo medical clearance as well    If she has any questions or concerns, she is to contact my office      Halley Collier MD  General Surgery  [facility]     CC:  Obdulia Waldrop MD

## 2024-08-22 ENCOUNTER — OFFICE VISIT (OUTPATIENT)
Dept: FAMILY MEDICINE CLINIC | Facility: CLINIC | Age: 69
End: 2024-08-22
Payer: MEDICARE

## 2024-08-22 VITALS
TEMPERATURE: 97 F | WEIGHT: 181 LBS | HEIGHT: 65 IN | DIASTOLIC BLOOD PRESSURE: 60 MMHG | BODY MASS INDEX: 30.16 KG/M2 | HEART RATE: 60 BPM | SYSTOLIC BLOOD PRESSURE: 108 MMHG | RESPIRATION RATE: 16 BRPM

## 2024-08-22 DIAGNOSIS — R73.03 PREDIABETES: Primary | ICD-10-CM

## 2024-08-22 DIAGNOSIS — N18.31 STAGE 3A CHRONIC KIDNEY DISEASE (HCC): ICD-10-CM

## 2024-08-22 PROCEDURE — G2211 COMPLEX E/M VISIT ADD ON: HCPCS | Performed by: STUDENT IN AN ORGANIZED HEALTH CARE EDUCATION/TRAINING PROGRAM

## 2024-08-22 PROCEDURE — 99213 OFFICE O/P EST LOW 20 MIN: CPT | Performed by: STUDENT IN AN ORGANIZED HEALTH CARE EDUCATION/TRAINING PROGRAM

## 2024-08-22 NOTE — PROGRESS NOTES
[facility] Family Medicine Note  24    No chief complaint on file.    HPI:   Jan Babb is a 69 year old female who presents for follow up.    Lab Results   Component Value Date    A1C 6.0 (H) 2024    A1C 6.1 (H) 2024    A1C 6.1 (H) 2022      Saw cardiology and was cleared for surgery.     CPAP was fixed, has new mask and it fits better. Has upcoming breathing test.    Going to have three hernias repaired. Umbilical hernia, bilateral inguinal hernia repair.    Taking famotidine 40mg BID.    Stopped having coke and pepsi. Stopped having hamburger with the bread.    Redid rocking chair for her daughter-in-law who is expecting in 2024.     Wt Readings from Last 6 Encounters:   24 181 lb (82.1 kg)   24 189 lb (85.7 kg)   24 189 lb 6.4 oz (85.9 kg)   24 196 lb (88.9 kg)   24 196 lb (88.9 kg)   23 185 lb (83.9 kg)     Past Medical History:    Anxiety state    Cataract    COPD (chronic obstructive pulmonary disease) (HCC)    Coronary atherosclerosis    Deep vein thrombosis (HCC)    STENT LEFT LEG    Generalized anxiety disorder with panic attacks    Heart murmur    High blood pressure    High cholesterol    Hx of motion sickness    Osteoarthritis    Other chest pain    Sleep apnea    CPAP    Visual impairment    GLASSES     Past Surgical History:   Procedure Laterality Date    Back surgery      Bypass surgery      Cabg  2022    Cataract  2021    right and left with toric     Cath bare metal stent (bms)  2023    X2    Cholecystectomy      Colonoscopy      Excis lumbar disk,one level      Hysterectomy      total hyseterectomy and oophorecomty at age 24.           Other  2022    Lipoma    Other surgical history      back surgery l5s1 fusion in , then screw removed, which caussed spinal fluid leak which was fixed after three surgeries four year aso     Spine surgery procedure unlisted      4 back surgeries     Tonsillectomy       Allergies   Allergen Reactions    Moxifloxacin Hcl In Nacl SHORTNESS OF BREATH    Statins OTHER (SEE COMMENTS)     Bone pain     Venlafaxine OTHER (SEE COMMENTS)     Unknown     Ketorolac Tromethamine ANXIETY    Pregabalin OTHER (SEE COMMENTS)    Trazodone OTHER (SEE COMMENTS)    Amitriptyline FATIGUE    Risperidone NAUSEA ONLY      FAMOTIDINE 20 MG Oral Tab TAKE ONE TABLET BY MOUTH TWICE DAILY AS NEEDED FOR HEARTBURN 60 tablet 0    clotrimazole-betamethasone 1-0.05 % External Cream Apply 1 Application topically 2 (two) times daily. Apply to affected area. 45 g 0    ezetimibe 10 MG Oral Tab Take 1 tablet (10 mg total) by mouth nightly.      rivaroxaban (XARELTO) 10 MG Oral Tab Take 1 tablet (10 mg total) by mouth.      clopidogrel 75 MG Oral Tab Take 1 tablet (75 mg total) by mouth daily. 30 tablet 2    metoprolol succinate ER 25 MG Oral Tablet 24 Hr Take 1 tablet (25 mg total) by mouth daily.      furosemide (LASIX) 20 MG Oral Tab Take 0.5 tablets (10 mg total) by mouth daily as needed. Patient taking 2 pills 2x/day       Social History     Socioeconomic History    Marital status:    Tobacco Use    Smoking status: Former     Current packs/day: 0.00     Types: Cigarettes     Quit date:      Years since quitting: 10.6    Smokeless tobacco: Never    Tobacco comments:     quit 10 years ago   Vaping Use    Vaping status: Never Used   Substance and Sexual Activity    Alcohol use: Never    Drug use: Never   Other Topics Concern    Caffeine Concern No    Exercise Yes     Comment: walk 3 days a week     Counseling given: Not Answered  Tobacco comments: quit 10 years ago    Family History   Problem Relation Age of Onset    Lipids Father     Lipids Mother     No Known Problems Daughter     No Known Problems Son      Family Status   Relation Status    Fa  at age  of old age at 73     Mo  at age  at age 88     Shekhar Alive    Son Alive    MGMA     MGFA     PGMA      PGFA         REVIEW OF SYSTEMS:   See HPI    EXAM:   /60   Pulse 60   Temp 97 °F (36.1 °C) (Temporal)   Resp 16   Ht 5' 5\" (1.651 m)   Wt 181 lb (82.1 kg)   BMI 30.12 kg/m²  Estimated body mass index is 30.12 kg/m² as calculated from the following:    Height as of this encounter: 5' 5\" (1.651 m).    Weight as of this encounter: 181 lb (82.1 kg).   Vital signs reviewed. Appears stated age, well groomed.  Physical Exam:  GEN:  Patient is alert and oriented x3, no apparent distress  HEAD:  Normocephalic, atraumatic  HEENT:  no scleral icterus, conjunctivae clear bilaterally, EOMI, PERRLA, OP clear  LUNGS: clear to auscultation bilaterally, no rales/rhonchi/wheezing  HEART:  Regular rate and rhythm, normal S1/S2, no murmurs, rubs or gallops  EXTREMITIES:  Moves all extremities well  NEURO:  CN 2 - 12 grossly intact, gait normal    ASSESSMENT AND PLAN:   1. Prediabetes  Patient presents for follow up of prediabetes  - focus on low carbohydrate diet  - avoid having carbs \"plain\" - always pair carbs with protein or healthy fat  - increase exercise  - will repeat A1c in 3mo  - Hemoglobin A1C [E]; Future    2. Stage 3a chronic kidney disease (HCC)  Stable, will continue to follow        Meds & Refills for this Visit:  Requested Prescriptions      No prescriptions requested or ordered in this encounter           Health Maintenance:  Health Maintenance Due   Topic Date Due    COVID-19 Vaccine ( season) 2023    [place] Annual Health Assessment  2024    Fall Risk Screening (Annual)  2024       Patient/Caregiver Education: There are no barriers to learning. Medical education done.   Outcome: Patient verbalizes understanding. Patient is notified to call with any questions, complications, allergies, or worsening or changing symptoms.  Patient is to call with any side effects or complications from the treatments as a result of today.     Problem List:  Patient Active Problem  List   Diagnosis    Post-menopausal    Dyslipidemia    High triglycerides    High serum low-density lipoprotein (LDL)    Arthralgia of left acromioclavicular joint    Cervical disc disease    Heart murmur    Lumbar disc disease    Chronic back pain    COPD (chronic obstructive pulmonary disease) (HCC)    Generalized anxiety disorder with panic attacks    Inflammatory spondylopathy, unspecified spinal region (HCC)    Neck mass    Deep vein thrombosis (DVT) of left lower extremity, unspecified chronicity, unspecified vein (HCC)    Anemia    Hyperglycemia    Acute deep vein thrombosis (DVT) of femoral vein of left lower extremity (HCC)    Tobacco use disorder    Hospital discharge follow-up    Current use of long term anticoagulation    CAROLYN on CPAP    Atherosclerosis of aorta (HCC)    Coronary artery disease of native artery of native heart with stable angina pectoris (HCC)    Ectatic abdominal aorta (HCC)    Opioid type dependence, abuse (HCC)    Stage 3a chronic kidney disease (HCC)    Thrombophilia (HCC)    Pulmonary emphysema (HCC)    Fatty liver    Hiatal hernia    Non-recurrent bilateral inguinal hernia without obstruction or gangrene    Prediabetes       Return in about 3 months (around 11/22/2024) for Medicare Annual Wellness Visit, med check.    Obdulia Waldrop MD  [facility]  08/22/24      Please note that portions of this note may have been completed with a voice recognition program. Efforts were made to edit the dictations but occasionally words are mis-transcribed. Thank you for your understanding.

## 2024-08-28 DIAGNOSIS — K44.9 HIATAL HERNIA: ICD-10-CM

## 2024-08-28 RX ORDER — FAMOTIDINE 20 MG/1
20 TABLET, FILM COATED ORAL 2 TIMES DAILY PRN
Qty: 60 TABLET | Refills: 0 | Status: SHIPPED | OUTPATIENT
Start: 2024-08-28

## 2024-08-30 ENCOUNTER — TELEPHONE (OUTPATIENT)
Facility: LOCATION | Age: 69
End: 2024-08-30

## 2024-08-30 NOTE — TELEPHONE ENCOUNTER
Pulmonary Clearance received from Bro Coleman MD. Per note \"pt is at mild increased risk for planned hernia surgery with Dr. Collier due to underlying CAROLYN and Asthma, these risks include but are not limited to : prolonged mechanical ventilation, tracheotomy, and death.  Pt requires no further testing from a pulmonary standpoint.\"  Faxed to preadmission testing and placed in binder.

## 2024-09-06 ENCOUNTER — TELEPHONE (OUTPATIENT)
Facility: LOCATION | Age: 69
End: 2024-09-06

## 2024-09-06 NOTE — TELEPHONE ENCOUNTER
ZACARIAS SUAREZ Patient  Member ID  [id number]    Date of Birth  1955-04-25    Gender  Female    Eligibility Status  Active Coverage    Group Number  [id number]    Plan / Coverage Date  2022-01-01    Transaction Type  Outpatient Authorization    Organization  [facility]    Payer  [employer] (COMMERCIAL & MEDICARE)    [employer] logo  Transaction ID: Not FoundCustomer ID: [id number] Date: NA  No Authorization Required  Place of Service  [facility]    Service From - To Date  NA    Admission Type  9    Diagnosis Code 1   - Unil inguinal hernia w/o obst or gangr not spcf as recur    Diagnosis Code 2  K429 - Umbilical hernia without obstruction or gangrene    Procedure Code 1  30940    Quantity  1 Units    Procedure From - To Date  2024-09-19    Status  NO AUTH REQUIRED    Message  No precert required. The requested service may not be eligible for coverage . Refer to the online Clinical Policy Bulletins or the Provider Code Search Tool on [employer] website. You may also contact provider services using the Precert number on the member id card.  [id number]    Procedure Code 2  97069    Quantity  1 Units    Procedure From - To Date  2024-09-19    Status  NO AUTH REQUIRED    Message  No precert required. The requested service may not be eligible for coverage . Refer to the online Clinical Policy Bulletins or the Provider Code Search Tool on [employer] website. You may also contact provider services using the Precert number on the member id card.  [id number]

## 2024-09-09 PROBLEM — Z01.818 PRE-OP TESTING: Status: ACTIVE | Noted: 2024-09-09

## 2024-09-09 RX ORDER — TIZANIDINE HYDROCHLORIDE 2 MG/1
2 CAPSULE, GELATIN COATED ORAL 2 TIMES DAILY PRN
COMMUNITY
Start: 2024-08-23

## 2024-09-10 ENCOUNTER — LABORATORY ENCOUNTER (OUTPATIENT)
Dept: LAB | Age: 69
End: 2024-09-10
Attending: STUDENT IN AN ORGANIZED HEALTH CARE EDUCATION/TRAINING PROGRAM
Payer: MEDICARE

## 2024-09-10 DIAGNOSIS — Z01.818 PRE-OP TESTING: ICD-10-CM

## 2024-09-10 LAB
ANION GAP SERPL CALC-SCNC: 11 MMOL/L (ref 0–18)
APTT PPP: 41.8 SECONDS (ref 23–36)
BUN BLD-MCNC: 8 MG/DL (ref 9–23)
CALCIUM BLD-MCNC: 10 MG/DL (ref 8.7–10.4)
CHLORIDE SERPL-SCNC: 109 MMOL/L (ref 98–112)
CO2 SERPL-SCNC: 23 MMOL/L (ref 21–32)
CREAT BLD-MCNC: 0.96 MG/DL
EGFRCR SERPLBLD CKD-EPI 2021: 64 ML/MIN/1.73M2 (ref 60–?)
ERYTHROCYTE [DISTWIDTH] IN BLOOD BY AUTOMATED COUNT: 13.2 %
FASTING STATUS PATIENT QL REPORTED: YES
GLUCOSE BLD-MCNC: 95 MG/DL (ref 70–99)
HCT VFR BLD AUTO: 40.2 %
HGB BLD-MCNC: 13.3 G/DL
INR BLD: 2.16 (ref 0.8–1.2)
MCH RBC QN AUTO: 28.7 PG (ref 26–34)
MCHC RBC AUTO-ENTMCNC: 33.1 G/DL (ref 31–37)
MCV RBC AUTO: 86.6 FL
OSMOLALITY SERPL CALC.SUM OF ELEC: 294 MOSM/KG (ref 275–295)
PLATELET # BLD AUTO: 221 10(3)UL (ref 150–450)
POTASSIUM SERPL-SCNC: 4.2 MMOL/L (ref 3.5–5.1)
PROTHROMBIN TIME: 24.3 SECONDS (ref 11.6–14.8)
RBC # BLD AUTO: 4.64 X10(6)UL
SODIUM SERPL-SCNC: 143 MMOL/L (ref 136–145)
WBC # BLD AUTO: 5.6 X10(3) UL (ref 4–11)

## 2024-09-10 PROCEDURE — 80048 BASIC METABOLIC PNL TOTAL CA: CPT

## 2024-09-10 PROCEDURE — 85730 THROMBOPLASTIN TIME PARTIAL: CPT

## 2024-09-10 PROCEDURE — 85610 PROTHROMBIN TIME: CPT

## 2024-09-10 PROCEDURE — 85027 COMPLETE CBC AUTOMATED: CPT

## 2024-09-18 ENCOUNTER — ANESTHESIA EVENT (OUTPATIENT)
Dept: SURGERY | Facility: HOSPITAL | Age: 69
End: 2024-09-18
Payer: MEDICARE

## 2024-09-18 NOTE — PAT NURSING NOTE
Called ,cardiologist office and spoke to Sean,medical assistant to advise that written anticoagulation instructions have not been received.She did note that a request was sent on 9/17/2024 for this but response not received.She will resend request with urgent reply requested

## 2024-09-18 NOTE — ANESTHESIA PREPROCEDURE EVALUATION
PRE-OP EVALUATION    Patient Name: Jan Babb    Admit Diagnosis: Bilateral inguinal hernia without obstruction or gangrene, recurrence not specified [K40.20]    Pre-op Diagnosis: Bilateral inguinal hernia without obstruction or gangrene, recurrence not specified [K40.20]    BILATERAL INGUINAL HERNIA REPAIR WITH MESH PLUG AND UMBILICAL HERNIORRHAPHY    Anesthesia Procedure: BILATERAL INGUINAL HERNIA REPAIR WITH MESH PLUG AND UMBILICAL HERNIORRHAPHY    Surgeons and Role:     * Halley Collier MD - Primary    Pre-op vitals reviewed.  Temp: 98.9 °F (37.2 °C)  Pulse: 59  Resp: 16  BP: 137/81  SpO2: 100 %  Body mass index is 30.12 kg/m².    Current medications reviewed.  Hospital Medications:   acetaminophen (Tylenol Extra Strength) tab 1,000 mg  1,000 mg Oral Once    lactated ringers infusion   Intravenous Continuous    ceFAZolin (Ancef) 2g in 10mL IV syringe premix  2 g Intravenous Once    ceFAZolin (Ancef) 2 g/10mL IV syringe premix           Outpatient Medications:     Medications Prior to Admission   Medication Sig Dispense Refill Last Dose    famotidine 40 MG Oral Tab Take 1 tablet (40 mg total) by mouth 2 (two) times daily as needed for Heartburn.   9/18/2024 at 1900    aspirin 81 MG Oral Tab EC Take 1 tablet (81 mg total) by mouth daily.   9/19/2024 at 0600    tiZANidine HCl 2 MG Oral Cap Take 1 capsule (2 mg total) by mouth 2 (two) times daily as needed for Muscle spasms.   9/18/2024 at 1900    Evolocumab (REPATHA SURECLICK) 140 MG/ML Subcutaneous Solution Auto-injector Inject 140 mg into the skin every 14 (fourteen) days.   9/5/2024    rivaroxaban (XARELTO) 10 MG Oral Tab Take 1 tablet (10 mg total) by mouth daily with food.   9/16/2024    clopidogrel 75 MG Oral Tab Take 1 tablet (75 mg total) by mouth daily. 30 tablet 2 9/13/2024    metoprolol succinate ER 25 MG Oral Tablet 24 Hr Take 1 tablet (25 mg total) by mouth daily.   9/19/2024 at 0600    furosemide (LASIX) 20 MG Oral Tab Take 0.5 tablets (10 mg  total) by mouth daily as needed.   More than a month       Allergies: Moxifloxacin hcl in nacl, Statins, Venlafaxine, Ketorolac tromethamine, Pregabalin, Trazodone, Amitriptyline, and Risperidone      Anesthesia Evaluation    Patient summary reviewed.    Anesthetic Complications  (-) history of anesthetic complications         GI/Hepatic/Renal  Comment: Bilateral inguinal hernia for open repair with mesh                               Cardiovascular  Comment: CAD  s/p CABG . Had chest pain . Underwent cath which revealed atretic LIMA, had PCI to LAD. Has been angina free since. Cardiology note on chart from Dr. Cruz    ECG reviewed.            (+) hypertension   (+) hyperlipidemia  (+) CAD    (+) CABG/stent                            Endo/Other                                  Pulmonary  Comment: Increased risk of pulmonary complication from Dr MARK Cramer in pulmonary evaluation preop.      (+) COPD and moderate           (+) sleep apnea and CPAP      Neuro/Psych  Comment: Lumbar disc disease      (+) anxiety         (+) neuromuscular disease                     Past Surgical History:   Procedure Laterality Date    Back surgery      Bypass surgery      Cabg  2022    Cataract  2021    right and left with toric     Cath bare metal stent (bms)  2023    X2    Cholecystectomy      Colonoscopy      Excis lumbar disk,one level      Hysterectomy      total hyseterectomy and oophorecomty at age 24.           Other  2022    Lipoma    Other surgical history      back surgery l5s1 fusion in , then screw removed, which caussed spinal fluid leak which was fixed after three surgeries four year aso     Spine surgery procedure unlisted      4 back surgeries    Tonsillectomy       Social History     Socioeconomic History    Marital status:    Tobacco Use    Smoking status: Former     Current packs/day: 0.00     Types: Cigarettes     Quit date:      Years since quitting: 10.7    Smokeless  tobacco: Never    Tobacco comments:     quit 10 years ago   Vaping Use    Vaping status: Never Used   Substance and Sexual Activity    Alcohol use: Never    Drug use: Never   Other Topics Concern    Caffeine Concern No    Exercise Yes     Comment: walk 3 days a week     History   Drug Use Unknown     Available pre-op labs reviewed.  Lab Results   Component Value Date    WBC 5.6 09/10/2024    RBC 4.64 09/10/2024    HGB 13.3 09/10/2024    HCT 40.2 09/10/2024    MCV 86.6 09/10/2024    MCH 28.7 09/10/2024    MCHC 33.1 09/10/2024    RDW 13.2 09/10/2024    .0 09/10/2024     Lab Results   Component Value Date     09/10/2024    K 4.2 09/10/2024     09/10/2024    CO2 23.0 09/10/2024    BUN 8 (L) 09/10/2024    CREATSERUM 0.96 09/10/2024    GLU 95 09/10/2024    CA 10.0 09/10/2024     Lab Results   Component Value Date    INR 2.16 (H) 09/10/2024         Airway      Mallampati: II  Mouth opening: >3 FB  TM distance: > 6 cm  Neck ROM: full Cardiovascular    Cardiovascular exam normal.  Rhythm: regular  Rate: normal  (-) murmur   Dental      Dental appliance(s): lower dentures and upper dentures       Pulmonary    Pulmonary exam normal.  Breath sounds clear to auscultation bilaterally.               Other findings              ASA: 3   Plan: general  NPO status verified and patient meets guidelines.        Comment: GA with OETT/LMA explained to patient. Risks/benefits explained including but not limited to sore throat, hoarse voice, nausea, dental trauma, eye injury,pulmonary complication and other complications as discussed in anesthesia consent form. Patient agrees to proceed. Anesthesia consent signed.     Plan/risks discussed with: patient and spouse                Present on Admission:  **None**

## 2024-09-19 ENCOUNTER — HOSPITAL ENCOUNTER (OUTPATIENT)
Facility: HOSPITAL | Age: 69
Discharge: HOME | End: 2024-09-20
Attending: STUDENT IN AN ORGANIZED HEALTH CARE EDUCATION/TRAINING PROGRAM | Admitting: STUDENT IN AN ORGANIZED HEALTH CARE EDUCATION/TRAINING PROGRAM
Payer: MEDICARE

## 2024-09-19 ENCOUNTER — ANESTHESIA (OUTPATIENT)
Dept: SURGERY | Facility: HOSPITAL | Age: 69
End: 2024-09-19
Payer: MEDICARE

## 2024-09-19 DIAGNOSIS — K40.20 NON-RECURRENT BILATERAL INGUINAL HERNIA WITHOUT OBSTRUCTION OR GANGRENE: Primary | ICD-10-CM

## 2024-09-19 DIAGNOSIS — Z01.818 PRE-OP TESTING: ICD-10-CM

## 2024-09-19 DIAGNOSIS — K42.9 UMBILICAL HERNIA WITHOUT OBSTRUCTION AND WITHOUT GANGRENE: ICD-10-CM

## 2024-09-19 PROBLEM — I25.10 CORONARY ARTERY DISEASE INVOLVING NATIVE CORONARY ARTERY OF NATIVE HEART WITHOUT ANGINA PECTORIS: Status: ACTIVE | Noted: 2022-07-29

## 2024-09-19 PROBLEM — I10 BENIGN ESSENTIAL HTN: Status: ACTIVE | Noted: 2024-09-19

## 2024-09-19 LAB
INR BLD: 1.05 (ref 0.8–1.2)
PROTHROMBIN TIME: 13.8 SECONDS (ref 11.6–14.8)

## 2024-09-19 PROCEDURE — 99205 OFFICE O/P NEW HI 60 MIN: CPT | Performed by: INTERNAL MEDICINE

## 2024-09-19 PROCEDURE — 0WQF0ZZ REPAIR ABDOMINAL WALL, OPEN APPROACH: ICD-10-PCS | Performed by: STUDENT IN AN ORGANIZED HEALTH CARE EDUCATION/TRAINING PROGRAM

## 2024-09-19 PROCEDURE — 0YUA0JZ SUPPLEMENT BILATERAL INGUINAL REGION WITH SYNTHETIC SUBSTITUTE, OPEN APPROACH: ICD-10-PCS | Performed by: STUDENT IN AN ORGANIZED HEALTH CARE EDUCATION/TRAINING PROGRAM

## 2024-09-19 DEVICE — BARD MESH PERFIX PLUG, MEDIUM
Type: IMPLANTABLE DEVICE | Site: INGUINAL | Status: FUNCTIONAL
Brand: BARD MESH PERFIX PLUG

## 2024-09-19 DEVICE — BARD MESH PERFIX PLUG, LARGE
Type: IMPLANTABLE DEVICE | Site: INGUINAL | Status: FUNCTIONAL
Brand: BARD MESH PERFIX PLUG

## 2024-09-19 RX ORDER — HYDROCODONE BITARTRATE AND ACETAMINOPHEN 5; 325 MG/1; MG/1
2 TABLET ORAL EVERY 4 HOURS PRN
Status: DISCONTINUED | OUTPATIENT
Start: 2024-09-19 | End: 2024-09-20

## 2024-09-19 RX ORDER — HYDROMORPHONE HYDROCHLORIDE 1 MG/ML
0.4 INJECTION, SOLUTION INTRAMUSCULAR; INTRAVENOUS; SUBCUTANEOUS EVERY 5 MIN PRN
Status: DISCONTINUED | OUTPATIENT
Start: 2024-09-19 | End: 2024-09-19 | Stop reason: HOSPADM

## 2024-09-19 RX ORDER — HYDROMORPHONE HYDROCHLORIDE 1 MG/ML
0.2 INJECTION, SOLUTION INTRAMUSCULAR; INTRAVENOUS; SUBCUTANEOUS EVERY 2 HOUR PRN
Status: DISCONTINUED | OUTPATIENT
Start: 2024-09-19 | End: 2024-09-20

## 2024-09-19 RX ORDER — METOPROLOL SUCCINATE 25 MG/1
25 TABLET, EXTENDED RELEASE ORAL
Status: DISCONTINUED | OUTPATIENT
Start: 2024-09-20 | End: 2024-09-20

## 2024-09-19 RX ORDER — METOCLOPRAMIDE HYDROCHLORIDE 5 MG/ML
10 INJECTION INTRAMUSCULAR; INTRAVENOUS EVERY 8 HOURS PRN
Status: DISCONTINUED | OUTPATIENT
Start: 2024-09-19 | End: 2024-09-20

## 2024-09-19 RX ORDER — SODIUM PHOSPHATE, DIBASIC AND SODIUM PHOSPHATE, MONOBASIC 7; 19 G/230ML; G/230ML
1 ENEMA RECTAL ONCE AS NEEDED
Status: DISCONTINUED | OUTPATIENT
Start: 2024-09-19 | End: 2024-09-20

## 2024-09-19 RX ORDER — ASPIRIN 81 MG/1
81 TABLET ORAL DAILY
Status: DISCONTINUED | OUTPATIENT
Start: 2024-09-20 | End: 2024-09-20

## 2024-09-19 RX ORDER — HYDROCODONE BITARTRATE AND ACETAMINOPHEN 5; 325 MG/1; MG/1
1 TABLET ORAL EVERY 4 HOURS PRN
Status: DISCONTINUED | OUTPATIENT
Start: 2024-09-19 | End: 2024-09-20

## 2024-09-19 RX ORDER — HYDROMORPHONE HYDROCHLORIDE 1 MG/ML
0.4 INJECTION, SOLUTION INTRAMUSCULAR; INTRAVENOUS; SUBCUTANEOUS EVERY 2 HOUR PRN
Status: DISCONTINUED | OUTPATIENT
Start: 2024-09-19 | End: 2024-09-20

## 2024-09-19 RX ORDER — ACETAMINOPHEN 10 MG/ML
INJECTION, SOLUTION INTRAVENOUS
Status: COMPLETED
Start: 2024-09-19 | End: 2024-09-19

## 2024-09-19 RX ORDER — ACETAMINOPHEN 500 MG
1000 TABLET ORAL ONCE AS NEEDED
Status: DISCONTINUED | OUTPATIENT
Start: 2024-09-19 | End: 2024-09-19 | Stop reason: HOSPADM

## 2024-09-19 RX ORDER — HEPARIN SODIUM 5000 [USP'U]/ML
5000 INJECTION, SOLUTION INTRAVENOUS; SUBCUTANEOUS EVERY 8 HOURS SCHEDULED
Status: DISCONTINUED | OUTPATIENT
Start: 2024-09-19 | End: 2024-09-20

## 2024-09-19 RX ORDER — BUPIVACAINE HYDROCHLORIDE AND EPINEPHRINE 5; 5 MG/ML; UG/ML
INJECTION, SOLUTION EPIDURAL; INTRACAUDAL; PERINEURAL AS NEEDED
Status: DISCONTINUED | OUTPATIENT
Start: 2024-09-19 | End: 2024-09-19 | Stop reason: HOSPADM

## 2024-09-19 RX ORDER — ONDANSETRON 2 MG/ML
INJECTION INTRAMUSCULAR; INTRAVENOUS
Status: COMPLETED
Start: 2024-09-19 | End: 2024-09-19

## 2024-09-19 RX ORDER — NALOXONE HYDROCHLORIDE 0.4 MG/ML
0.08 INJECTION, SOLUTION INTRAMUSCULAR; INTRAVENOUS; SUBCUTANEOUS AS NEEDED
Status: DISCONTINUED | OUTPATIENT
Start: 2024-09-19 | End: 2024-09-19 | Stop reason: HOSPADM

## 2024-09-19 RX ORDER — METOCLOPRAMIDE HYDROCHLORIDE 5 MG/ML
10 INJECTION INTRAMUSCULAR; INTRAVENOUS EVERY 8 HOURS PRN
Status: DISCONTINUED | OUTPATIENT
Start: 2024-09-19 | End: 2024-09-19 | Stop reason: HOSPADM

## 2024-09-19 RX ORDER — HYDROMORPHONE HYDROCHLORIDE 1 MG/ML
0.2 INJECTION, SOLUTION INTRAMUSCULAR; INTRAVENOUS; SUBCUTANEOUS EVERY 5 MIN PRN
Status: DISCONTINUED | OUTPATIENT
Start: 2024-09-19 | End: 2024-09-19 | Stop reason: HOSPADM

## 2024-09-19 RX ORDER — ASPIRIN 81 MG/1
81 TABLET ORAL DAILY
Status: ON HOLD | COMMUNITY
End: 2024-09-20

## 2024-09-19 RX ORDER — ROCURONIUM BROMIDE 10 MG/ML
INJECTION, SOLUTION INTRAVENOUS AS NEEDED
Status: DISCONTINUED | OUTPATIENT
Start: 2024-09-19 | End: 2024-09-19 | Stop reason: SURG

## 2024-09-19 RX ORDER — ONDANSETRON 2 MG/ML
4 INJECTION INTRAMUSCULAR; INTRAVENOUS EVERY 6 HOURS PRN
Status: DISCONTINUED | OUTPATIENT
Start: 2024-09-19 | End: 2024-09-19 | Stop reason: HOSPADM

## 2024-09-19 RX ORDER — ONDANSETRON 2 MG/ML
INJECTION INTRAMUSCULAR; INTRAVENOUS AS NEEDED
Status: DISCONTINUED | OUTPATIENT
Start: 2024-09-19 | End: 2024-09-19 | Stop reason: SURG

## 2024-09-19 RX ORDER — FAMOTIDINE 20 MG/1
40 TABLET, FILM COATED ORAL 2 TIMES DAILY PRN
Status: DISCONTINUED | OUTPATIENT
Start: 2024-09-19 | End: 2024-09-20

## 2024-09-19 RX ORDER — SODIUM CHLORIDE, SODIUM LACTATE, POTASSIUM CHLORIDE, CALCIUM CHLORIDE 600; 310; 30; 20 MG/100ML; MG/100ML; MG/100ML; MG/100ML
INJECTION, SOLUTION INTRAVENOUS CONTINUOUS
Status: DISCONTINUED | OUTPATIENT
Start: 2024-09-19 | End: 2024-09-20

## 2024-09-19 RX ORDER — HYDROMORPHONE HYDROCHLORIDE 1 MG/ML
0.6 INJECTION, SOLUTION INTRAMUSCULAR; INTRAVENOUS; SUBCUTANEOUS EVERY 5 MIN PRN
Status: DISCONTINUED | OUTPATIENT
Start: 2024-09-19 | End: 2024-09-19 | Stop reason: HOSPADM

## 2024-09-19 RX ORDER — LABETALOL HYDROCHLORIDE 5 MG/ML
INJECTION, SOLUTION INTRAVENOUS AS NEEDED
Status: DISCONTINUED | OUTPATIENT
Start: 2024-09-19 | End: 2024-09-19 | Stop reason: SURG

## 2024-09-19 RX ORDER — SODIUM CHLORIDE, SODIUM LACTATE, POTASSIUM CHLORIDE, CALCIUM CHLORIDE 600; 310; 30; 20 MG/100ML; MG/100ML; MG/100ML; MG/100ML
INJECTION, SOLUTION INTRAVENOUS CONTINUOUS
Status: DISCONTINUED | OUTPATIENT
Start: 2024-09-19 | End: 2024-09-19 | Stop reason: HOSPADM

## 2024-09-19 RX ORDER — HYDROCODONE BITARTRATE AND ACETAMINOPHEN 5; 325 MG/1; MG/1
2 TABLET ORAL ONCE AS NEEDED
Status: DISCONTINUED | OUTPATIENT
Start: 2024-09-19 | End: 2024-09-19 | Stop reason: HOSPADM

## 2024-09-19 RX ORDER — SENNOSIDES 8.6 MG
17.2 TABLET ORAL NIGHTLY PRN
Status: DISCONTINUED | OUTPATIENT
Start: 2024-09-19 | End: 2024-09-20

## 2024-09-19 RX ORDER — HYDROMORPHONE HYDROCHLORIDE 1 MG/ML
0.8 INJECTION, SOLUTION INTRAMUSCULAR; INTRAVENOUS; SUBCUTANEOUS EVERY 2 HOUR PRN
Status: DISCONTINUED | OUTPATIENT
Start: 2024-09-19 | End: 2024-09-20

## 2024-09-19 RX ORDER — TRAMADOL HYDROCHLORIDE 50 MG/1
50 TABLET ORAL EVERY 6 HOURS PRN
Qty: 10 TABLET | Refills: 0 | Status: SHIPPED | OUTPATIENT
Start: 2024-09-19 | End: 2024-09-23

## 2024-09-19 RX ORDER — LIDOCAINE HYDROCHLORIDE 10 MG/ML
INJECTION, SOLUTION EPIDURAL; INFILTRATION; INTRACAUDAL; PERINEURAL AS NEEDED
Status: DISCONTINUED | OUTPATIENT
Start: 2024-09-19 | End: 2024-09-19 | Stop reason: SURG

## 2024-09-19 RX ORDER — ACETAMINOPHEN 500 MG
1000 TABLET ORAL ONCE
Status: DISCONTINUED | OUTPATIENT
Start: 2024-09-19 | End: 2024-09-19 | Stop reason: HOSPADM

## 2024-09-19 RX ORDER — ACETAMINOPHEN 10 MG/ML
1000 INJECTION, SOLUTION INTRAVENOUS ONCE
Status: COMPLETED | OUTPATIENT
Start: 2024-09-19 | End: 2024-09-19

## 2024-09-19 RX ORDER — HYDROMORPHONE HYDROCHLORIDE 1 MG/ML
INJECTION, SOLUTION INTRAMUSCULAR; INTRAVENOUS; SUBCUTANEOUS
Status: COMPLETED
Start: 2024-09-19 | End: 2024-09-19

## 2024-09-19 RX ORDER — ONDANSETRON 2 MG/ML
4 INJECTION INTRAMUSCULAR; INTRAVENOUS EVERY 6 HOURS PRN
Status: DISCONTINUED | OUTPATIENT
Start: 2024-09-19 | End: 2024-09-20

## 2024-09-19 RX ORDER — ONDANSETRON 4 MG/1
4 TABLET, ORALLY DISINTEGRATING ORAL EVERY 4 HOURS PRN
Qty: 10 TABLET | Refills: 0 | Status: SHIPPED | OUTPATIENT
Start: 2024-09-19

## 2024-09-19 RX ORDER — LABETALOL HYDROCHLORIDE 5 MG/ML
5 INJECTION, SOLUTION INTRAVENOUS EVERY 10 MIN PRN
Status: DISCONTINUED | OUTPATIENT
Start: 2024-09-19 | End: 2024-09-19 | Stop reason: HOSPADM

## 2024-09-19 RX ORDER — HYDROCODONE BITARTRATE AND ACETAMINOPHEN 5; 325 MG/1; MG/1
1 TABLET ORAL ONCE AS NEEDED
Status: DISCONTINUED | OUTPATIENT
Start: 2024-09-19 | End: 2024-09-19 | Stop reason: HOSPADM

## 2024-09-19 RX ORDER — LIDOCAINE HYDROCHLORIDE 10 MG/ML
INJECTION, SOLUTION INFILTRATION; PERINEURAL AS NEEDED
Status: DISCONTINUED | OUTPATIENT
Start: 2024-09-19 | End: 2024-09-19 | Stop reason: HOSPADM

## 2024-09-19 RX ORDER — POLYETHYLENE GLYCOL 3350 17 G/17G
17 POWDER, FOR SOLUTION ORAL DAILY PRN
Status: DISCONTINUED | OUTPATIENT
Start: 2024-09-19 | End: 2024-09-20

## 2024-09-19 RX ORDER — FAMOTIDINE 40 MG/1
40 TABLET, FILM COATED ORAL 2 TIMES DAILY PRN
COMMUNITY

## 2024-09-19 RX ORDER — ACETAMINOPHEN 325 MG/1
650 TABLET ORAL EVERY 4 HOURS PRN
Status: DISCONTINUED | OUTPATIENT
Start: 2024-09-19 | End: 2024-09-20

## 2024-09-19 RX ORDER — BISACODYL 10 MG
10 SUPPOSITORY, RECTAL RECTAL
Status: DISCONTINUED | OUTPATIENT
Start: 2024-09-19 | End: 2024-09-20

## 2024-09-19 RX ADMIN — LABETALOL HYDROCHLORIDE 10 MG: 5 INJECTION, SOLUTION INTRAVENOUS at 11:45:00

## 2024-09-19 RX ADMIN — SODIUM CHLORIDE, SODIUM LACTATE, POTASSIUM CHLORIDE, CALCIUM CHLORIDE: 600; 310; 30; 20 INJECTION, SOLUTION INTRAVENOUS at 13:14:00

## 2024-09-19 RX ADMIN — LABETALOL HYDROCHLORIDE 5 MG: 5 INJECTION, SOLUTION INTRAVENOUS at 11:49:00

## 2024-09-19 RX ADMIN — SODIUM CHLORIDE, SODIUM LACTATE, POTASSIUM CHLORIDE, CALCIUM CHLORIDE: 600; 310; 30; 20 INJECTION, SOLUTION INTRAVENOUS at 11:45:00

## 2024-09-19 RX ADMIN — ONDANSETRON 4 MG: 2 INJECTION INTRAMUSCULAR; INTRAVENOUS at 10:13:00

## 2024-09-19 RX ADMIN — ROCURONIUM BROMIDE 40 MG: 10 INJECTION, SOLUTION INTRAVENOUS at 10:07:00

## 2024-09-19 RX ADMIN — ROCURONIUM BROMIDE 10 MG: 10 INJECTION, SOLUTION INTRAVENOUS at 11:25:00

## 2024-09-19 RX ADMIN — ROCURONIUM BROMIDE 10 MG: 10 INJECTION, SOLUTION INTRAVENOUS at 10:30:00

## 2024-09-19 RX ADMIN — SODIUM CHLORIDE, SODIUM LACTATE, POTASSIUM CHLORIDE, CALCIUM CHLORIDE: 600; 310; 30; 20 INJECTION, SOLUTION INTRAVENOUS at 09:59:00

## 2024-09-19 RX ADMIN — SODIUM CHLORIDE, SODIUM LACTATE, POTASSIUM CHLORIDE, CALCIUM CHLORIDE: 600; 310; 30; 20 INJECTION, SOLUTION INTRAVENOUS at 12:51:00

## 2024-09-19 RX ADMIN — ROCURONIUM BROMIDE 10 MG: 10 INJECTION, SOLUTION INTRAVENOUS at 12:50:00

## 2024-09-19 RX ADMIN — LIDOCAINE HYDROCHLORIDE 50 MG: 10 INJECTION, SOLUTION EPIDURAL; INFILTRATION; INTRACAUDAL; PERINEURAL at 10:07:00

## 2024-09-19 RX ADMIN — ROCURONIUM BROMIDE 10 MG: 10 INJECTION, SOLUTION INTRAVENOUS at 12:14:00

## 2024-09-19 NOTE — PROGRESS NOTES
S/p Bilateral Inguinal Hernia Repair w/ mesh plug & umbilical herniorrhaphy  Admitted by cart.  Bed in Low position.  Oriented to room.   Spouse at bedside .  Denies need for pain meds at this time .

## 2024-09-19 NOTE — PROGRESS NOTES
Skin assessment done w/ Aamir RN, skin intact. No skin breakdown noted, able to reposition in bed independently.

## 2024-09-19 NOTE — CONSULTS
[place] [facility]  CONSULT     Jan Babb Patient Status:  Outpatient in a Bed    1955 MRN [id number]   Location [facility] 3NW-A Attending Halley Collier MD   Hosp Day # 0 PCP Obdulia Waldrop MD     Reason for consult: med management    Requested by: Dr. Hartley    Subjective:   History of Present Illness:     Jan Babb is a 69 year old female with PMhx of anxiety, COPD, CAD, HTN, DL presents for bilateral inguinal hernia repair. Pt underwent procedure this afternoon and tolerated well. Pain controlled with meds. Pt denies any flatus or BM. No N/V/D/C or abd pain. No F/C. No CP or SOB. Pt started on regular diet.     History/Other:    Past Medical History:  Past Medical History:    Anxiety state    Cataract    COPD (chronic obstructive pulmonary disease) (HCC)    Coronary atherosclerosis    Deep vein thrombosis (HCC)    STENT LEFT LEG    Generalized anxiety disorder with panic attacks    Heart murmur    High blood pressure    High cholesterol    Hx of motion sickness    Osteoarthritis    Other chest pain    Sleep apnea    CPAP    Thrombophilia (HCC)    Visual impairment    GLASSES     Past Surgical History:   Past Surgical History:   Procedure Laterality Date    Back surgery      Bypass surgery      Cabg  2022    Cataract  2021    right and left with toric     Cath bare metal stent (bms)  2023    X2    Cholecystectomy      Colonoscopy      Excis lumbar disk,one level      Hysterectomy      total hyseterectomy and oophorecomty at age 24.           Other  2022    Lipoma    Other surgical history      back surgery l5s1 fusion in , then screw removed, which caussed spinal fluid leak which was fixed after three surgeries four year aso     Spine surgery procedure unlisted      4 back surgeries    Tonsillectomy        Family History:   Family History   Problem Relation Age of Onset    Lipids Father     Lipids Mother     No Known Problems Daughter     No Known Problems  Son      Social History:    reports that she quit smoking about 10 years ago. Her smoking use included cigarettes. She has never used smokeless tobacco. She reports that she does not drink alcohol and does not use drugs.     Allergies:   Allergies   Allergen Reactions    Moxifloxacin Hcl In Nacl SHORTNESS OF BREATH    Statins OTHER (SEE COMMENTS)     Bone pain     Venlafaxine OTHER (SEE COMMENTS)     Unknown     Ketorolac Tromethamine ANXIETY    Pregabalin OTHER (SEE COMMENTS)    Trazodone OTHER (SEE COMMENTS)    Amitriptyline FATIGUE    Risperidone NAUSEA ONLY       Medications:    No current facility-administered medications on file prior to encounter.     Current Outpatient Medications on File Prior to Encounter   Medication Sig Dispense Refill    famotidine 40 MG Oral Tab Take 1 tablet (40 mg total) by mouth 2 (two) times daily as needed for Heartburn.      aspirin 81 MG Oral Tab EC Take 1 tablet (81 mg total) by mouth daily.      tiZANidine HCl 2 MG Oral Cap Take 1 capsule (2 mg total) by mouth 2 (two) times daily as needed for Muscle spasms.      Evolocumab (REPATHA SURECLICK) 140 MG/ML Subcutaneous Solution Auto-injector Inject 140 mg into the skin every 14 (fourteen) days.      rivaroxaban (XARELTO) 10 MG Oral Tab Take 1 tablet (10 mg total) by mouth daily with food.      clopidogrel 75 MG Oral Tab Take 1 tablet (75 mg total) by mouth daily. 30 tablet 2    metoprolol succinate ER 25 MG Oral Tablet 24 Hr Take 1 tablet (25 mg total) by mouth daily.      furosemide (LASIX) 20 MG Oral Tab Take 0.5 tablets (10 mg total) by mouth daily as needed.         Review of Systems:   A comprehensive review of systems was completed.    Pertinent positives and negatives noted in the HPI.    Objective:   Physical Exam:    /66 (BP Location: Right arm)   Pulse 79   Temp 98.4 °F (36.9 °C) (Oral)   Resp 19   Ht 165.1 cm (5' 5\")   Wt 181 lb (82.1 kg)   SpO2 96%   BMI 30.12 kg/m²   General: No acute distress,  Alert  Respiratory: No rhonchi, no wheezes  Cardiovascular: S1, S2. Regular rate and rhythm  Abdomen: Soft, incision pain, incision CDI  Neuro: No new focal deficits  Extremities: No edema      Results:    Labs:      Labs Last 24 Hours:  Recent Labs   Lab 09/19/24  0824   INR 1.05       No results for input(s): \"GLU\", \"BUN\", \"CREATSERUM\", \"GFRAA\", \"GFRNAA\", \"CA\", \"ALB\", \"NA\", \"K\", \"CL\", \"CO2\", \"ALKPHO\", \"AST\", \"ALT\", \"BILT\", \"TP\" in the last 168 hours.    Recent Labs   Lab 09/19/24  0824   PTP 13.8   INR 1.05       No results for input(s): \"TROP\", \"CK\" in the last 168 hours.      Imaging: Imaging data reviewed in Epic.    Assessment & Plan:      #Bilateral inguinal hernia s/p repair  S/p bilateral inguinal hernia repaid with mesh plug and umbilical herniorrhaphy  Per Surgery  Encourage ambulation  Pain control  On regular diet    #Ess HTN  Resume home meds    #CAD  Continue ASA, BB  Resume plavix/xarelto when ok with surgery        Plan of care discussed with patient, Ajay Amaro MD  9/19/2024    The 21st Century Cures Act makes medical notes like these available to patients in the interest of transparency. Please be advised this is a medical document. Medical documents are intended to carry relevant information, facts as evident, and the clinical opinion of the practitioner. The medical note is intended as peer to peer communication and may appear blunt or direct. It is written in medical language and may contain abbreviations or verbiage that are unfamiliar.

## 2024-09-19 NOTE — OPERATIVE REPORT
OPERATIVE NOTE    Jan Babb Location: OR   CSN [id number] MRN [id number]   Admission Date 9/19/2024 Operation Date 9/19/2024   Attending Physician Halley Collier MD Operating Physician Halley Collier MD     PREOPERATIVE DIAGNOSIS  Bilateral inguinal hernias  Umbilical hernia  CAD status post CABG x 2  DVT status post thrombectomy and IVC filter, on Xarelto  Iliac vein stenosis status post stent, on Plavix  COPD    POSTOPERATIVE DIAGNOSIS  Bilateral indirect inguinal hernias  Umbilical hernia  CAD status post CABG x 2  DVT status post thrombectomy and IVC filter, on Xarelto  Iliac vein stenosis status post stent, on Plavix  COPD    PROCEDURE PERFORMED  Bilateral open inguinal hernia repair with mesh  Inguinal nerve block  Umbilical hernia repair, primary    SURGEON  Halley Collier MD    ASSISTANTS  ROB Winkler her assistance was necessary in the repair of the left inguinal hernia  ROB Kemp her assistance was necessary in the repair of the right inguinal hernia    ANESTHESIA  General    FINDINGS   Bilateral direct inguinal hernias with preservation of the round ligament, ligation of the ilioinguinal nerve on the left but preservation on the right, 1 cm umbilical hernia repaired with suture    INDICATIONS  This is a 69 year old female who presented to outpatient clinic with bilateral inguinal hernia. Patient has pain with activity, denies any nausea or vomiting. Repair was indicated.  Patient was also found to have a small umbilical hernia on imaging.  She wished to have repaired.  Patient has a significant history of CAD status post CABG x 2.  She also has significant history of DVT status post thrombectomy, IVC filter, and iliac vein stent placement for stenosis.  She has been on Xarelto as well as Plavix but held these in preparation for surgery today.  Patient also has severe COPD requiring CPAP at night.  The risks and benefits of the surgery were discussed previously with the patient in  the outpatient setting.  She consented to surgery.    DESCRIPTION OF PROCEDURE  After informed consent, patient was brought to the operating room and placed in supine position. Bilateral SCDs were placed and pre-operative antibiotics administered. Anesthesia was induced without any complication. Patient was prepped and draped in the usual sterile fashion. Time out was performed confirming patient, procedure, and site.    I first started with bilateral inguinal hernia repairs.  Each side was repaired in the exact same way.  The ASIS and pubic tubercle were both identified. Proposed line of incision was marked. Inguinal nerve block was performed. Local was then infiltrated in the tissues of proposed incision. Using 15 blade, incision was made. Subcutaneous tissues, including Conor's, were divided until the external oblique aponeurosis was identified. The external ring was identified. Using 15 blade, stab incision was made in the aponeurosis along the plane of external ring. Filley was used to divide the aponeurosis all the way through the external ring, making sure to open the canal. The lateral aspect of the inguinal canal was dissected medially until the pubic tubercle was palpated. The same was repeated for the medial aspect until both sides were free. Using right angle, the inguinal contents were brought up and tagged with 1/4” penrose.  The round ligament was identified and preserved.  There was no sac identified but preperitoneal fat could be seen herniating along the path of the round ligament, confirming an indirect hernia.     I placed a plug and patch mesh, a large plug for the left side and a medium plug for the right side. The plug was inserted into defect. The external leaflets were sutured to 4 points including the conjoint tendon medially and laterally, the inguinal ligament and pubic tubercle with o vicryl. The patch was then cut to size and placed. Patch was anchored at the pubic tubercle, shelving  edge and conjoint tendon. The legs were wrapped around the round ligament and sutured together at the crotch, re-creating the internal ring. Hemostasis achieved. External aponeurosis was identified, and closed using running 2-o vicryl. Conor's was then closed using running 3-o vicryl.  Incision was closed with 3-0 Vicryl deep dermal and 4-o monocryl running subcuticular on skin. Wound was cleaned and dressed with dermabond.    On the left side, during dissecting of the inguinal canal, the ilioinguinal nerve was ligated.  The ilioinguinal nerve was preserved on the right side.  Palpation of both sides revealed no gaps confirming an adequate repair prior to closure.  The inguinal hernias were repaired, I turned my attention to the umbilical hernia.    Using a blade, a 2 cm supraumbilical incision was made.  Tissues were dissected down until fascia was encountered.  The umbilical stalk was circumferentially dissected until hemostat was able to come around the stalk.  Hernia sac was dissected from the umbilicus and resected.  Surrounding fascia was exposed for later repair.  Defect was approximately 1 cm.  We decided to do a primary closure.  Using 0 Ethibond suture, 1 figure-of-eight suture was placed.  Defect was completely closed on palpation. Wound was irrigated and hemostasis achieved.  Umbilicus was tacked down to the fascia with a 2-0 Vicryl.  Incision was then closed in layers with 3-0 Vicryl deep dermal and a 4-0 Monocryl running subcuticular.  Wound was cleaned and dressed with Dermabond.    At the end of the case, all counts were correct. Patient tolerated procedure well. Patient was awakened and transferred to PACU in a hemodynamically stable condition.    SPECIMENS REMOVED  None    ESTIMATED BLOOD LOSS  20 ml    COMPLICATIONS  None     Halley Collier MD

## 2024-09-19 NOTE — ANESTHESIA PROCEDURE NOTES
Airway  Date/Time: 9/19/2024 10:10 AM  Urgency: elective    Airway not difficult    General Information and Staff    Patient location during procedure: OR  Anesthesiologist: Lul Price MD  Performed: anesthesiologist   Performed by: Lul Price MD  Authorized by: Lul Price MD      Indications and Patient Condition  Indications for airway management: anesthesia  Sedation level: deep  Preoxygenated: yes  Patient position: sniffing  Mask difficulty assessment: 1 - vent by mask    Final Airway Details  Final airway type: endotracheal airway      Successful airway: ETT  Cuffed: yes   Successful intubation technique: direct laryngoscopy  Endotracheal tube insertion site: oral  Blade: Swetha  Blade size: #3  ETT size (mm): 7.0    Cormack-Lehane Classification: grade I - full view of glottis  Placement verified by: capnometry   Measured from: lips  ETT to lips (cm): 21  Number of attempts at approach: 1

## 2024-09-19 NOTE — H&P
The above referenced H&P was reviewed by Halley Collier MD on 2024, the patient was examined and no significant changes have occurred in the patient's condition since the H&P was performed.  Risks, benefits, alternative treatments and consequences of no treatment were discussed.  We will proceed with procedure as planned.    Plavix and xarelto held, patient only taking ASA 81mg. Will proceed as scheduled. All questions answered.    Halley Collier MD  2024  7:53 AM      Patient ID: Jan Babb is a 69 year old female.    Chief Complaint   Patient presents with    Hernia     C/o hernia ,follow up after cardiogy evaluation ok for surgery          HPI: Jan Babb is a 69 year old female presents to clinic for follow-up.  Since last clinic appointment, she reports continued discomfort in her groins bilaterally.  She has been cleared by cardiology.  Patient had Cologuard screening which was negative.    Workup: None      Past Medical History  Past Medical History:    Anxiety state    Cataract    COPD (chronic obstructive pulmonary disease) (HCC)    Coronary atherosclerosis    Deep vein thrombosis (HCC)    STENT LEFT LEG    Generalized anxiety disorder with panic attacks    Heart murmur    High blood pressure    High cholesterol    Hx of motion sickness    Osteoarthritis    Other chest pain    Sleep apnea    CPAP    Visual impairment    GLASSES       Past Surgical History  Past Surgical History:   Procedure Laterality Date    Back surgery      Bypass surgery      Cabg  2022    Cataract  2021    right and left with toric     Cath bare metal stent (bms)  2023    X2    Cholecystectomy      Colonoscopy      Excis lumbar disk,one level      Hysterectomy      total hyseterectomy and oophorecomty at age 24.           Other  2022    Lipoma    Other surgical history      back surgery l5s1 fusion in , then screw removed, which caussed spinal fluid leak which was fixed after three surgeries four  year aso     Spine surgery procedure unlisted      4 back surgeries    Tonsillectomy         Medications  Current Outpatient Medications   Medication Sig Dispense Refill    Evolocumab (REPATHA SURECLICK) 140 MG/ML Subcutaneous Solution Auto-injector Inject 140 mg into the skin every 14 (fourteen) days.      FAMOTIDINE 20 MG Oral Tab TAKE ONE TABLET BY MOUTH TWICE DAILY AS NEEDED FOR HEARTBURN 60 tablet 0    clotrimazole-betamethasone 1-0.05 % External Cream Apply 1 Application topically 2 (two) times daily. Apply to affected area. 45 g 0    ezetimibe 10 MG Oral Tab Take 1 tablet (10 mg total) by mouth nightly.      rivaroxaban (XARELTO) 10 MG Oral Tab Take 1 tablet (10 mg total) by mouth.      clopidogrel 75 MG Oral Tab Take 1 tablet (75 mg total) by mouth daily. 30 tablet 2    furosemide (LASIX) 20 MG Oral Tab Take 0.5 tablets (10 mg total) by mouth daily as needed.      metoprolol succinate ER 25 MG Oral Tablet 24 Hr Take 1 tablet (25 mg total) by mouth daily.         Allergies  Allergies   Allergen Reactions    Moxifloxacin Hcl In Nacl SHORTNESS OF BREATH    Statins OTHER (SEE COMMENTS)     Bone pain     Venlafaxine OTHER (SEE COMMENTS)     Unknown     Ketorolac Tromethamine ANXIETY    Pregabalin OTHER (SEE COMMENTS)    Trazodone OTHER (SEE COMMENTS)    Amitriptyline FATIGUE    Risperidone NAUSEA ONLY       Social History  History   Smoking Status    Former    Types: Cigarettes   Smokeless Tobacco    Never     History   Alcohol Use Never     History   Drug Use Unknown       Family History  Family History   Problem Relation Age of Onset    Lipids Father     Lipids Mother     No Known Problems Daughter     No Known Problems Son        Review of Systems  Review of Systems   Constitutional: Negative.    Respiratory: Negative.     Cardiovascular: Negative.    Gastrointestinal:  Negative for abdominal distention, abdominal pain, constipation, nausea and vomiting.       Exam  Vitals:    08/20/24 1444   Pulse: 79   Resp:  16   Temp: (!) 96.3 °F (35.7 °C)     Physical Exam  Constitutional:       Appearance: Normal appearance.   Cardiovascular:      Rate and Rhythm: Normal rate and regular rhythm.   Pulmonary:      Effort: Pulmonary effort is normal.      Breath sounds: Normal breath sounds.   Abdominal:      General: Abdomen is flat. There is no distension.      Palpations: Abdomen is soft.      Tenderness: There is no abdominal tenderness.      Hernia: A hernia is present.       Skin:     General: Skin is warm and dry.   Neurological:      Mental Status: She is alert and oriented to person, place, and time.           Assessment/Plan  Assessment   Problem List Items Addressed This Visit    None  Visit Diagnoses       Bilateral inguinal hernia without obstruction or gangrene, recurrence not specified    -  Primary    Relevant Orders    Dr. Collier's Surgical Case Request (do not use for updates/changes within 48 hrs of procedure)            Jan Babb is a 69 year old female with bilateral inguinal hernias, umbilical hernia    We will plan for bilateral inguinal hernia repair with mesh and umbilical hernia repair at the same time  Procedure explained to the patient, including the use of mesh in her groins bilaterally and possible no mesh in a primary repair at the umbilicus  Risk of the procedure include bleeding, pain, infection, risk of recurrence, and need for further intervention  Patient acknowledged understanding and wishes to proceed    Patient has significant cardiac history  She was cleared for surgery by cardiology  Recommendations: Stop Plavix 5 days before surgery and start ASA 81; stop Xarelto 2 days before; restart Plavix and Xarelto when appropriate and stop ASA when starting Plavix    Patient will undergo medical clearance as well    If she has any questions or concerns, she is to contact my office      Halley Collier MD  General Surgery  [facility]     CC:  Obdulia Waldrop MD

## 2024-09-19 NOTE — DISCHARGE INSTRUCTIONS
Hernia Repair  Dr. Halley Collier    MEDICATIONS  For post-operative pain control, the medications are usually tramadol.  This is a narcotic and is best taken by starting with one tablet and repeating every four to six hours as needed.  If the patient does not feel they need the narcotics they shouldn’t take them.  If the pain is severe the patient may take up to two pills every six hours.  The patient can take tylenol 1g three times a day and ibuprofen 400-600mg in between up to 4 times a day as needed for pain as well.  The patient can take zofran 4mg every 6 hours as needed for nausea. The patient can also take miralax or colace as needed for constipation. Please restart xarelto on Tuesday of next week (9/24) and restart Plavix on Thursday of next week (9/26). Continue aspirin 81 mg until Wednesday (9/25) and stop when you restart Plavix.    DIET  The patient may resume a general diet immediately.  This is not a good time to eat excessively.  The patient should eat in moderation and stick with foods the patient feels are easy to digest. There should be no alcohol consumption in the immediate recovery time period or within six hours of taking narcotics.    WOUND CARE  The dressing is usually a dissolvable glue which protects the wound. The patient can take a shower starting the day after surgery, but please, no baths or soaking. Please do not put any creams or ointments on the surgical incisions. If the patient does have a top dressing with clear tape and gauze, this dressing may be removed in 2 days. Do not remove the steri-strips or butterfly tapes that are white and adherent to the skin.  The steri-strips will eventually peel up at the ends and at this point they may be removed.  This is usually seven to ten days after surgery.  When showering, soap can get on the wounds but do not scrub over the wounds.  No hair dye or chemicals of any kind should get in the wounds.  Avoid tub baths, swimming or sitting in a  hot tub for two weeks.  If visible sutures or staples are present they will be removed in the office by the surgeon or nurse.  Most wounds will be closed with dissolving suture underneath the skin.  These sutures will dissolve on their own.  If a drain is present make sure the patient receives drain care instructions from the nurse prior to discharge.  Most patients will not have a drain.    ACTIVITY  Every day the patient should be up, showered and dressed.  Each day the patient should be up and around the house.  The patient should not lie in bed and should not stay in pajamas.  We count on the patient being up, coughing, walking and deep breathing to avoid pneumonia and blood clots in the legs.  Once a day the patient should get out of the house and go shopping, go to the mall, the [employer] store, the movies or a restaurant.  The patient may ride in a car but should not drive the car for at least one week.  Patients should be off narcotics for at least 8 hours prior to being a .  The average time off work is 10 to 14 days; most adults will be seeing the surgeon prior to returning to work.  Students will return to school within 1-5 days after discharge from the hospital but will be off gym, sports, and indoors for recess for one month.  Patients may go up and down stairs and lift up to five pounds but no bending, pushing or pulling.  Nothing called work or exercise until the follow up visit.  No ‘stair-master’, power walking, jogging or workouts until the follow up visit.  Patients should seek further activity limits at the time of their appointment.    APPOINTMENT  Please call our office for an appointment within five to ten days of discharge.  Any fever greater than 100.5, chills, nausea, vomiting, or severe diarrhea please call our office.  If the wound turns red, hot, swollen, becomes increasingly painful, or drains pus call us immediately at [phone].  For life threatening emergencies call [phone].   For non-emergent care please call our office after 8:30 a.m. Monday through Friday.  The number listed above is our office number; our phone automatically switches to our answering service if we are not there.  Please do not use the emergency room for non-urgent care.    Thank you for entrusting us with your care.  [facility]--General Surgery

## 2024-09-19 NOTE — ANESTHESIA POSTPROCEDURE EVALUATION
[facility]    Jan Babb Patient Status:  Outpatient in a Bed   Age/Gender 69 year old female MRN [id number]   Location [facility] SURGERY Attending Halley Collier MD   Hosp Day # 0 PCP Obdulia Waldrop MD       Anesthesia Post-op Note    BILATERAL INGUINAL HERNIA REPAIR WITH MESH PLUG AND UMBILICAL HERNIORRHAPHY    Procedure Summary       Date: 09/19/24 Room / Location:  MAIN OR 03 / [facility] MAIN OR    Anesthesia Start: 0959 Anesthesia Stop: 1314    Procedure: BILATERAL INGUINAL HERNIA REPAIR WITH MESH PLUG AND UMBILICAL HERNIORRHAPHY (Abdomen) Diagnosis:       Bilateral inguinal hernia without obstruction or gangrene, recurrence not specified      (Bilateral inguinal hernia without obstruction or gangrene, recurrence not specified [K40.20])    Surgeons: Halley Collier MD Anesthesiologist: Lul Price MD    Anesthesia Type: general ASA Status: 3            Anesthesia Type: general    Vitals Value Taken Time   /77 09/19/24 1315   Temp 97.4 09/19/24 1315   Pulse 87 09/19/24 1315   Resp 18 09/19/24 1315   SpO2 99 09/19/24 1315       Patient Location: PACU    Anesthesia Type: general    Airway Patency: patent and extubated    Postop Pain Control: adequate    Mental Status: mildly sedated but able to meaningfully participate in the post-anesthesia evaluation    Nausea/Vomiting: none    Cardiopulmonary/Hydration status: stable euvolemic    Complications: no apparent anesthesia related complications    Postop vital signs: stable    Dental Exam: Unchanged from Preop    Patient to be discharged from PACU when criteria met.

## 2024-09-20 VITALS
SYSTOLIC BLOOD PRESSURE: 155 MMHG | BODY MASS INDEX: 30.16 KG/M2 | WEIGHT: 181 LBS | HEIGHT: 65 IN | RESPIRATION RATE: 18 BRPM | TEMPERATURE: 98 F | DIASTOLIC BLOOD PRESSURE: 74 MMHG | HEART RATE: 61 BPM | OXYGEN SATURATION: 98 %

## 2024-09-20 RX ORDER — CLOPIDOGREL BISULFATE 75 MG/1
75 TABLET ORAL DAILY
Status: SHIPPED | COMMUNITY
Start: 2024-09-24

## 2024-09-20 RX ORDER — ASPIRIN 81 MG/1
81 TABLET ORAL DAILY
Status: SHIPPED | COMMUNITY
Start: 2024-09-24

## 2024-09-20 RX ORDER — POLYETHYLENE GLYCOL 3350 17 G/17G
17 POWDER, FOR SOLUTION ORAL DAILY PRN
Qty: 30 EACH | Refills: 0 | Status: SHIPPED | OUTPATIENT
Start: 2024-09-20

## 2024-09-20 NOTE — PLAN OF CARE
Patient has complaints of headache, states never has headaches. Pressure on left side. Also complaints of nausea. Tylenol and zofran given. Hospitalist paged for update.     1300 - patient feels better, headache has resolved.    detailed exam

## 2024-09-20 NOTE — PROGRESS NOTES
[facility]  Progress Note    Jan Babb Patient Status:  Outpatient in a Bed    1955 MRN [id number]   Location [facility] 3NW-A Attending Halley Collier MD   Hosp Day # 0 PCP Obdulia Waldrop MD     Subjective:  The patient was seen and examined at bedside.  She states her abdominal pain today is improved from yesterday.  She has been icing the incision sites and has some swelling and bruising.  She is tolerating a diet without nausea or vomiting.  She has passed small amounts of flatus, but has not had a bowel movement.  She states she is belching.    Objective/Physical Exam:  /66 (BP Location: Right arm)   Pulse 60   Temp 98 °F (36.7 °C) (Oral)   Resp 18   Ht 65\"   Wt 181 lb (82.1 kg)   SpO2 98%   BMI 30.12 kg/m²     Intake/Output Summary (Last 24 hours) at 2024 0906  Last data filed at 2024 0742  Gross per 24 hour   Intake 2110 ml   Output 1595 ml   Net 515 ml         General: Alert, oriented x3. No acute distress.  HEENT: Normocephalic, atraumatic. No scleral icterus.  Pulmonary: No respiratory distress, effort normal.   Abdomen: Non-distended, without tympany to percussion. Soft, incisional site tenderness to palpation. No rebound or guarding. No peritoneal signs.   Incision: Umbilical and bilateral groin incisions are clean, dry, intact.  They do have surrounding ecchymosis and mild edema.  Extremities: No lower extremity edema. No clubbing or cyanosis.   Skin: Warm, dry. No jaundice.       Labs:      Lab Results   Component Value Date    INR 1.05 2024    INR 2.16 (H) 09/10/2024    INR 1.31 (H) 2022             Assessment:  Patient Active Problem List   Diagnosis    Post-menopausal    Dyslipidemia    High triglycerides    High serum low-density lipoprotein (LDL)    Arthralgia of left acromioclavicular joint    Cervical disc disease    Heart murmur    Lumbar disc disease    Chronic back pain    COPD (chronic obstructive pulmonary disease) (HCC)     Generalized anxiety disorder with panic attacks    Inflammatory spondylopathy, unspecified spinal region (HCC)    Neck mass    Deep vein thrombosis (DVT) of left lower extremity, unspecified chronicity, unspecified vein (HCC)    Anemia    Hyperglycemia    Acute deep vein thrombosis (DVT) of femoral vein of left lower extremity (HCC)    Tobacco use disorder    Hospital discharge follow-up    Current use of long term anticoagulation    CAROLYN on CPAP    Atherosclerosis of aorta (HCC)    Coronary artery disease involving native coronary artery of native heart without angina pectoris    Ectatic abdominal aorta (HCC)    Opioid type dependence, abuse (HCC)    Stage 3a chronic kidney disease (HCC)    Thrombophilia (HCC)    Pulmonary emphysema (HCC)    Fatty liver    Hiatal hernia    Non-recurrent bilateral inguinal hernia without obstruction or gangrene    Prediabetes    Pre-op testing    Benign essential HTN     POD 1 bilateral open inguinal hernia repair with mesh and primary umbilical hernia repair    Plan:  Plan for discharge home today  General Diet  Antiemetics and analgesics as needed  Encourage ambulation and up to chair  Activity restrictions reviewed  The patient may resume blood thinners next Tuesday, 9/24/24.  The patient may shower  DVT prophylaxis with heparin  GI prophylaxis Pepcid  Follow-up with [facility] General Surgery in 2 weeks      The patient was discussed with Dr. Collier , and she is in agreement with the assessment and plan. My total face time with this patient was 25 minutes. Greater than half of the visit was spent in counseling the patient on the above listed diagnoses and treatment options.     Myriam Reyes PA-C  9/20/2024  9:06 AM

## 2024-09-20 NOTE — PROGRESS NOTES
A&Ox4. VSS. 2L nasal cannula while asleep, wears CPAP at home. . Denies chest pain and SOB.   Telemetry: NSR.   GI: Abdomen soft, nondistended. Passing gas and belching.  Denies nausea at this time, tolerating diet  Voiding without difficulty.   Pain controlled with PRN pain medications.   Up with standby assist.   Incisions: Laps x 3, skin glue JANICE, C/D/I  IVF saline locked  All appropriate safety measures in place. All questions and concerns addressed. Call light within reach

## 2024-09-20 NOTE — PLAN OF CARE
Patients IV d/c'd, catheter intact. All discharge instructions explained, all questions answered. Patient will be discharged via wheelchair with support staff.

## 2024-09-20 NOTE — PLAN OF CARE
Patient resting in bed. No complaints of nausea. Pain with ambulation. Incisional sites intact, bruising noted. Denies chest calf pain. POC discussed with patient, all questions and concerns addressed. All safety measures in place.

## 2024-09-23 ENCOUNTER — TELEPHONE (OUTPATIENT)
Facility: LOCATION | Age: 69
End: 2024-09-23

## 2024-09-23 DIAGNOSIS — K40.20 NON-RECURRENT BILATERAL INGUINAL HERNIA WITHOUT OBSTRUCTION OR GANGRENE: ICD-10-CM

## 2024-09-23 DIAGNOSIS — K42.9 UMBILICAL HERNIA WITHOUT OBSTRUCTION AND WITHOUT GANGRENE: ICD-10-CM

## 2024-09-23 RX ORDER — TRAMADOL HYDROCHLORIDE 50 MG/1
50 TABLET ORAL EVERY 8 HOURS PRN
Qty: 10 TABLET | Refills: 0 | Status: SHIPPED | OUTPATIENT
Start: 2024-09-23

## 2024-09-27 DIAGNOSIS — K40.20 NON-RECURRENT BILATERAL INGUINAL HERNIA WITHOUT OBSTRUCTION OR GANGRENE: ICD-10-CM

## 2024-09-27 DIAGNOSIS — K42.9 UMBILICAL HERNIA WITHOUT OBSTRUCTION AND WITHOUT GANGRENE: ICD-10-CM

## 2024-09-27 RX ORDER — TRAMADOL HYDROCHLORIDE 50 MG/1
50 TABLET ORAL EVERY 8 HOURS PRN
Qty: 10 TABLET | Refills: 0 | OUTPATIENT
Start: 2024-09-27

## 2024-09-30 DIAGNOSIS — K44.9 HIATAL HERNIA: ICD-10-CM

## 2024-10-02 RX ORDER — FAMOTIDINE 20 MG/1
20 TABLET, FILM COATED ORAL 2 TIMES DAILY PRN
Qty: 60 TABLET | Refills: 0 | Status: SHIPPED | OUTPATIENT
Start: 2024-10-02

## 2024-10-02 NOTE — TELEPHONE ENCOUNTER
Did not pass protocol  Last office visit 5/3/2024  Last refill was: , __tabs  Next appointment: 11/22/2024    Please sign pended medication if appropriate     Medication Quantity Refills Start End   famotidine 40 MG Oral Tab -- --  --   Sig:   Take 1 tablet (40 mg total) by mouth 2 (two) times daily as needed for Heartburn.

## 2024-10-04 ENCOUNTER — OFFICE VISIT (OUTPATIENT)
Facility: LOCATION | Age: 69
End: 2024-10-04
Payer: MEDICARE

## 2024-10-04 VITALS
DIASTOLIC BLOOD PRESSURE: 77 MMHG | OXYGEN SATURATION: 97 % | RESPIRATION RATE: 18 BRPM | SYSTOLIC BLOOD PRESSURE: 131 MMHG | HEIGHT: 65 IN | BODY MASS INDEX: 30.16 KG/M2 | WEIGHT: 181 LBS | HEART RATE: 65 BPM | TEMPERATURE: 97 F

## 2024-10-04 DIAGNOSIS — Z98.890 POST-OPERATIVE STATE: Primary | ICD-10-CM

## 2024-10-04 PROCEDURE — 99024 POSTOP FOLLOW-UP VISIT: CPT | Performed by: PHYSICIAN ASSISTANT

## 2024-10-04 NOTE — PROGRESS NOTES
Post Operative Visit Note       Active Problems  1. Post-operative state         Chief Complaint   Chief Complaint   Patient presents with    Post-Op     PO- BILATERAL INGUINAL HERNIA REPAIR WITH MESH PLUG AND UMBILICAL HERNIORRHAPHY c/o burning and swelling on right side incision           History of Present Illness   69 year old female who presents today for postoperative visit following bilateral inguinal hernia repair with mesh and umbilical hernia repair on 2024 by Dr. Collier.  The patient reports mild incisional pain.  She also reports burning.  She denies nausea or vomiting.  She is tolerating diet.  She denies diarrhea or constipation.  She denies fever or chills.        Allergies  Jan is allergic to moxifloxacin hcl in nacl, statins, venlafaxine, ketorolac tromethamine, pregabalin, trazodone, amitriptyline, and risperidone.    Past Medical / Surgical / Social / Family History    The past medical and past surgical history have been reviewed by me today.     Past Medical History:    Anxiety state    Benign essential HTN    Cataract    COPD (chronic obstructive pulmonary disease) (HCC)    Coronary atherosclerosis    Deep vein thrombosis (HCC)    STENT LEFT LEG    Generalized anxiety disorder with panic attacks    Heart murmur    High blood pressure    High cholesterol    Hx of motion sickness    Osteoarthritis    Other chest pain    Sleep apnea    CPAP    Thrombophilia (HCC)    Visual impairment    GLASSES     Past Surgical History:   Procedure Laterality Date    Back surgery      Bypass surgery      Cabg  2022    Cataract  2021    right and left with toric     Cath bare metal stent (bms)  2023    X2    Cholecystectomy      Colonoscopy      Excis lumbar disk,one level      Hysterectomy      total hyseterectomy and oophorecomty at age 24.           Other  2022    Lipoma    Other surgical history      back surgery l5s1 fusion in , then screw removed, which caussed spinal fluid  leak which was fixed after three surgeries four year aso     Spine surgery procedure unlisted      4 back surgeries    Tonsillectomy         The family history and social history have been reviewed by me today.    Family History   Problem Relation Age of Onset    Lipids Father     Lipids Mother     No Known Problems Daughter     No Known Problems Son      Social History     Socioeconomic History    Marital status:    Tobacco Use    Smoking status: Former     Current packs/day: 0.00     Types: Cigarettes     Quit date: 2014     Years since quitting: 10.7    Smokeless tobacco: Never    Tobacco comments:     quit 10 years ago   Vaping Use    Vaping status: Never Used   Substance and Sexual Activity    Alcohol use: Never    Drug use: Never   Other Topics Concern    Caffeine Concern No    Exercise Yes     Comment: walk 3 days a week        Current Outpatient Medications:     FAMOTIDINE 20 MG Oral Tab, TAKE ONE TABLET BY MOUTH TWICE DAILY AS NEEDED FOR HEARTBURN, Disp: 60 tablet, Rfl: 0    clopidogrel 75 MG Oral Tab, Take 1 tablet (75 mg total) by mouth daily., Disp: , Rfl:     rivaroxaban (XARELTO) 10 MG Oral Tab, Take 1 tablet (10 mg total) by mouth daily with food., Disp: , Rfl:     polyethylene glycol, PEG 3350, 17 g Oral Powd Pack, Take 17 g by mouth daily as needed., Disp: 30 each, Rfl: 0    famotidine 40 MG Oral Tab, Take 1 tablet (40 mg total) by mouth 2 (two) times daily as needed for Heartburn., Disp: , Rfl:     ondansetron 4 MG Oral Tablet Dispersible, Take 1 tablet (4 mg total) by mouth every 4 (four) hours as needed for Nausea., Disp: 10 tablet, Rfl: 0    tiZANidine HCl 2 MG Oral Cap, Take 1 capsule (2 mg total) by mouth 2 (two) times daily as needed for Muscle spasms., Disp: , Rfl:     metoprolol succinate ER 25 MG Oral Tablet 24 Hr, Take 1 tablet (25 mg total) by mouth daily., Disp: , Rfl:     furosemide (LASIX) 20 MG Oral Tab, Take 0.5 tablets (10 mg total) by mouth daily as needed., Disp: , Rfl:        Review of Systems  A 10 point Review of Systems has been completed by me today and is negative except as above in the HPI.    Physical Findings   /77   Pulse 65   Temp 97.3 °F (36.3 °C)   Resp 18   Ht 65\"   Wt 181 lb (82.1 kg)   SpO2 97%   BMI 30.12 kg/m²   Gen/psych: alert and oriented, cooperative, no apparent distress  Cardiovascular: regular rate  Respiratory: respirations unlabored, no wheeze  Abdominal: soft, non-tender, non-distended, no guarding/rebound  Incisions:  Incisions are healing well.  Dermabond is in place.  There is mild surrounding bruising.  There is no surrounding erythema or induration.       Assessment/Plan  1. Post-operative state      The patient is overall doing well following bilateral inguinal hernia repair with mesh.  She is to continue with diet as tolerated.  She is to continue with lifting restrictions of no more than 20 pounds for 6 weeks with a date of her surgery.  All questions or concerns were answered.  She is to return to the clinic in 1 month for follow-up with Dr. Collier, sooner if needed.     No orders of the defined types were placed in this encounter.       Imaging & Referrals   None        Juana Dawn PA-C  [facility] General Surgery  10/4/2024  1:28 PM

## 2024-11-07 ENCOUNTER — OFFICE VISIT (OUTPATIENT)
Facility: LOCATION | Age: 69
End: 2024-11-07
Payer: MEDICARE

## 2024-11-07 VITALS
BODY MASS INDEX: 30 KG/M2 | SYSTOLIC BLOOD PRESSURE: 127 MMHG | HEIGHT: 65 IN | OXYGEN SATURATION: 96 % | RESPIRATION RATE: 16 BRPM | HEART RATE: 75 BPM | DIASTOLIC BLOOD PRESSURE: 81 MMHG | TEMPERATURE: 98 F

## 2024-11-07 DIAGNOSIS — K44.9 HIATAL HERNIA: ICD-10-CM

## 2024-11-07 DIAGNOSIS — K40.20 NON-RECURRENT BILATERAL INGUINAL HERNIA WITHOUT OBSTRUCTION OR GANGRENE: Primary | ICD-10-CM

## 2024-11-07 DIAGNOSIS — Z98.890 S/P BILATERAL INGUINAL HERNIA REPAIR: ICD-10-CM

## 2024-11-07 DIAGNOSIS — Z87.19 S/P BILATERAL INGUINAL HERNIA REPAIR: ICD-10-CM

## 2024-11-07 PROCEDURE — 1160F RVW MEDS BY RX/DR IN RCRD: CPT | Performed by: STUDENT IN AN ORGANIZED HEALTH CARE EDUCATION/TRAINING PROGRAM

## 2024-11-07 PROCEDURE — 1159F MED LIST DOCD IN RCRD: CPT | Performed by: STUDENT IN AN ORGANIZED HEALTH CARE EDUCATION/TRAINING PROGRAM

## 2024-11-07 PROCEDURE — 99024 POSTOP FOLLOW-UP VISIT: CPT | Performed by: STUDENT IN AN ORGANIZED HEALTH CARE EDUCATION/TRAINING PROGRAM

## 2024-11-07 RX ORDER — EVOLOCUMAB 140 MG/ML
INJECTION, SOLUTION SUBCUTANEOUS
COMMUNITY
Start: 2024-10-28

## 2024-11-07 RX ORDER — EZETIMIBE 10 MG/1
10 TABLET ORAL DAILY
COMMUNITY
Start: 2024-10-08

## 2024-11-07 NOTE — PROGRESS NOTES
Patient ID: Jan Babb is a 69 year old female.    Chief Complaint   Patient presents with    Follow - Up     / BILATERAL INGUINAL HERNIA REPAIR WITH MESH PLUG AND UMBILICAL HERNIORRHAPHY - Pt is still having pain and numbness in the inguinal areas.    Post-op visit 10/4/24       HPI: Jan Babb is a 69 year old female presents to clinic for follow-up.  Since last clinic appointment, patient continues to have pain and numbness in her groins bilaterally.  She still has poor appetite which was present prior to surgery.  She does have normal bowel function.  She also presents to inquire about her hiatal hernia.  She is still taking Pepcid 20 mg daily and increase this to 40 mg as needed.    Workup: None      Past Medical History  Past Medical History:    Anxiety state    Benign essential HTN    Cataract    COPD (chronic obstructive pulmonary disease) (HCC)    Coronary atherosclerosis    Deep vein thrombosis (HCC)    STENT LEFT LEG    Generalized anxiety disorder with panic attacks    Heart murmur    High blood pressure    High cholesterol    Hx of motion sickness    Osteoarthritis    Other chest pain    Sleep apnea    CPAP    Thrombophilia (HCC)    Visual impairment    GLASSES       Past Surgical History  Past Surgical History:   Procedure Laterality Date    Back surgery      Bypass surgery      Cabg  2022    Cataract  2021    right and left with toric     Cath bare metal stent (bms)  2023    X2    Cholecystectomy      Colonoscopy      Excis lumbar disk,one level      Hysterectomy      total hyseterectomy and oophorecomty at age 24.           Other  2022    Lipoma    Other surgical history      back surgery l5s1 fusion in , then screw removed, which caussed spinal fluid leak which was fixed after three surgeries four year aso     Spine surgery procedure unlisted      4 back surgeries    Tonsillectomy         Medications  Current Outpatient Medications   Medication Sig Dispense Refill     REPATHA SURECLICK 140 MG/ML Subcutaneous Solution Auto-injector       ezetimibe 10 MG Oral Tab Take 1 tablet (10 mg total) by mouth daily.      clopidogrel 75 MG Oral Tab Take 1 tablet (75 mg total) by mouth daily.      rivaroxaban (XARELTO) 10 MG Oral Tab Take 1 tablet (10 mg total) by mouth daily with food.      famotidine 40 MG Oral Tab Take 1 tablet (40 mg total) by mouth 2 (two) times daily as needed for Heartburn.      tiZANidine HCl 2 MG Oral Cap Take 1 capsule (2 mg total) by mouth 2 (two) times daily as needed for Muscle spasms.      metoprolol succinate ER 25 MG Oral Tablet 24 Hr Take 1 tablet (25 mg total) by mouth daily.      furosemide (LASIX) 20 MG Oral Tab Take 0.5 tablets (10 mg total) by mouth daily as needed.         Allergies  Allergies[1]    Social History  History   Smoking Status    Former    Types: Cigarettes   Smokeless Tobacco    Never     History   Alcohol Use Never     History   Drug Use Unknown       Family History  Family History   Problem Relation Age of Onset    Lipids Father     Lipids Mother     No Known Problems Daughter     No Known Problems Son        Review of Systems  Review of Systems   Constitutional: Negative.    Respiratory: Negative.     Cardiovascular: Negative.    Gastrointestinal:  Negative for abdominal distention, abdominal pain, constipation, nausea and vomiting.       Exam  Vitals:    11/07/24 1342   BP: 127/81   Pulse: 75   Resp: 16   Temp: 97.8 °F (36.6 °C)     Physical Exam  Constitutional:       Appearance: Normal appearance.   Cardiovascular:      Rate and Rhythm: Normal rate.   Pulmonary:      Effort: Pulmonary effort is normal.   Abdominal:      General: There is no distension.      Palpations: Abdomen is soft.      Tenderness: There is no abdominal tenderness.      Hernia: No hernia is present.       Skin:     General: Skin is warm and dry.   Neurological:      Mental Status: She is alert and oriented to person, place, and time.            Assessment/Plan  Assessment   Problem List Items Addressed This Visit          Gastrointestinal and Abdominal    Hiatal hernia    Relevant Orders    XR UPPER GI SINGLE CONTRAST (CPT=74240)    Non-recurrent bilateral inguinal hernia without obstruction or gangrene - Primary     Other Visit Diagnoses       S/P bilateral inguinal hernia repair                Jan Babb is a 69 year old female with umbilical hernia and bilateral inguinal hernia status post open repair    On physical exam, patient has well-healed surgical incisions  She has no evidence of recurrence  She still has some pain with activity mainly in the left groin  She feels numbness in her groins bilaterally, the left ilioinguinal nerve was ligated but the right preserved  I discussed with the patient that at this time, most of the tissue healing should be done but she may have nerves that still need time to heal  She should continue to monitor the pain and let me know if it gets worse or better    Patient also has small hiatal hernia  Her last CT images were reviewed again by me and with her  Hiatal hernia is small  Patient has significant medical history including COPD and CAD  I recommend that she obtain upper GI to evaluate the hiatal hernia in better detail  Once she has imaging done, she should follow-up for reevaluation of her groin pain and to review the images    If she has any questions or concerns she can contact the      Halley Collier MD  General Surgery  [facility]     CC:  Obdulia Waldrop MD         [1]   Allergies  Allergen Reactions    Moxifloxacin Hcl In Nacl SHORTNESS OF BREATH    Statins OTHER (SEE COMMENTS)     Bone pain     Venlafaxine OTHER (SEE COMMENTS)     Unknown     Ketorolac Tromethamine ANXIETY    Pregabalin OTHER (SEE COMMENTS)    Trazodone OTHER (SEE COMMENTS)    Amitriptyline FATIGUE    Risperidone NAUSEA ONLY

## 2024-11-14 ENCOUNTER — TELEPHONE (OUTPATIENT)
Dept: FAMILY MEDICINE CLINIC | Facility: CLINIC | Age: 69
End: 2024-11-14

## 2024-11-14 ENCOUNTER — PATIENT MESSAGE (OUTPATIENT)
Dept: FAMILY MEDICINE CLINIC | Facility: CLINIC | Age: 69
End: 2024-11-14

## 2024-11-14 DIAGNOSIS — R19.5 POSITIVE FIT (FECAL IMMUNOCHEMICAL TEST): Primary | ICD-10-CM

## 2024-11-14 NOTE — TELEPHONE ENCOUNTER
Received test result from [facility] with results of fecal immunochemical test. The result was abnormal, it showed blood in the stool. Based on this result I recommend follow up with gastroenterology. Thank you.

## 2024-11-15 ENCOUNTER — MED REC SCAN ONLY (OUTPATIENT)
Dept: FAMILY MEDICINE CLINIC | Facility: CLINIC | Age: 69
End: 2024-11-15

## 2024-11-19 ENCOUNTER — LAB ENCOUNTER (OUTPATIENT)
Dept: LAB | Age: 69
End: 2024-11-19
Attending: STUDENT IN AN ORGANIZED HEALTH CARE EDUCATION/TRAINING PROGRAM
Payer: MEDICARE

## 2024-11-19 DIAGNOSIS — R73.03 PREDIABETES: ICD-10-CM

## 2024-11-19 DIAGNOSIS — K76.0 FATTY LIVER: ICD-10-CM

## 2024-11-19 LAB
ALBUMIN SERPL-MCNC: 4.3 G/DL (ref 3.2–4.8)
ALBUMIN/GLOB SERPL: 1.8 {RATIO} (ref 1–2)
ALP LIVER SERPL-CCNC: 94 U/L
ALT SERPL-CCNC: 11 U/L
ANION GAP SERPL CALC-SCNC: 8 MMOL/L (ref 0–18)
AST SERPL-CCNC: 18 U/L (ref ?–34)
BILIRUB SERPL-MCNC: 0.5 MG/DL (ref 0.2–1.1)
BUN BLD-MCNC: 10 MG/DL (ref 9–23)
CALCIUM BLD-MCNC: 9.6 MG/DL (ref 8.7–10.4)
CHLORIDE SERPL-SCNC: 110 MMOL/L (ref 98–112)
CO2 SERPL-SCNC: 26 MMOL/L (ref 21–32)
CREAT BLD-MCNC: 0.98 MG/DL
EGFRCR SERPLBLD CKD-EPI 2021: 62 ML/MIN/1.73M2 (ref 60–?)
EST. AVERAGE GLUCOSE BLD GHB EST-MCNC: 117 MG/DL (ref 68–126)
FASTING STATUS PATIENT QL REPORTED: NO
GLOBULIN PLAS-MCNC: 2.4 G/DL (ref 2–3.5)
GLUCOSE BLD-MCNC: 90 MG/DL (ref 70–99)
HBA1C MFR BLD: 5.7 % (ref ?–5.7)
OSMOLALITY SERPL CALC.SUM OF ELEC: 297 MOSM/KG (ref 275–295)
POTASSIUM SERPL-SCNC: 3.7 MMOL/L (ref 3.5–5.1)
PROT SERPL-MCNC: 6.7 G/DL (ref 5.7–8.2)
SODIUM SERPL-SCNC: 144 MMOL/L (ref 136–145)

## 2024-11-19 PROCEDURE — 83036 HEMOGLOBIN GLYCOSYLATED A1C: CPT

## 2024-11-19 PROCEDURE — 36415 COLL VENOUS BLD VENIPUNCTURE: CPT

## 2024-11-19 PROCEDURE — 80053 COMPREHEN METABOLIC PANEL: CPT

## 2024-11-20 ENCOUNTER — PATIENT MESSAGE (OUTPATIENT)
Dept: FAMILY MEDICINE CLINIC | Facility: CLINIC | Age: 69
End: 2024-11-20

## 2024-11-23 ENCOUNTER — HOSPITAL ENCOUNTER (OUTPATIENT)
Dept: GENERAL RADIOLOGY | Facility: HOSPITAL | Age: 69
Discharge: HOME | End: 2024-11-23
Attending: STUDENT IN AN ORGANIZED HEALTH CARE EDUCATION/TRAINING PROGRAM
Payer: MEDICARE

## 2024-11-23 DIAGNOSIS — K44.9 HIATAL HERNIA: ICD-10-CM

## 2024-11-23 PROCEDURE — 74240 X-RAY XM UPR GI TRC 1CNTRST: CPT | Performed by: STUDENT IN AN ORGANIZED HEALTH CARE EDUCATION/TRAINING PROGRAM

## 2024-12-06 ENCOUNTER — LAB ENCOUNTER (OUTPATIENT)
Dept: LAB | Age: 69
End: 2024-12-06
Attending: STUDENT IN AN ORGANIZED HEALTH CARE EDUCATION/TRAINING PROGRAM
Payer: MEDICARE

## 2024-12-06 DIAGNOSIS — R19.5 POSITIVE FIT (FECAL IMMUNOCHEMICAL TEST): ICD-10-CM

## 2024-12-06 LAB — HEMOCCULT STL QL: NEGATIVE

## 2024-12-06 PROCEDURE — 82274 ASSAY TEST FOR BLOOD FECAL: CPT

## 2024-12-12 ENCOUNTER — HOSPITAL ENCOUNTER (OUTPATIENT)
Dept: ULTRASOUND IMAGING | Age: 69
Discharge: HOME | End: 2024-12-12
Attending: STUDENT IN AN ORGANIZED HEALTH CARE EDUCATION/TRAINING PROGRAM
Payer: MEDICARE

## 2024-12-12 DIAGNOSIS — K76.0 FATTY LIVER: ICD-10-CM

## 2024-12-12 PROCEDURE — 76981 USE PARENCHYMA: CPT | Performed by: STUDENT IN AN ORGANIZED HEALTH CARE EDUCATION/TRAINING PROGRAM

## 2024-12-12 PROCEDURE — 76705 ECHO EXAM OF ABDOMEN: CPT | Performed by: STUDENT IN AN ORGANIZED HEALTH CARE EDUCATION/TRAINING PROGRAM

## 2025-01-17 ENCOUNTER — TELEPHONE (OUTPATIENT)
Facility: LOCATION | Age: 70
End: 2025-01-17

## 2025-01-17 ENCOUNTER — OFFICE VISIT (OUTPATIENT)
Facility: LOCATION | Age: 70
End: 2025-01-17
Payer: MEDICARE

## 2025-01-17 VITALS
HEART RATE: 70 BPM | WEIGHT: 181 LBS | TEMPERATURE: 99 F | HEIGHT: 65 IN | OXYGEN SATURATION: 95 % | DIASTOLIC BLOOD PRESSURE: 58 MMHG | BODY MASS INDEX: 30.16 KG/M2 | SYSTOLIC BLOOD PRESSURE: 118 MMHG

## 2025-01-17 DIAGNOSIS — K21.00 GASTROESOPHAGEAL REFLUX DISEASE WITH ESOPHAGITIS, UNSPECIFIED WHETHER HEMORRHAGE: Primary | ICD-10-CM

## 2025-01-17 DIAGNOSIS — K44.9 HIATAL HERNIA WITH GERD: Primary | ICD-10-CM

## 2025-01-17 DIAGNOSIS — K21.9 HIATAL HERNIA WITH GERD: Primary | ICD-10-CM

## 2025-01-17 RX ORDER — BUPRENORPHINE AND NALOXONE 2; .5 MG/1; MG/1
FILM, SOLUBLE BUCCAL; SUBLINGUAL
COMMUNITY
Start: 2024-11-21 | End: 2025-01-24 | Stop reason: ALTCHOICE

## 2025-01-19 DIAGNOSIS — K44.9 HIATAL HERNIA: ICD-10-CM

## 2025-01-21 NOTE — TELEPHONE ENCOUNTER
Requested Prescriptions     Pending Prescriptions Disp Refills    FAMOTIDINE 20 MG Oral Tab [Pharmacy Med Name: Famotidine 20 Mg Tab [facility]] 60 tablet 0     Sig: TAKE ONE TABLET BY MOUTH TWICE DAILY AS NEEDED FOR HEARTBURN     On 11/7/24, medication was removed from medication list and discontinued.  [facility] sent to patient asking if she is requesting this refill - patient confirmed she is still taking the medicaiton.    Last refill 10/2/24 #60  LOV 8/22/24  Future Appointments   Date Time Provider Department Center   3/17/2025  2:00 PM Obdulia Waldrop MD [facility] [id number]

## 2025-01-22 RX ORDER — FAMOTIDINE 20 MG/1
20 TABLET, FILM COATED ORAL 2 TIMES DAILY PRN
Qty: 180 TABLET | Refills: 0 | Status: SHIPPED | OUTPATIENT
Start: 2025-01-22

## 2025-01-24 ENCOUNTER — APPOINTMENT (OUTPATIENT)
Dept: ADMINISTRATIVE | Facility: HOSPITAL | Age: 70
End: 2025-01-24
Payer: MEDICARE

## 2025-01-24 PROBLEM — K21.00 GASTROESOPHAGEAL REFLUX DISEASE WITH ESOPHAGITIS: Status: ACTIVE | Noted: 2025-01-22

## 2025-01-24 NOTE — PROGRESS NOTES
Patient ID: Jan Babb is a 69 year old female.    Chief Complaint   Patient presents with    Follow - Up     F/U - Go over results from XR on 11/23, no symptoms.        HPI: Jan Babb is a 69 year old female presents to clinic for follow-up.  Since last clinic appointment, patient continues to have significant heartburn symptoms.  She states that she has epigastric pain and sensation of reflux every day.  She is otherwise healing well from her hernia surgery.  She is interested in any treatment for symptomatic relief.    Workup:   XR UPPER GI SINGLE CONTRAST (CPT=74240)    Result Date: 11/23/2024  PROCEDURE:  XR UPPER GI SINGLE CONTRAST (CPT=74240)  TECHNIQUE:  A single contrast upper gastrointestinal series was performed in the usual manner.  A  abdominal radiograph was performed.  COMPARISON:  [facility] , CT, CT ABDOMEN+PELVIS(CONTRAST ONLY)(CPT=74177), 5/04/2024, 8:53 AM.  [facility] , XR, XR CHEST AP PORTABLE  (CPT=71045), 12/26/2022, 4:49 PM.  INDICATIONS:  K44.9 Hiatal hernia  PATIENT STATED HISTORY: (As transcribed by Technologist)  Patient states she has a hiatal hernia. Patient states food does get stuck near distal esophagus. Patient does have nausea. Patient states she had heart bypass surgery 3 years ago.   FLUOROSCOPY IMAGES OBTAINED:  24 images FLUOROSCOPY TIME:  4 minutes 11 seconds RADIATION DOSE (AIR KERMA PRODUCT):  2933.4uGy/m2  FINDINGS:   The patient swallowed thick and thin barium.  The esophagus is of normal caliber and contour without evidence of stenosis or abnormal dilatation.  Hiatal hernia that expands to a maximum dimension of 3.7 x 3.1 cm is noted.  The patient swallowed a 0.5 in barium tablet without difficulty.  The tablet traversed the esophagus and hiatal hernia into the stomach in a rapid fashion.  Gastric mucosal pattern is normal.  The ligament of Treitz is in the normal position.  There is normal emptying of contrast from the stomach.  No abnormal filling defect is  identified.  Mild reflux to the proximal esophagus.  IMPRESSION:  1. Hiatal hernia expands to a maximum dimension of 3.7 x 3.1 cm.  2. Mild reflux to the proximal esophagus.    LOCATION:  [facility]   Dictated by (CST): Nguyễn Colin MD on 2024 at 10:39 AM     Finalized by (CST): Nguyễn Colin MD on 2024 at 10:42 AM          Past Medical History  Past Medical History:    Allergic rhinitis    Anesthesia complication    DECREASED BREATHING/RESPIRATIONS AFTER PREVIOUS SURGERIES    Anxiety    Anxiety state    Back problem    Benign essential HTN    Cataract    COPD (chronic obstructive pulmonary disease) (HCC)    NO HOME O2    Coronary atherosclerosis    Deep vein thrombosis (HCC)    STENT LEFT LEG    Disorder of liver    FATTY LIVER    Essential hypertension    Always had it    Generalized anxiety disorder with panic attacks    Heart murmur    High blood pressure    High cholesterol    History of blood transfusion    AROUND AGE 19, NO ADVERSE REACTIONS    Hx of motion sickness    Hyperlipidemia    Since I was 17    Osteoarthritis    Other chest pain    Shortness of breath    Sleep apnea    NO LONGER USING CPAP    Thrombophilia (HCC)    Visual impairment    GLASSES       Past Surgical History  Past Surgical History:   Procedure Laterality Date    Angioplasty (coronary)      Have 3 stents    Appendectomy      Back surgery      Bypass surgery      Cabg  2022    Cataract  2021    right and left with toric     Cath bare metal stent (bms)  2023    X2    Cholecystectomy      Colonoscopy      Elective carotid intervention      Excis lumbar disk,one level      Glaucoma surgery      Hysterectomy      total hyseterectomy and oophorecomty at age 24.     Laparoscopic cholecystectomy            Oophorectomy      Other  2022    Lipoma    Other surgical history      back surgery l5s1 fusion in , then screw removed, which caussed spinal fluid leak which was fixed after three surgeries four  year aso     Replacement prosthetic aortic valve w/ cardiopulmonary bypass; w/ stent< tissue valve  2023    Spine surgery procedure unlisted      4 back surgeries    Tonsillectomy         Medications  Current Outpatient Medications   Medication Sig Dispense Refill    REPATHA SURECLICK 140 MG/ML Subcutaneous Solution Auto-injector       ezetimibe 10 MG Oral Tab Take 1 tablet (10 mg total) by mouth daily.      clopidogrel 75 MG Oral Tab Take 1 tablet (75 mg total) by mouth daily.      rivaroxaban (XARELTO) 10 MG Oral Tab Take 1 tablet (10 mg total) by mouth daily with food.      tiZANidine HCl 2 MG Oral Cap Take 1 capsule (2 mg total) by mouth 2 (two) times daily as needed for Muscle spasms.      metoprolol succinate ER 25 MG Oral Tablet 24 Hr Take 1 tablet (25 mg total) by mouth daily.      furosemide (LASIX) 20 MG Oral Tab Take 0.5 tablets (10 mg total) by mouth daily as needed.      famotidine 20 MG Oral Tab Take 1 tablet (20 mg total) by mouth 2 (two) times daily as needed for Heartburn. 180 tablet 0       Allergies  Allergies[1]    Social History  History   Smoking Status    Former    Types: Cigarettes   Smokeless Tobacco    Never     History   Alcohol Use Never     History   Drug Use Unknown       Family History  Family History   Problem Relation Age of Onset    Lipids Father     Lipids Mother     No Known Problems Daughter     No Known Problems Son     Heart Disease Brother        Review of Systems  Review of Systems   Constitutional: Negative.    Respiratory: Negative.     Cardiovascular: Negative.    Gastrointestinal:  Negative for abdominal distention, abdominal pain, constipation, diarrhea, nausea and vomiting.       Exam  Vitals:    01/17/25 1314   BP: 118/58   Pulse: 70   Temp: 98.6 °F (37 °C)     Physical Exam  Constitutional:       Appearance: Normal appearance.   Cardiovascular:      Rate and Rhythm: Normal rate.   Pulmonary:      Effort: Pulmonary effort is normal.   Abdominal:      General: Abdomen  is flat. There is no distension.      Palpations: Abdomen is soft.      Tenderness: There is no abdominal tenderness.      Comments: Well-healed surgical incisions   Skin:     General: Skin is warm and dry.   Neurological:      Mental Status: She is alert and oriented to person, place, and time.           Assessment/Plan  Assessment   Problem List Items Addressed This Visit    None  Visit Diagnoses       Hiatal hernia with GERD    -  Primary    Relevant Orders    Dr. Collier's Surgical Case Request (do not use for updates/changes within 48 hrs of procedure)            Jan Babb is a 69 year old female with hiatal hernia with GERD    Patient has been taking the Pepcid twice daily without much relief of her heartburn symptoms  She does report some improvement but still has a significant amount every day  Upper GI was reviewed personally by me and with the patient  Upper GI shows small hiatal hernia approximately 3 cm with some reflux  Patient may benefit from gastropexy  We will plan for laparoscopic gastropexy, possible open  Procedure was explained to the patient  Risks include bleeding, pain, infection, injury to anything in the abdomen, and need for further intervention  Patient will be admitted for ops postoperatively  She will need to undergo anticoagulation management  She is on Plavix and Xarelto  She will need to hold Plavix at least 5 days and Xarelto 2 days  Patient acknowledged understanding and agreed to the plan    She is to call the office for any questions or concerns      Halley Collier MD  General Surgery  [facility]     CC:  Obdulia Waldrop MD         [1]   Allergies  Allergen Reactions    Moxifloxacin Hcl In Nacl SHORTNESS OF BREATH    Statins OTHER (SEE COMMENTS)     Bone pain     Venlafaxine OTHER (SEE COMMENTS)     Unknown     Ketorolac Tromethamine ANXIETY    Pregabalin OTHER (SEE COMMENTS)    Trazodone OTHER (SEE COMMENTS)     \"HYPER\"    Amitriptyline FATIGUE     Risperidone NAUSEA ONLY

## 2025-01-27 ENCOUNTER — TELEPHONE (OUTPATIENT)
Facility: LOCATION | Age: 70
End: 2025-01-27

## 2025-01-27 ENCOUNTER — LABORATORY ENCOUNTER (OUTPATIENT)
Dept: LAB | Age: 70
End: 2025-01-27
Attending: STUDENT IN AN ORGANIZED HEALTH CARE EDUCATION/TRAINING PROGRAM
Payer: MEDICARE

## 2025-01-27 DIAGNOSIS — K21.9 HIATAL HERNIA WITH GERD: Primary | ICD-10-CM

## 2025-01-27 DIAGNOSIS — K44.9 HIATAL HERNIA WITH GERD: Primary | ICD-10-CM

## 2025-01-27 DIAGNOSIS — Z01.818 PRE-OP TESTING: ICD-10-CM

## 2025-01-27 LAB
ANION GAP SERPL CALC-SCNC: 6 MMOL/L (ref 0–18)
BUN BLD-MCNC: 11 MG/DL (ref 9–23)
CALCIUM BLD-MCNC: 9.8 MG/DL (ref 8.7–10.6)
CHLORIDE SERPL-SCNC: 106 MMOL/L (ref 98–112)
CO2 SERPL-SCNC: 28 MMOL/L (ref 21–32)
CREAT BLD-MCNC: 0.98 MG/DL
EGFRCR SERPLBLD CKD-EPI 2021: 62 ML/MIN/1.73M2 (ref 60–?)
ERYTHROCYTE [DISTWIDTH] IN BLOOD BY AUTOMATED COUNT: 12.6 %
FASTING STATUS PATIENT QL REPORTED: YES
GLUCOSE BLD-MCNC: 105 MG/DL (ref 70–99)
HCT VFR BLD AUTO: 43.1 %
HGB BLD-MCNC: 14.1 G/DL
MCH RBC QN AUTO: 28.8 PG (ref 26–34)
MCHC RBC AUTO-ENTMCNC: 32.7 G/DL (ref 31–37)
MCV RBC AUTO: 88.1 FL
OSMOLALITY SERPL CALC.SUM OF ELEC: 290 MOSM/KG (ref 275–295)
PLATELET # BLD AUTO: 258 10(3)UL (ref 150–450)
POTASSIUM SERPL-SCNC: 4.6 MMOL/L (ref 3.5–5.1)
RBC # BLD AUTO: 4.89 X10(6)UL
SODIUM SERPL-SCNC: 140 MMOL/L (ref 136–145)
WBC # BLD AUTO: 6 X10(3) UL (ref 4–11)

## 2025-01-27 PROCEDURE — 80048 BASIC METABOLIC PNL TOTAL CA: CPT

## 2025-01-27 PROCEDURE — 36415 COLL VENOUS BLD VENIPUNCTURE: CPT

## 2025-01-27 PROCEDURE — 85027 COMPLETE CBC AUTOMATED: CPT

## 2025-01-28 ENCOUNTER — TELEPHONE (OUTPATIENT)
Facility: LOCATION | Age: 70
End: 2025-01-28

## 2025-01-28 NOTE — TELEPHONE ENCOUNTER
Cardiac Clearance and Anticoagulation Management received from Dr Ismael Piedra.  Per note \"Patient is at low risk for hernia repair from cardiovascular standpoint..  Patient may hold Plavix 7 days prior to procedure.  Patient may hold Xarelto 2 days prior to procedure.\"    Faxed to [facility], placed in binder and patient notified.

## 2025-02-07 ENCOUNTER — TELEPHONE (OUTPATIENT)
Facility: LOCATION | Age: 70
End: 2025-02-07

## 2025-02-07 DIAGNOSIS — K21.9 HIATAL HERNIA WITH GERD: Primary | ICD-10-CM

## 2025-02-07 DIAGNOSIS — K44.9 HIATAL HERNIA WITH GERD: Primary | ICD-10-CM

## 2025-02-10 ENCOUNTER — HOSPITAL ENCOUNTER (OUTPATIENT)
Facility: HOSPITAL | Age: 70
Discharge: HOME | End: 2025-02-12
Attending: STUDENT IN AN ORGANIZED HEALTH CARE EDUCATION/TRAINING PROGRAM | Admitting: STUDENT IN AN ORGANIZED HEALTH CARE EDUCATION/TRAINING PROGRAM
Payer: MEDICARE

## 2025-02-10 ENCOUNTER — ANESTHESIA EVENT (OUTPATIENT)
Dept: SURGERY | Facility: HOSPITAL | Age: 70
End: 2025-02-10
Payer: MEDICARE

## 2025-02-10 ENCOUNTER — ANESTHESIA (OUTPATIENT)
Dept: SURGERY | Facility: HOSPITAL | Age: 70
End: 2025-02-10
Payer: MEDICARE

## 2025-02-10 DIAGNOSIS — G89.18 POST-OPERATIVE PAIN: ICD-10-CM

## 2025-02-10 DIAGNOSIS — Z01.818 PRE-OP TESTING: Primary | ICD-10-CM

## 2025-02-10 PROCEDURE — 43280 LAPAROSCOPY FUNDOPLASTY: CPT | Performed by: SURGERY

## 2025-02-10 PROCEDURE — 43280 LAPAROSCOPY FUNDOPLASTY: CPT | Performed by: STUDENT IN AN ORGANIZED HEALTH CARE EDUCATION/TRAINING PROGRAM

## 2025-02-10 PROCEDURE — 3E0T3BZ INTRODUCTION OF ANESTHETIC AGENT INTO PERIPHERAL NERVES AND PLEXI, PERCUTANEOUS APPROACH: ICD-10-PCS | Performed by: ANESTHESIOLOGY

## 2025-02-10 PROCEDURE — 99215 OFFICE O/P EST HI 40 MIN: CPT | Performed by: HOSPITALIST

## 2025-02-10 PROCEDURE — 3E0T33Z INTRODUCTION OF ANTI-INFLAMMATORY INTO PERIPHERAL NERVES AND PLEXI, PERCUTANEOUS APPROACH: ICD-10-PCS | Performed by: ANESTHESIOLOGY

## 2025-02-10 PROCEDURE — 0BQT4ZZ REPAIR DIAPHRAGM, PERCUTANEOUS ENDOSCOPIC APPROACH: ICD-10-PCS | Performed by: STUDENT IN AN ORGANIZED HEALTH CARE EDUCATION/TRAINING PROGRAM

## 2025-02-10 PROCEDURE — 76942 ECHO GUIDE FOR BIOPSY: CPT | Performed by: ANESTHESIOLOGY

## 2025-02-10 RX ORDER — ROPIVACAINE HYDROCHLORIDE 5 MG/ML
INJECTION, SOLUTION EPIDURAL; INFILTRATION; PERINEURAL AS NEEDED
Status: DISCONTINUED | OUTPATIENT
Start: 2025-02-10 | End: 2025-02-10 | Stop reason: SURG

## 2025-02-10 RX ORDER — GLYCOPYRROLATE 0.2 MG/ML
INJECTION, SOLUTION INTRAMUSCULAR; INTRAVENOUS AS NEEDED
Status: DISCONTINUED | OUTPATIENT
Start: 2025-02-10 | End: 2025-02-10 | Stop reason: SURG

## 2025-02-10 RX ORDER — EZETIMIBE 10 MG/1
10 TABLET ORAL NIGHTLY
Status: DISCONTINUED | OUTPATIENT
Start: 2025-02-10 | End: 2025-02-12

## 2025-02-10 RX ORDER — OXYCODONE HYDROCHLORIDE 10 MG/1
10 TABLET ORAL EVERY 4 HOURS PRN
Status: DISCONTINUED | OUTPATIENT
Start: 2025-02-10 | End: 2025-02-12

## 2025-02-10 RX ORDER — HYDROCODONE BITARTRATE AND ACETAMINOPHEN 10; 325 MG/1; MG/1
2 TABLET ORAL ONCE AS NEEDED
Status: DISCONTINUED | OUTPATIENT
Start: 2025-02-10 | End: 2025-02-10 | Stop reason: HOSPADM

## 2025-02-10 RX ORDER — METHOCARBAMOL 100 MG/ML
500 INJECTION, SOLUTION INTRAMUSCULAR; INTRAVENOUS EVERY 8 HOURS
Status: DISPENSED | OUTPATIENT
Start: 2025-02-10 | End: 2025-02-12

## 2025-02-10 RX ORDER — HYDROMORPHONE HYDROCHLORIDE 1 MG/ML
0.6 INJECTION, SOLUTION INTRAMUSCULAR; INTRAVENOUS; SUBCUTANEOUS EVERY 5 MIN PRN
Status: DISCONTINUED | OUTPATIENT
Start: 2025-02-10 | End: 2025-02-10 | Stop reason: HOSPADM

## 2025-02-10 RX ORDER — LABETALOL HYDROCHLORIDE 5 MG/ML
INJECTION, SOLUTION INTRAVENOUS
Status: COMPLETED
Start: 2025-02-10 | End: 2025-02-10

## 2025-02-10 RX ORDER — HYDROMORPHONE HYDROCHLORIDE 1 MG/ML
0.4 INJECTION, SOLUTION INTRAMUSCULAR; INTRAVENOUS; SUBCUTANEOUS EVERY 2 HOUR PRN
Status: DISCONTINUED | OUTPATIENT
Start: 2025-02-10 | End: 2025-02-12

## 2025-02-10 RX ORDER — METOPROLOL SUCCINATE 25 MG/1
25 TABLET, EXTENDED RELEASE ORAL NIGHTLY
Status: DISCONTINUED | OUTPATIENT
Start: 2025-02-10 | End: 2025-02-12

## 2025-02-10 RX ORDER — SODIUM CHLORIDE, SODIUM LACTATE, POTASSIUM CHLORIDE, CALCIUM CHLORIDE 600; 310; 30; 20 MG/100ML; MG/100ML; MG/100ML; MG/100ML
INJECTION, SOLUTION INTRAVENOUS CONTINUOUS
Status: DISCONTINUED | OUTPATIENT
Start: 2025-02-10 | End: 2025-02-10 | Stop reason: HOSPADM

## 2025-02-10 RX ORDER — METOCLOPRAMIDE HYDROCHLORIDE 5 MG/ML
10 INJECTION INTRAMUSCULAR; INTRAVENOUS EVERY 8 HOURS PRN
Status: DISCONTINUED | OUTPATIENT
Start: 2025-02-10 | End: 2025-02-10 | Stop reason: HOSPADM

## 2025-02-10 RX ORDER — FAMOTIDINE 20 MG/1
20 TABLET, FILM COATED ORAL DAILY
Status: DISCONTINUED | OUTPATIENT
Start: 2025-02-11 | End: 2025-02-12

## 2025-02-10 RX ORDER — ONDANSETRON 2 MG/ML
4 INJECTION INTRAMUSCULAR; INTRAVENOUS EVERY 6 HOURS PRN
Status: DISCONTINUED | OUTPATIENT
Start: 2025-02-10 | End: 2025-02-12

## 2025-02-10 RX ORDER — HYDROMORPHONE HYDROCHLORIDE 1 MG/ML
0.4 INJECTION, SOLUTION INTRAMUSCULAR; INTRAVENOUS; SUBCUTANEOUS EVERY 5 MIN PRN
Status: DISCONTINUED | OUTPATIENT
Start: 2025-02-10 | End: 2025-02-10 | Stop reason: HOSPADM

## 2025-02-10 RX ORDER — METOCLOPRAMIDE HYDROCHLORIDE 5 MG/ML
10 INJECTION INTRAMUSCULAR; INTRAVENOUS EVERY 8 HOURS PRN
Status: DISCONTINUED | OUTPATIENT
Start: 2025-02-10 | End: 2025-02-12

## 2025-02-10 RX ORDER — LIDOCAINE HYDROCHLORIDE 10 MG/ML
INJECTION, SOLUTION EPIDURAL; INFILTRATION; INTRACAUDAL; PERINEURAL AS NEEDED
Status: DISCONTINUED | OUTPATIENT
Start: 2025-02-10 | End: 2025-02-10 | Stop reason: SURG

## 2025-02-10 RX ORDER — TIZANIDINE 2 MG/1
2 TABLET ORAL 2 TIMES DAILY PRN
Status: DISCONTINUED | OUTPATIENT
Start: 2025-02-10 | End: 2025-02-12

## 2025-02-10 RX ORDER — OXYCODONE HYDROCHLORIDE 5 MG/1
5 TABLET ORAL EVERY 4 HOURS PRN
Status: DISCONTINUED | OUTPATIENT
Start: 2025-02-10 | End: 2025-02-12

## 2025-02-10 RX ORDER — TRAMADOL HYDROCHLORIDE 50 MG/1
50 TABLET ORAL EVERY 6 HOURS SCHEDULED
Status: DISCONTINUED | OUTPATIENT
Start: 2025-02-10 | End: 2025-02-12

## 2025-02-10 RX ORDER — SODIUM CHLORIDE, SODIUM LACTATE, POTASSIUM CHLORIDE, CALCIUM CHLORIDE 600; 310; 30; 20 MG/100ML; MG/100ML; MG/100ML; MG/100ML
INJECTION, SOLUTION INTRAVENOUS CONTINUOUS
Status: DISCONTINUED | OUTPATIENT
Start: 2025-02-10 | End: 2025-02-12

## 2025-02-10 RX ORDER — ACETAMINOPHEN 500 MG
1000 TABLET ORAL ONCE AS NEEDED
Status: DISCONTINUED | OUTPATIENT
Start: 2025-02-10 | End: 2025-02-10 | Stop reason: HOSPADM

## 2025-02-10 RX ORDER — ONDANSETRON 2 MG/ML
INJECTION INTRAMUSCULAR; INTRAVENOUS AS NEEDED
Status: DISCONTINUED | OUTPATIENT
Start: 2025-02-10 | End: 2025-02-10 | Stop reason: SURG

## 2025-02-10 RX ORDER — HYDROMORPHONE HYDROCHLORIDE 1 MG/ML
0.2 INJECTION, SOLUTION INTRAMUSCULAR; INTRAVENOUS; SUBCUTANEOUS EVERY 5 MIN PRN
Status: DISCONTINUED | OUTPATIENT
Start: 2025-02-10 | End: 2025-02-10 | Stop reason: HOSPADM

## 2025-02-10 RX ORDER — LABETALOL HYDROCHLORIDE 5 MG/ML
INJECTION, SOLUTION INTRAVENOUS AS NEEDED
Status: DISCONTINUED | OUTPATIENT
Start: 2025-02-10 | End: 2025-02-10 | Stop reason: SURG

## 2025-02-10 RX ORDER — BUPIVACAINE HYDROCHLORIDE 2.5 MG/ML
INJECTION, SOLUTION EPIDURAL; INFILTRATION; INTRACAUDAL AS NEEDED
Status: DISCONTINUED | OUTPATIENT
Start: 2025-02-10 | End: 2025-02-10 | Stop reason: HOSPADM

## 2025-02-10 RX ORDER — HYDROMORPHONE HYDROCHLORIDE 1 MG/ML
INJECTION, SOLUTION INTRAMUSCULAR; INTRAVENOUS; SUBCUTANEOUS
Status: COMPLETED
Start: 2025-02-10 | End: 2025-02-10

## 2025-02-10 RX ORDER — NALOXONE HYDROCHLORIDE 0.4 MG/ML
0.08 INJECTION, SOLUTION INTRAMUSCULAR; INTRAVENOUS; SUBCUTANEOUS AS NEEDED
Status: DISCONTINUED | OUTPATIENT
Start: 2025-02-10 | End: 2025-02-10 | Stop reason: HOSPADM

## 2025-02-10 RX ORDER — ROCURONIUM BROMIDE 10 MG/ML
INJECTION, SOLUTION INTRAVENOUS AS NEEDED
Status: DISCONTINUED | OUTPATIENT
Start: 2025-02-10 | End: 2025-02-10 | Stop reason: SURG

## 2025-02-10 RX ORDER — HYDROCODONE BITARTRATE AND ACETAMINOPHEN 10; 325 MG/1; MG/1
1 TABLET ORAL ONCE AS NEEDED
Status: DISCONTINUED | OUTPATIENT
Start: 2025-02-10 | End: 2025-02-10 | Stop reason: HOSPADM

## 2025-02-10 RX ORDER — ASPIRIN 81 MG/1
81 TABLET ORAL DAILY
COMMUNITY

## 2025-02-10 RX ORDER — NEOSTIGMINE METHYLSULFATE 1 MG/ML
INJECTION INTRAVENOUS AS NEEDED
Status: DISCONTINUED | OUTPATIENT
Start: 2025-02-10 | End: 2025-02-10 | Stop reason: SURG

## 2025-02-10 RX ORDER — MIDAZOLAM HYDROCHLORIDE 1 MG/ML
INJECTION INTRAMUSCULAR; INTRAVENOUS AS NEEDED
Status: DISCONTINUED | OUTPATIENT
Start: 2025-02-10 | End: 2025-02-10 | Stop reason: SURG

## 2025-02-10 RX ORDER — ALBUTEROL SULFATE 0.83 MG/ML
2.5 SOLUTION RESPIRATORY (INHALATION) AS NEEDED
Status: DISCONTINUED | OUTPATIENT
Start: 2025-02-10 | End: 2025-02-10 | Stop reason: HOSPADM

## 2025-02-10 RX ORDER — DEXAMETHASONE SODIUM PHOSPHATE 4 MG/ML
VIAL (ML) INJECTION AS NEEDED
Status: DISCONTINUED | OUTPATIENT
Start: 2025-02-10 | End: 2025-02-10 | Stop reason: SURG

## 2025-02-10 RX ORDER — MEPERIDINE HYDROCHLORIDE 25 MG/ML
12.5 INJECTION INTRAMUSCULAR; INTRAVENOUS; SUBCUTANEOUS AS NEEDED
Status: DISCONTINUED | OUTPATIENT
Start: 2025-02-10 | End: 2025-02-10 | Stop reason: HOSPADM

## 2025-02-10 RX ORDER — ACETAMINOPHEN 500 MG
1000 TABLET ORAL EVERY 8 HOURS SCHEDULED
Status: DISCONTINUED | OUTPATIENT
Start: 2025-02-10 | End: 2025-02-12

## 2025-02-10 RX ORDER — ACETAMINOPHEN 500 MG
1000 TABLET ORAL ONCE
Status: DISCONTINUED | OUTPATIENT
Start: 2025-02-10 | End: 2025-02-12

## 2025-02-10 RX ORDER — ONDANSETRON 2 MG/ML
4 INJECTION INTRAMUSCULAR; INTRAVENOUS EVERY 6 HOURS PRN
Status: DISCONTINUED | OUTPATIENT
Start: 2025-02-10 | End: 2025-02-10 | Stop reason: HOSPADM

## 2025-02-10 RX ORDER — LABETALOL HYDROCHLORIDE 5 MG/ML
5 INJECTION, SOLUTION INTRAVENOUS EVERY 5 MIN PRN
Status: DISCONTINUED | OUTPATIENT
Start: 2025-02-10 | End: 2025-02-10 | Stop reason: HOSPADM

## 2025-02-10 RX ORDER — HYDROMORPHONE HYDROCHLORIDE 1 MG/ML
0.8 INJECTION, SOLUTION INTRAMUSCULAR; INTRAVENOUS; SUBCUTANEOUS EVERY 2 HOUR PRN
Status: DISCONTINUED | OUTPATIENT
Start: 2025-02-10 | End: 2025-02-12

## 2025-02-10 RX ADMIN — SODIUM CHLORIDE, SODIUM LACTATE, POTASSIUM CHLORIDE, CALCIUM CHLORIDE: 600; 310; 30; 20 INJECTION, SOLUTION INTRAVENOUS at 14:27:00

## 2025-02-10 RX ADMIN — ROCURONIUM BROMIDE 20 MG: 10 INJECTION, SOLUTION INTRAVENOUS at 16:27:00

## 2025-02-10 RX ADMIN — LABETALOL HYDROCHLORIDE 5 MG: 5 INJECTION, SOLUTION INTRAVENOUS at 15:44:00

## 2025-02-10 RX ADMIN — LABETALOL HYDROCHLORIDE 5 MG: 5 INJECTION, SOLUTION INTRAVENOUS at 17:35:00

## 2025-02-10 RX ADMIN — ONDANSETRON 4 MG: 2 INJECTION INTRAMUSCULAR; INTRAVENOUS at 17:35:00

## 2025-02-10 RX ADMIN — NEOSTIGMINE METHYLSULFATE 5 MG: 1 INJECTION INTRAVENOUS at 17:35:00

## 2025-02-10 RX ADMIN — ROPIVACAINE HYDROCHLORIDE 30 ML: 5 INJECTION, SOLUTION EPIDURAL; INFILTRATION; PERINEURAL at 14:40:00

## 2025-02-10 RX ADMIN — MIDAZOLAM HYDROCHLORIDE 2 MG: 1 INJECTION INTRAMUSCULAR; INTRAVENOUS at 14:29:00

## 2025-02-10 RX ADMIN — SODIUM CHLORIDE, SODIUM LACTATE, POTASSIUM CHLORIDE, CALCIUM CHLORIDE: 600; 310; 30; 20 INJECTION, SOLUTION INTRAVENOUS at 17:16:00

## 2025-02-10 RX ADMIN — ROCURONIUM BROMIDE 20 MG: 10 INJECTION, SOLUTION INTRAVENOUS at 15:00:00

## 2025-02-10 RX ADMIN — LIDOCAINE HYDROCHLORIDE 50 MG: 10 INJECTION, SOLUTION EPIDURAL; INFILTRATION; INTRACAUDAL; PERINEURAL at 14:34:00

## 2025-02-10 RX ADMIN — DEXAMETHASONE SODIUM PHOSPHATE 8 MG: 4 MG/ML VIAL (ML) INJECTION at 15:02:00

## 2025-02-10 RX ADMIN — ROCURONIUM BROMIDE 30 MG: 10 INJECTION, SOLUTION INTRAVENOUS at 14:41:00

## 2025-02-10 RX ADMIN — ROCURONIUM BROMIDE 50 MG: 10 INJECTION, SOLUTION INTRAVENOUS at 14:34:00

## 2025-02-10 RX ADMIN — GLYCOPYRROLATE 0.6 MG: 0.2 INJECTION, SOLUTION INTRAMUSCULAR; INTRAVENOUS at 17:35:00

## 2025-02-10 RX ADMIN — LABETALOL HYDROCHLORIDE 5 MG: 5 INJECTION, SOLUTION INTRAVENOUS at 15:04:00

## 2025-02-10 NOTE — H&P
[facility]  Report of Surgical History and Physical Exam    Jan Babb Patient Status:  Outpatient in a Bed    1955 MRN [id number]   Location [facility] PERIOPERATIVE SERVICE Attending Halley Collier MD   Hosp Day # 0 PCP Obdulia Waldrop MD     Chief Complaint: GERD    History of Present Illness:  Jan Babb is a a(n) 69 year old female who presents for elective gastropexy for GERD.  Since last clinic visit, patient continues to experience significant heartburn symptoms.  She denies any other symptoms.  Her Eliquis and Plavix have been on hold.      History:  Past Medical History:    Allergic rhinitis    Anesthesia complication    DECREASED BREATHING/RESPIRATIONS AFTER PREVIOUS SURGERIES    Anxiety    Anxiety state    Back problem    Benign essential HTN    Cataract    COPD (chronic obstructive pulmonary disease) (HCC)    NO HOME O2    Coronary atherosclerosis    Deep vein thrombosis (HCC)    STENT LEFT LEG    Disorder of liver    FATTY LIVER    Essential hypertension    Always had it    Generalized anxiety disorder with panic attacks    Heart murmur    High blood pressure    High cholesterol    History of blood transfusion    AROUND AGE 19, NO ADVERSE REACTIONS    Hx of motion sickness    Hyperlipidemia    Since I was 17    Osteoarthritis    Other chest pain    Shortness of breath    Sleep apnea    NO LONGER USING CPAP    Thrombophilia (HCC)    Visual impairment    GLASSES       Past Surgical History:   Procedure Laterality Date    Angioplasty (coronary)      Have 3 stents    Appendectomy      Back surgery      Bypass surgery      Cabg  2022    Cataract  2021    right and left with toric     Cath bare metal stent (bms)  2023    X2    Cholecystectomy      Colonoscopy      Elective carotid intervention      Excis lumbar disk,one level      Glaucoma surgery      Hysterectomy      total hyseterectomy and oophorecomty at age 24.     Laparoscopic cholecystectomy             Oophorectomy      Other  01/2022    Lipoma    Other surgical history      back surgery l5s1 fusion in 2006, then screw removed, which caussed spinal fluid leak which was fixed after three surgeries four year aso     Replacement prosthetic aortic valve w/ cardiopulmonary bypass; w/ stent< tissue valve  2023    Spine surgery procedure unlisted      4 back surgeries    Tonsillectomy         Family History   Problem Relation Age of Onset    Lipids Father     Lipids Mother     No Known Problems Daughter     No Known Problems Son     Heart Disease Brother         reports that she quit smoking about 11 years ago. Her smoking use included cigarettes. She has a 13 pack-year smoking history. She has never used smokeless tobacco. She reports that she does not drink alcohol and does not use drugs.      Allergies:  Allergies[1]      Medications:    Current Facility-Administered Medications:     acetaminophen (Tylenol Extra Strength) tab 1,000 mg, 1,000 mg, Oral, Once    lactated ringers infusion, , Intravenous, Continuous    ceFAZolin (Ancef) 2g in 10mL IV syringe premix, 2 g, Intravenous, Once      Review of Systems:  The review of systems was negative other than the above listed HPI and past medical history including the HEENT, Heart, Lungs, GI, , Neuro, Musculoskeletal, Hematologic, Endocrine and Psych.       Physical Exam:  Blood pressure 141/59, pulse 56, temperature 97.3 °F (36.3 °C), temperature source Temporal, resp. rate 18, height 65\", weight 185 lb (83.9 kg), SpO2 100%.  General: Alert, orientated x3.  Cooperative.  No apparent distress.  HEENT: Exam is unremarkable.  Without scleral icterus.  Mucous membranes are moist. EOM are WNL.  Neck: No tenderness to palpitation.  Full range of motion to flexion and extension, lateral rotation and lateral flexion of cervical spine.  No JVD. Supple.   Lungs: Bilateral chest rise. Good excursion of the diaphragms. No secondary use of accessory respiratory  musculature.  Cardiac: Regular rate and rhythm. No murmur.  Abdomen: Soft, nontender, nondistended  Extremities:  No lower extremity edema noted.  Without clubbing or cyanosis.  2+ pulses x4  Skin: Normal texture and turgor.      Laboratory Data and Relevant Imaging:  No results for input(s): \"RBC\", \"HGB\", \"HCT\", \"MCV\", \"MCH\", \"MCHC\", \"RDW\", \"NEPRELIM\", \"WBC\", \"PLT\" in the last 168 hours.    No results for input(s): \"GLU\", \"BUN\", \"CREATSERUM\", \"GFRAA\", \"GFRNAA\", \"CA\", \"ALB\", \"NA\", \"K\", \"CL\", \"CO2\", \"ALKPHO\", \"AST\", \"ALT\", \"BILT\", \"TP\" in the last 168 hours.      No results for input(s): \"PTP\", \"INR\", \"PTT\" in the last 168 hours.    Imaging  None      Impression/Plan:  Patient Active Problem List   Diagnosis    Post-menopausal    Dyslipidemia    High triglycerides    High serum low-density lipoprotein (LDL)    Arthralgia of left acromioclavicular joint    Cervical disc disease    Heart murmur    Lumbar disc disease    Chronic back pain    COPD (chronic obstructive pulmonary disease) ([facility])    Generalized anxiety disorder with panic attacks    Inflammatory spondylopathy, unspecified spinal region    Neck mass    Deep vein thrombosis (DVT) of left lower extremity, unspecified chronicity, unspecified vein ([facility])    Anemia    Hyperglycemia    Acute deep vein thrombosis (DVT) of femoral vein of left lower extremity (HCC)    Tobacco use disorder    Hospital discharge follow-up    Current use of long term anticoagulation    CAROLYN on CPAP    Atherosclerosis of aorta    Coronary artery disease involving native coronary artery of native heart without angina pectoris    Ectatic abdominal aorta    Opioid type dependence, abuse ([facility])    Stage 3a chronic kidney disease (HCC)    Thrombophilia (HCC)    Pulmonary emphysema (HCC)    Fatty liver    Hiatal hernia    Non-recurrent bilateral inguinal hernia without obstruction or gangrene    Prediabetes    Pre-op testing    Benign essential HTN    Gastroesophageal reflux disease with  esophagitis       69 year old female with GERD and hiatal hernia    Will proceed as scheduled with gastropexy  Patient will be admitted postoperatively for medical comorbidities  All questions answered    Thank you for letting me participate in the care of this patient    Halley Collier MD  [facility] General Surgery  2/10/2025  1:41 PM           [1]   Allergies  Allergen Reactions    Moxifloxacin Hcl In Nacl SHORTNESS OF BREATH    Statins OTHER (SEE COMMENTS)     Bone pain     Venlafaxine OTHER (SEE COMMENTS)     Unknown     Ketorolac Tromethamine ANXIETY    Pregabalin OTHER (SEE COMMENTS)    Trazodone OTHER (SEE COMMENTS)     \"HYPER\"    Amitriptyline FATIGUE    Risperidone NAUSEA ONLY

## 2025-02-10 NOTE — OPERATIVE REPORT
OPERATIVE NOTE    Jan Babb Location: OR   CSN [id number] MRN [id number]   Admission Date 2/10/2025 Operation Date 2/10/2025   Attending Physician Halley Collier MD Operating Physician Halley Collier MD     PREOPERATIVE DIAGNOSIS  Hiatal hernia with GERD    POSTOPERATIVE DIAGNOSIS  Same    PROCEDURE PERFORMED  Laparoscopic hiatal hernia repair with gastropexy    SURGEON  Halley Collier MD    ASSISTANTS  Kingston Gallo MD his assistance was absolutely necessary for the conduct of this case especially in the careful dissection of the hiatus, repair of the hiatal hernia, and gastropexy    ANESTHESIA  General    INDICATIONS  This is a 69-year-old woman who presents outpatient setting with intractable GERD.  Patient was treated with famotidine but continued to have persistent and severe symptoms.  On imaging and upper GI, she was found to have a small hiatal hernia with reflux.  Gastropexy for symptomatic relief was indicated.    FINDINGS   Hiatal hernia adequately dissected and repaired, gastropexy of the greater curvature to the anterior abdominal wall under tension    FINDINGS/DESCRIPTION OF PROCEDURE  After informed consent, patient was brought to the operating room and placed in supine position. Bilateral SCDs were placed and pre-operative antibiotics administered. Anesthesia was induced without any complication.  Bustillos catheter was placed.  Patient was prepped and draped in the usual sterile fashion. Time out was performed confirming patient, procedure, and site.     Veress needle was used to gain pneumoperitoneum.  Using 15 blade, an incision was made at the epigastric.  Veress needle was inserted with audible clicks. On aspiration, there was no bowel contents or blood.  Saline flowed easily confirming presence in the abdomen. Pneumoperitoneum was created with CO2.  Proposed incisions were marked along the upper abdomen.  An 5 mm port was placed under Optiview at the left upper quadrant, clearly seeing all  layers of the abdominal wall.  Upon entrance into the abdomen, no injury to the underlying bowel or omentum was noted at the site of the entrance or Veress needle.  Three more ports were placed under direct visualization, triangulating the stomach.  The left upper quadrant port was upsized to a 12 mm port.  Finally, the Carlos retractor was placed at the epigastric incision.  Under direct visualization, the Carlos retractor was placed under the left lobe of the liver.  Patient was positioned in the steep reverse Trendelenburg.     Patient had a small hiatal hernia that contained a small portion of her stomach.  Stomach was reduced into the abdomen but was attached to the sac and hiatus, making gastropexy difficult.  At this time, I decided to dissect the sac to mobilize the stomach allowing for an adequate gastropexy. I first started with dissection of the left alice.  The left alice was identified and the hernia sac dissected off of the edge.  Dissection was carried superiorly into the chest and posteriorly under the stomach and esophagus. Posterior attachments and short gastrics to the stomach were taken down with the LigaSure until the edge of the left alice was identified.  Once the left alice was free, I then proceeded with dissection of the right alice.  The edge of the alice was identified and hernia sac dissected off of it.  Dissection was accomplished anteriorly and posteriorly until the left alice was identified.  Once the posterior dissection was done, I placed a Penrose drain to help with retraction of the stomach. Once the stomach and GE junction were reduced into the abdominal cavity, plan was to continue with the gastropexy but patient was doing well maintaining vitals with insufflation.  Because patient was doing well and hernia was completely dissected, I proceeded with repair of the hernia defect.    The right and left crura were closed with a 2-0 silk figure of 8 suture posterior to the esophagus.   After 3 posterior sutures, there was still a gap anteriorly.  I placed a fourth 2-0 silk figure-of-eight suture anteriorly, closing the defect.  Satisfied with the repair, the Penrose drain was removed from the abdominal cavity.  I then chose 2 places on the greater curvature for the gastropexy.  I then placed a figure-of-eight stitch at these places using a 2-0 Vicryl. The costal margin was identified and an incision made just inferior.  Tunneling through this incision, I used a Amos Pedraza to grasp the tails of each suture.  Pneumoperitoneum was released to make sure there was enough tension for an adequate gastropexy and that the stomach reached the anterior abdominal wall.  Satisfied with the gastropexy, I decided to end the case.  The 12 mm port was closed with the Amos Pedraza with a #1 PDS suture under direct visualization.  The Carlos retractor was removed under direct visualization.  The liver was bruised from the retractor but was otherwise without injury. Pneumoperitoneum was evacuated and all ports removed.  Skin incisions were closed with 4-0 Monocryl.  Wounds were washed and dressed with Dermabond.     At the end of the case, all counts were correct. Patient tolerated procedure well. Patient was awakened and transferred to PACU in a hemodynamically stable condition.    SPECIMENS REMOVED  None    ESTIMATED BLOOD LOSS  20 ml    COMPLICATIONS  None     Halley Collier MD

## 2025-02-10 NOTE — ANESTHESIA PREPROCEDURE EVALUATION
PRE-OP EVALUATION    Patient Name: Jan Babb    Admit Diagnosis: Hiatal hernia with GERD [K44.9, K21.9]  Case ID: [id number] pls add cpt code 10084    Pre-op Diagnosis: Hiatal hernia with GERD [K44.9, K21.9]  Case ID: [id number] pls add cpt code 14549    LAPAROSCOPIC GASTROPEXY, POSSIBLE OPEN    Anesthesia Procedure: LAPAROSCOPIC GASTROPEXY, POSSIBLE OPEN    Surgeons and Role:     * Halley Collier MD - Primary     * Kingston Gallo MD - Assisting Surgeon    Pre-op vitals reviewed.  Temp: 97.3 °F (36.3 °C)  Pulse: 56  Resp: 18  BP: 141/59  SpO2: 100 %  Body mass index is 30.79 kg/m².    Current medications reviewed.  Hospital Medications:   [Transfer Hold] acetaminophen (Tylenol Extra Strength) tab 1,000 mg  1,000 mg Oral Once    lactated ringers infusion   Intravenous Continuous    ceFAZolin (Ancef) 2g in 10mL IV syringe premix  2 g Intravenous Once       Outpatient Medications:   Prescriptions Prior to Admission[1]    Allergies: Moxifloxacin hcl in nacl, Statins, Venlafaxine, Ketorolac tromethamine, Pregabalin, Trazodone, Amitriptyline, and Risperidone      Anesthesia Evaluation    Patient summary reviewed.    Anesthetic Complications  (+) history of anesthetic complications         GI/Hepatic/Renal             (-) chronic renal disease   (+) liver disease                 Cardiovascular      ECG reviewed.  Exercise tolerance: good     MET: >4    (+) obesity  (+) hypertension     (+) CAD    (+) CABG/stent    (-) valvular problems/murmurs     (-) dysrhythmias   (-) CHF  (-) angina     (-) COBURN         Endo/Other               (-) anemia        (-) thrombocytopenia           Pulmonary      (-) asthma  (+) COPD       (+) shortness of breath     (+) sleep apnea       Neuro/Psych          (-) CVA       (+) neuromuscular disease                     Past Surgical History:   Procedure Laterality Date    Angioplasty (coronary)  2022    Have 3 stents    Appendectomy      Back surgery      Bypass surgery      Cabg  07/2022     Cataract  2021    right and left with toric     Cath bare metal stent (bms)  2023    X2    Cholecystectomy      Colonoscopy      Elective carotid intervention      Excis lumbar disk,one level      Glaucoma surgery      Hysterectomy      total hyseterectomy and oophorecomty at age 24.     Laparoscopic cholecystectomy            Oophorectomy      Other  2022    Lipoma    Other surgical history      back surgery l5s1 fusion in , then screw removed, which caussed spinal fluid leak which was fixed after three surgeries four year aso     Replacement prosthetic aortic valve w/ cardiopulmonary bypass; w/ stent< tissue valve      Spine surgery procedure unlisted      4 back surgeries    Tonsillectomy       Social History     Socioeconomic History    Marital status:    Tobacco Use    Smoking status: Former     Current packs/day: 0.00     Average packs/day: 1 pack/day for 13.0 years (13.0 ttl pk-yrs)     Types: Cigarettes     Quit date:      Years since quittin.1    Smokeless tobacco: Never    Tobacco comments:     No longer smokes   Vaping Use    Vaping status: Never Used   Substance and Sexual Activity    Alcohol use: Never    Drug use: Never   Other Topics Concern    Caffeine Concern No    Stress Concern No    Weight Concern Yes    Special Diet Yes    Exercise Yes    Seat Belt Yes     History   Drug Use Unknown     Available pre-op labs reviewed.  Lab Results   Component Value Date    WBC 6.0 2025    RBC 4.89 2025    HGB 14.1 2025    HCT 43.1 2025    MCV 88.1 2025    MCH 28.8 2025    MCHC 32.7 2025    RDW 12.6 2025    .0 2025     Lab Results   Component Value Date     2025    K 4.6 2025     2025    CO2 28.0 2025    BUN 11 2025    CREATSERUM 0.98 2025     (H) 2025    CA 9.8 2025            Airway      Mallampati: II  Mouth opening: >3 FB    Neck ROM: full  Cardiovascular    Cardiovascular exam normal.         Dental             Pulmonary    Pulmonary exam normal.                 Other findings              ASA: 3   Plan: general  NPO status verified and     Post-procedure pain management plan discussed with surgeon and patient.  Surgeon requests: regional block  Comment: Bilateral TAP blocks  Plan/risks discussed with: patient  Use of blood product(s) discussed with: patient    Consented to blood products.          Present on Admission:  **None**             [1]   Medications Prior to Admission   Medication Sig Dispense Refill Last Dose/Taking    aspirin 81 MG Oral Tab EC Take 1 tablet (81 mg total) by mouth daily.   2/9/2025 at  7:00 PM    famotidine 20 MG Oral Tab Take 1 tablet (20 mg total) by mouth 2 (two) times daily as needed for Heartburn. 180 tablet 0 Past Month    REPATHA SURECLICK 140 MG/ML Subcutaneous Solution Auto-injector 140 mg every 14 (fourteen) days.   Past Week    ezetimibe 10 MG Oral Tab Take 1 tablet (10 mg total) by mouth nightly.   2/9/2025 at  7:00 PM    clopidogrel 75 MG Oral Tab Take 1 tablet (75 mg total) by mouth daily.   2/3/2025    rivaroxaban (XARELTO) 10 MG Oral Tab Take 1 tablet (10 mg total) by mouth daily with food.   2/6/2025    tiZANidine HCl 2 MG Oral Cap Take 1 capsule (2 mg total) by mouth 2 (two) times daily as needed for Muscle spasms.   2/9/2025 at  7:00 PM    metoprolol succinate ER 25 MG Oral Tablet 24 Hr Take 1 tablet (25 mg total) by mouth daily.   2/9/2025 at  8:00 PM    furosemide (LASIX) 20 MG Oral Tab Take 0.5 tablets (10 mg total) by mouth daily as needed.   More than a month

## 2025-02-10 NOTE — ANESTHESIA PROCEDURE NOTES
Airway  Date/Time: 2/10/2025 2:35 PM  Urgency: elective    Airway not difficult    General Information and Staff    Patient location during procedure: OR  Anesthesiologist: Mesfin Langford MD  Performed: anesthesiologist   Performed by: Mesfin Langford MD  Authorized by: Mesfin Langford MD      Indications and Patient Condition  Indications for airway management: anesthesia  Spontaneous Ventilation: absent  Sedation level: deep  Preoxygenated: yes  Patient position: sniffing  Mask difficulty assessment: 0 - not attempted    Final Airway Details  Final airway type: endotracheal airway      Successful airway: ETT  Cuffed: yes   Successful intubation technique: direct laryngoscopy  Facilitating devices/methods: intubating stylet  Endotracheal tube insertion site: oral  Blade: Swetha  Blade size: #3  ETT size (mm): 7.0    Cormack-Lehane Classification: grade IIA - partial view of glottis  Placement verified by: capnometry   Cuff volume (mL): 9  Measured from: lips  ETT to lips (cm): 22  Number of attempts at approach: 1

## 2025-02-10 NOTE — ANESTHESIA PROCEDURE NOTES
Regional Block    Date/Time: 2/10/2025 2:40 PM    Performed by: Mesfin Langford MD  Authorized by: Mesfin Langford MD      General Information and Staff    Start Time:  2/10/2025 2:40 PM  End Time:  2/10/2025 2:40 PM  Anesthesiologist:  Mesfin Langford MD  Performed by:  Anesthesiologist  Patient Location:  OR    Block Placement: Post Induction  Site Identification: real time ultrasound guided and image stored and retrievable    Block site/laterality marked before start: site marked  Reason for Block: at surgeon's request and post-op pain management    Preanesthetic Checklist: 2 patient identifers, IV checked, risks and benefits discussed, monitors and equipment checked, pre-op evaluation, timeout performed, anesthesia consent, sterile technique used, no prohibitive neurological deficits and no local skin infection at insertion site      Procedure Details    Patient Position:  Supine  Prep: ChloraPrep    Monitoring:  Cardiac monitor, continuous pulse ox, blood pressure cuff and heart rate  Block Type:  TAP  Laterality:  Bilateral  Injection Technique:  Single-shot    Needle    Needle Type:  Short-bevel and echogenic  Needle Gauge:  21 G  Needle Length:  110 mm  Needle Localization:  Ultrasound guidance  Reason for Ultrasound Use: appropriate spread of the medication was noted in real time and no ultrasound evidence of intravascular and/or intraneural injection            Assessment    Injection Assessment:  Good spread noted, negative resistance, negative aspiration for heme, incremental injection and low pressure  Heart Rate Change: No    - Patient tolerated block procedure well without evidence of immediate block related complications.     Medications  2/10/2025 2:40 PM      Additional Comments    Medication:  Ropivacaine 0.5% 30mL

## 2025-02-11 LAB
ANION GAP SERPL CALC-SCNC: 11 MMOL/L (ref 0–18)
BASOPHILS # BLD AUTO: 0.02 X10(3) UL (ref 0–0.2)
BASOPHILS NFR BLD AUTO: 0.2 %
BUN BLD-MCNC: 8 MG/DL (ref 9–23)
CALCIUM BLD-MCNC: 9.5 MG/DL (ref 8.7–10.6)
CHLORIDE SERPL-SCNC: 105 MMOL/L (ref 98–112)
CO2 SERPL-SCNC: 22 MMOL/L (ref 21–32)
CREAT BLD-MCNC: 0.89 MG/DL
EGFRCR SERPLBLD CKD-EPI 2021: 70 ML/MIN/1.73M2 (ref 60–?)
EOSINOPHIL # BLD AUTO: 0 X10(3) UL (ref 0–0.7)
EOSINOPHIL NFR BLD AUTO: 0 %
ERYTHROCYTE [DISTWIDTH] IN BLOOD BY AUTOMATED COUNT: 13 %
GLUCOSE BLD-MCNC: 135 MG/DL (ref 70–99)
HCT VFR BLD AUTO: 39.4 %
HGB BLD-MCNC: 12.9 G/DL
IMM GRANULOCYTES # BLD AUTO: 0.05 X10(3) UL (ref 0–1)
IMM GRANULOCYTES NFR BLD: 0.4 %
LYMPHOCYTES # BLD AUTO: 0.71 X10(3) UL (ref 1–4)
LYMPHOCYTES NFR BLD AUTO: 6.3 %
MCH RBC QN AUTO: 28.5 PG (ref 26–34)
MCHC RBC AUTO-ENTMCNC: 32.7 G/DL (ref 31–37)
MCV RBC AUTO: 87.2 FL
MONOCYTES # BLD AUTO: 0.89 X10(3) UL (ref 0.1–1)
MONOCYTES NFR BLD AUTO: 7.9 %
NEUTROPHILS # BLD AUTO: 9.55 X10 (3) UL (ref 1.5–7.7)
NEUTROPHILS # BLD AUTO: 9.55 X10(3) UL (ref 1.5–7.7)
NEUTROPHILS NFR BLD AUTO: 85.2 %
OSMOLALITY SERPL CALC.SUM OF ELEC: 286 MOSM/KG (ref 275–295)
PLATELET # BLD AUTO: 185 10(3)UL (ref 150–450)
POTASSIUM SERPL-SCNC: 4.5 MMOL/L (ref 3.5–5.1)
RBC # BLD AUTO: 4.52 X10(6)UL
SODIUM SERPL-SCNC: 138 MMOL/L (ref 136–145)
WBC # BLD AUTO: 11.2 X10(3) UL (ref 4–11)

## 2025-02-11 PROCEDURE — 99214 OFFICE O/P EST MOD 30 MIN: CPT | Performed by: INTERNAL MEDICINE

## 2025-02-11 RX ORDER — ENOXAPARIN SODIUM 100 MG/ML
40 INJECTION SUBCUTANEOUS DAILY
Status: DISCONTINUED | OUTPATIENT
Start: 2025-02-11 | End: 2025-02-12

## 2025-02-11 RX ORDER — DOCUSATE SODIUM 100 MG/1
100 CAPSULE, LIQUID FILLED ORAL 2 TIMES DAILY
Status: DISCONTINUED | OUTPATIENT
Start: 2025-02-11 | End: 2025-02-12

## 2025-02-11 RX ORDER — SODIUM PHOSPHATE, DIBASIC AND SODIUM PHOSPHATE, MONOBASIC 7; 19 G/230ML; G/230ML
1 ENEMA RECTAL ONCE AS NEEDED
Status: DISCONTINUED | OUTPATIENT
Start: 2025-02-11 | End: 2025-02-12

## 2025-02-11 RX ORDER — POLYETHYLENE GLYCOL 3350 17 G/17G
17 POWDER, FOR SOLUTION ORAL DAILY PRN
Status: DISCONTINUED | OUTPATIENT
Start: 2025-02-11 | End: 2025-02-12

## 2025-02-11 RX ORDER — BISACODYL 10 MG
10 SUPPOSITORY, RECTAL RECTAL
Status: DISCONTINUED | OUTPATIENT
Start: 2025-02-11 | End: 2025-02-12

## 2025-02-11 NOTE — PROGRESS NOTES
[facility]  Progress Note    Jan Babb Patient Status:  Outpatient in a Bed    1955 MRN [id number]   Location [facility] 3NW-A Attending Halley Collier MD   Hosp Day # 0 PCP Obdulia Waldrop MD     Subjective:  The patient was seen and examined at bedside. She reports pain in her left upper quadrant. She is tolerating clear liquids. She reports mild nausea well controlled with medications. She denies dysphagia or odynophagia.     Objective/Physical Exam:  BP (!) 162/76 (BP Location: Right arm)   Pulse 62   Temp 98.8 °F (37.1 °C) (Oral)   Resp 18   Ht 65\"   Wt 185 lb (83.9 kg)   SpO2 96%   BMI 30.79 kg/m²     Intake/Output Summary (Last 24 hours) at 2025 08  Last data filed at 2025 0749  Gross per 24 hour   Intake 1780 ml   Output 755 ml   Net 1025 ml         General: Alert, oriented x3. No acute distress.  HEENT: Normocephalic, atraumatic. No scleral icterus.  Pulmonary: No respiratory distress, effort normal.   Abdomen: Mildly-distended, without tympany to percussion. Soft, incisional site tenderness to palpation. No rebound or guarding. No peritoneal signs. LUQ hematoma   Incision: clean, dry, intact with surrounding ecchymosis. Skin glue in place  Extremities: No lower extremity edema. No clubbing or cyanosis.   Skin: Warm, dry. No jaundice.       Labs:  Lab Results   Component Value Date    WBC 11.2 2025    HGB 12.9 2025    HCT 39.4 2025    .0 2025      Lab Results   Component Value Date    INR 1.05 2024    INR 2.16 (H) 09/10/2024    INR 1.31 (H) 2022     Lab Results   Component Value Date     2025    K 4.5 2025     2025    CO2 22.0 2025    BUN 8 2025    CREATSERUM 0.89 2025     2025    CA 9.5 2025          Assessment:  Patient Active Problem List   Diagnosis    Post-menopausal    Dyslipidemia    High triglycerides    High serum low-density lipoprotein (LDL)     Arthralgia of left acromioclavicular joint    Cervical disc disease    Heart murmur    Lumbar disc disease    Chronic back pain    COPD (chronic obstructive pulmonary disease) (HCC)    Generalized anxiety disorder with panic attacks    Inflammatory spondylopathy, unspecified spinal region    Neck mass    Deep vein thrombosis (DVT) of left lower extremity, unspecified chronicity, unspecified vein (HCC)    Anemia    Hyperglycemia    Acute deep vein thrombosis (DVT) of femoral vein of left lower extremity (HCC)    Tobacco use disorder    Hospital discharge follow-up    Current use of long term anticoagulation    CAROLYN on CPAP    Atherosclerosis of aorta    Coronary artery disease involving native coronary artery of native heart without angina pectoris    Ectatic abdominal aorta    Opioid type dependence, abuse (HCC)    Stage 3a chronic kidney disease (HCC)    Thrombophilia (HCC)    Pulmonary emphysema (HCC)    Fatty liver    Hiatal hernia    Non-recurrent bilateral inguinal hernia without obstruction or gangrene    Prediabetes    Pre-op testing    Benign essential HTN    Gastroesophageal reflux disease with esophagitis     POD 1 laparoscopic hiatal hernia repair with gastropexy    Plan:  Continue clear liquid diet. Advance diet as tolerated  Antiemetics and analgesics as needed-Lidocaine patch ordered to apply to the LUQ  Encourage ambulation and up to chair-PT consulted  Encourage incentive spirometer  Bowel regimen  DVT prophylaxis with lovenox  GI prophylaxis with pepcid      The patient was discussed with Dr. Collier , and she is in agreement with the assessment and plan. My total face time with this patient was 25 minutes. Greater than half of the visit was spent in counseling the patient on the above listed diagnoses and treatment options.     Myriam Reyes PA-C  2/11/2025  8:25 AM    This note was initiated by Myriam Reyes.  The PA saw the patient in conjunction with me. The PA performed a  history, exam, and developed the assessment and plan in conjunction with me. I agree with the above note and have made changes which reflect my own history and physical, if necessary.    Having some pain at the gastropexy site  Complaining of some nausea but tolerating clears  No flatus or bowel movements yet  On physical exam, some swelling at the gastropexy site, possible hematoma forming  Incisions are otherwise appropriately tender  Continue clear liquids and advance diet as tolerated  Multimodal pain control with lidocaine patches, muscle relaxants, Tylenol, and narcotics as needed  Bowel regimen  PT for ambulation and mobilization  Hospitalist on board for medical management, appreciate their recommendations  Anticipate discharge once pain is controlled and patient is tolerating diet  DVT prophylactics    Halley Collier MD  [facility]  2/11/2025  8:44 AM

## 2025-02-11 NOTE — CONSULTS
[place] [facility]  CONSULT     Jan Babb Patient Status:  Outpatient in a Bed    1955 MRN [id number]   Location [facility] 3NW-A Attending Halley Collier MD   Hosp Day # 0 PCP Obdulia Waldrop MD     Reason for consult: Medical management    Requested by: Dr. Collier    Subjective:   History of Present Illness:     Jan Babb is a 69 year old female with past medical history significant for COPD, HTN, DL, anxiety, CAD, CAROLYN, GERD, hiatal hernia was admitted following an elective laparoscopic hiatal hernia repair with gastropexy.  Pt is currently post op day 0.  She tolerated the procedure well.  She is c/o burning pain at the site of surgery.  She denies chest pain, nausea, vomiting, fever, chills, shortness of breath, lightheadedness or dizziness.    History/Other:    Past Medical History:  Past Medical History:    Allergic rhinitis    Anesthesia complication    DECREASED BREATHING/RESPIRATIONS AFTER PREVIOUS SURGERIES    Anxiety    Anxiety state    Back problem    Benign essential HTN    Cataract    COPD (chronic obstructive pulmonary disease) (HCC)    NO HOME O2    Coronary atherosclerosis    Deep vein thrombosis (HCC)    STENT LEFT LEG    Disorder of liver    FATTY LIVER    Essential hypertension    Always had it    Generalized anxiety disorder with panic attacks    Heart murmur    High blood pressure    High cholesterol    History of blood transfusion    AROUND AGE 19, NO ADVERSE REACTIONS    Hx of motion sickness    Hyperlipidemia    Since I was 17    Osteoarthritis    Other chest pain    Shortness of breath    Sleep apnea    NO LONGER USING CPAP    Thrombophilia (HCC)    Visual impairment    GLASSES     Past Surgical History:   Past Surgical History:   Procedure Laterality Date    Angioplasty (coronary)      Have 3 stents    Appendectomy      Back surgery      Bypass surgery      Cabg  2022    Cataract  2021    right and left with toric     Cath bare metal stent  (bms)  2023    X2    Cholecystectomy      Colonoscopy      Elective carotid intervention      Excis lumbar disk,one level      Glaucoma surgery      Hysterectomy      total hyseterectomy and oophorecomty at age 24.     Laparoscopic cholecystectomy            Oophorectomy      Other  2022    Lipoma    Other surgical history      back surgery l5s1 fusion in , then screw removed, which caussed spinal fluid leak which was fixed after three surgeries four year aso     Replacement prosthetic aortic valve w/ cardiopulmonary bypass; w/ stent< tissue valve      Spine surgery procedure unlisted      4 back surgeries    Tonsillectomy        Family History:   Family History   Problem Relation Age of Onset    Lipids Father     Lipids Mother     No Known Problems Daughter     No Known Problems Son     Heart Disease Brother      Social History:    reports that she quit smoking about 11 years ago. Her smoking use included cigarettes. She has a 13 pack-year smoking history. She has never used smokeless tobacco. She reports that she does not drink alcohol and does not use drugs.     Allergies: Allergies[1]    Medications:  Medications Ordered Prior to Encounter[2]    Review of Systems:   A comprehensive review of systems was completed.    Pertinent positives and negatives noted in the HPI.    Objective:   Physical Exam:    /81 (BP Location: Right arm)   Pulse 81   Temp 98.2 °F (36.8 °C) (Oral)   Resp 19   Ht 5' 5\" (1.651 m)   Wt 185 lb (83.9 kg)   SpO2 94%   BMI 30.79 kg/m²   General: No acute distress, Alert  Respiratory: No rhonchi, no wheezes  Cardiovascular: S1, S2. Regular rate and rhythm  Abdomen: Soft, TTP in LUQ  Neuro: No new focal deficits  Extremities: No edema      Results:    Labs:      Labs Last 24 Hours:  No results for input(s): \"WBC\", \"HGB\", \"MCV\", \"PLT\", \"BAND\", \"INR\" in the last 168 hours.    Invalid input(s): \"LYM#\", \"MONO#\", \"BASOS#\", \"EOSIN#\"    No results for input(s): \"GLU\",  \"BUN\", \"CREATSERUM\", \"GFRAA\", \"GFRNAA\", \"CA\", \"ALB\", \"NA\", \"K\", \"CL\", \"CO2\", \"ALKPHO\", \"AST\", \"ALT\", \"BILT\", \"TP\" in the last 168 hours.    No results for input(s): \"PTP\", \"INR\" in the last 168 hours.    No results for input(s): \"TROP\", \"CK\" in the last 168 hours.      Imaging: Imaging data reviewed in Epic.    Assessment & Plan:      #s/p laparoscopic hiatal hernia repair with gastropexy  Pain control  Diet per surgery    #HTN, controlled    #DL, Zetia    #COPD, stable    #CAD,s table    #CAROLYN    #Obesity, BMI 30        Plan of care discussed with pt and RN.    Luis Demarco MD  2/10/2025    The 21st Century Cures Act makes medical notes like these available to patients in the interest of transparency. Please be advised this is a medical document. Medical documents are intended to carry relevant information, facts as evident, and the clinical opinion of the practitioner. The medical note is intended as peer to peer communication and may appear blunt or direct. It is written in medical language and may contain abbreviations or verbiage that are unfamiliar.            [1]   Allergies  Allergen Reactions    Moxifloxacin Hcl In Nacl SHORTNESS OF BREATH    Statins OTHER (SEE COMMENTS)     Bone pain     Venlafaxine OTHER (SEE COMMENTS)     Unknown     Ketorolac Tromethamine ANXIETY    Pregabalin OTHER (SEE COMMENTS)    Trazodone OTHER (SEE COMMENTS)     \"HYPER\"    Amitriptyline FATIGUE    Risperidone NAUSEA ONLY   [2]   No current facility-administered medications on file prior to encounter.     Current Outpatient Medications on File Prior to Encounter   Medication Sig Dispense Refill    aspirin 81 MG Oral Tab EC Take 1 tablet (81 mg total) by mouth daily.      REPATHA SURECLICK 140 MG/ML Subcutaneous Solution Auto-injector 140 mg every 14 (fourteen) days.      ezetimibe 10 MG Oral Tab Take 1 tablet (10 mg total) by mouth nightly.      clopidogrel 75 MG Oral Tab Take 1 tablet (75 mg total) by mouth daily.      rivaroxaban  (XARELTO) 10 MG Oral Tab Take 1 tablet (10 mg total) by mouth daily with food.      tiZANidine HCl 2 MG Oral Cap Take 1 capsule (2 mg total) by mouth 2 (two) times daily as needed for Muscle spasms.      metoprolol succinate ER 25 MG Oral Tablet 24 Hr Take 1 tablet (25 mg total) by mouth daily.      furosemide (LASIX) 20 MG Oral Tab Take 0.5 tablets (10 mg total) by mouth daily as needed.

## 2025-02-11 NOTE — PHYSICAL THERAPY NOTE
PHYSICAL THERAPY EVALUATION - INPATIENT     Room Number: 309/309-A  Evaluation Date: 2025  Type of Evaluation: Initial  Physician Order: PT Eval and Treat    Presenting Problem: Laparoscopic hiatal hernia repair with gastropexy  Co-Morbidities : COPD, HTN, DL, anxiety, CAD, CAROLYN, GERD  Reason for Therapy: Mobility Dysfunction and Discharge Planning    PROCEDURE PERFORMED 2/10/25  Laparoscopic hiatal hernia repair with gastropexy  SURGEON: Halley Collier MD    PHYSICAL THERAPY ASSESSMENT   Patient is a 69 year old female admitted 2/10/2025 for planned Laparoscopic hiatal hernia repair with gastropexy.   Patient is currently functioning near baseline with bed mobility, transfers, gait, and stair negotiation. Prior to admission, patient's baseline is independent.     Patient no longer requires IP PT but will benefit from increased support at home.    PLAN  Patient has been evaluated and presents with no skilled Physical Therapy needs at this time.  Patient discharged from Physical Therapy services.  Please re-order if a new functional limitation presents during this admission.    PT Device Recommendation: Rolling walker    GOALS  Patient was able to achieve the following goals ...    Patient was able to transfer Safely with RW   Patient able to ambulate on level surfaces Safely with RW     HOME SITUATION  Type of Home: House  Home Layout: One level                     Lives With: Spouse    Drives: Yes   Patient Regularly Uses: Glasses     Prior Level of [place]: Pt lives in a one level house with her . Pt drives and was independent at baseline. Pt owns a rolling walker.     SUBJECTIVE  \"We need to turn around. My pain is bad\"    OBJECTIVE  Precautions: Abdominal protective strategies;HOB elevated 30 degrees;Bed/chair alarm;Other (Comment) (abdominal binder)  Fall Risk: Standard fall risk    WEIGHT BEARING RESTRICTION     PAIN ASSESSMENT  Ratin  Location: abdomen  Management Techniques: Activity  promotion;Body mechanics;Repositioning;Breathing techniques    COGNITION  Overall Cognitive Status:  WFL - within functional limits    RANGE OF MOTION AND STRENGTH ASSESSMENT  Upper extremity ROM and strength are within functional limits   Lower extremity ROM is within functional limits   Lower extremity strength is within functional limits     BALANCE  Static Sitting: Good  Dynamic Sitting: Good  Static Standing: Fair -  Dynamic Standing: Fair -    ADDITIONAL TESTS                                    ACTIVITY TOLERANCE                         O2 WALK       NEUROLOGICAL FINDINGS                        AM-PAC '6-Clicks' INPATIENT SHORT FORM - BASIC MOBILITY  How much difficulty does the patient currently have...  Patient Difficulty: Turning over in bed (including adjusting bedclothes, sheets and blankets)?: A Little   Patient Difficulty: Sitting down on and standing up from a chair with arms (e.g., wheelchair, bedside commode, etc.): A Little   Patient Difficulty: Moving from lying on back to sitting on the side of the bed?: A Little   How much help from another person does the patient currently need...   Help from Another: Moving to and from a bed to a chair (including a wheelchair)?: A Little   Help from Another: Need to walk in hospital room?: A Little   Help from Another: Climbing 3-5 steps with a railing?: A Little       AM-PAC Score:  Raw Score: 18   Approx Degree of Impairment: 46.58%   Standardized Score (AM-PAC Scale): 43.63   CMS Modifier (G-Code): CK    FUNCTIONAL ABILITY STATUS  Gait Assessment   Functional Mobility/Gait Assessment  Gait Assistance: Supervision  Distance (ft): 150  Assistive Device: Rolling walker  Pattern: Within Functional Limits    Skilled Therapy Provided   Educated on importance of continued out of bed mobility and ambulation  Educated on log roll for bed mobility  Educated on abdominal protective strategies    Bed mobility> sit to stand> ambulated 150 feet with RW> in bed at end of  session    Bed Mobility:  Rolling: supervision, increased time due to pain  Supine to sit: supervision, increased time due to pain   Sit to supine: supervision, increased time due to pain     Transfer Mobility:  Sit to stand: supervision to RW   Stand to sit: Supervision to RW  Gait = Supervision 150 feet. Decreased barrett due to pain. No other gait deviations or LOB noted.    Therapist's comments: RN gave clearance to see patient. Discussed role and goal of physical therapy in hospital setting. Pt in agreement to session.     Exercise/Education Provided:  Bed mobility  Body mechanics  Energy conservation  Functional activity tolerated  Gait training  Transfer training    Patient End of Session: In bed;Needs met;Call light within reach;RN aware of session/findings;All patient questions and concerns addressed;Hospital anti-slip socks;Family present    Patient Evaluation Complexity Level:  History Low - no personal factors and/or co-morbidities   Examination of body systems Low -  addressing 1-2 elements   Clinical Presentation Low- Stable   Clinical Decision Making Low Complexity       PT Session Time: 30 minutes  Therapeutic Activity: 20 minutes

## 2025-02-11 NOTE — PROGRESS NOTES
A&Ox4. VSS. RA. .  Telemetry: NSR  GI: Abdomen soft, rounded. Denies passing gas.  Denies nausea.  : Voids via commode.  Pain is 9/10 to LUQ abdomen. PRN pain meds given per MAR with relief.  Up with standby assist.  Incisions: 6 lap sites with skin glue, CDI.  Diet: Tolerating clears  IVF saline locked.  Plan of care discussed with patient and call light in reach.

## 2025-02-11 NOTE — PROGRESS NOTES
[facility]   part of [facility]     Hospitalist Progress Note     Jan Babb Patient Status:  Outpatient in a Bed    1955 MRN [id number]   [facility] 3NW-A Attending Halley Collier MD   Hosp Day # 0 PCP Obdulia Waldrop MD     Chief Complaint: med management    Subjective:     Patient without acute events overnight. No flatus or BM yet. No F/C. Tolerating liquids    Objective:    Review of Systems:   A comprehensive review of systems was completed; pertinent positive and negatives stated in subjective.    Vital signs:  Temp:  [97.3 °F (36.3 °C)-98.8 °F (37.1 °C)] 98.8 °F (37.1 °C)  Pulse:  [56-81] 62  Resp:  [12-24] 18  BP: (131-171)/() 162/76  SpO2:  [94 %-100 %] 96 %    Physical Exam:    General: No acute distress  Respiratory: No wheezes, no rhonchi  Cardiovascular: S1, S2, regular rate and rhythm  Abdomen: Soft, incisional pain, abd binder in place   Neuro: No new focal deficits.   Extremities: No edema      Diagnostic Data:    Labs:  Recent Labs   Lab 25  0603   WBC 11.2*   HGB 12.9   MCV 87.2   .0       Recent Labs   Lab 25  0630   *   BUN 8*   CREATSERUM 0.89   CA 9.5      K 4.5      CO2 22.0       Estimated Glomerular Filtration Rate: 69.8 mL/min/1.73m2 (by CKD-EPI based on SCr of 0.89 mg/dL).    No results for input(s): \"TROP\", \"TROPHS\", \"CK\" in the last 168 hours.    No results for input(s): \"PTP\", \"INR\" in the last 168 hours.               Microbiology    No results found for this visit on 02/10/25.      Imaging: Reviewed in Epic.    Medications:    lidocaine-menthol  1 patch Transdermal Daily    docusate sodium  100 mg Oral BID    enoxaparin  40 mg Subcutaneous Daily    acetaminophen  1,000 mg Oral Once    acetaminophen  1,000 mg Oral Q8H MARGARITA    traMADol  50 mg Oral 4 times per day    methocarbamol  500 mg Intravenous Q8H    ezetimibe  10 mg Oral Nightly    famotidine  20 mg Oral Daily    metoprolol succinate ER  25 mg  Oral Nightly       Assessment & Plan:      #s/p laparoscopic hiatal hernia repair with gastropexy  Pain control  Diet per surgery  Encourage ambulation as tolerated  Bowel regimen     #HTN, controlled     #DL, Zetia     #COPD, stable    #CAD,s table    #CAROLYN     #Obesity, BMI 30      Santino Amaro MD    Supplementary Documentation:     Quality:  DVT Mechanical Prophylaxis:        DVT Pharmacologic Prophylaxis   Medication    enoxaparin (Lovenox) 40 MG/0.4ML SUBQ injection 40 mg         DVT Pharmacologic prophylaxis: Aspirin 81 mg      Code Status: Not on file  [place]: No urinary catheter in place  [place] Duration (in days):   Central line:    JORDEN:     Discharge is dependent on: progress  At this point Ms. Babb is expected to be discharge to: home    The 21st Century Cures Act makes medical notes like these available to patients in the interest of transparency. Please be advised this is a medical document. Medical documents are intended to carry relevant information, facts as evident, and the clinical opinion of the practitioner. The medical note is intended as peer to peer communication and may appear blunt or direct. It is written in medical language and may contain abbreviations or verbiage that are unfamiliar.

## 2025-02-11 NOTE — PROGRESS NOTES
Alert & oriented x 4.With tolerable pain.No flatus yet but burping.Tolerated full liquid diet.Up in room and hallway  Use of I.S. encouraged.With lap. Sites x6 with skin glue c/d/I.Noted bruising and slight swelling  on lower abdomen lap.site-- aware.Abdominal binder in use.Plan of care discussed with patient.All questions and concerns addressed

## 2025-02-11 NOTE — ANESTHESIA POSTPROCEDURE EVALUATION
[facility]    Jan Babb Patient Status:  Outpatient in a Bed   Age/Gender 69 year old female MRN [id number]   Location [facility] POST ANESTHESIA CARE UNIT Attending Halley Collier MD   Hosp Day # 0 PCP Obdulia Waldrop MD       Anesthesia Post-op Note    LAPAROSCOPIC GASTROPEXY AND HIATAL HERNIA REPAIR    Procedure Summary       Date: 02/10/25 Room / Location:  MAIN OR 04 /  MAIN OR    Anesthesia Start: 1427 Anesthesia Stop: 1800    Procedure: LAPAROSCOPIC GASTROPEXY AND HIATAL HERNIA REPAIR (Abdomen) Diagnosis:       Hiatal hernia with GERD      (Hiatal hernia with GERD [K44.9, K21.9]Case ID: [id number] pls add cpt code 59751)    Surgeons: Halley Collier MD Anesthesiologist: Mesfin Langford MD    Anesthesia Type: general ASA Status: 3            Anesthesia Type: general    Vitals Value Taken Time   /87 02/10/25 1800   Temp 97.9 °F (36.6 °C) 02/10/25 1755   Pulse 76 02/10/25 1802   Resp 21 02/10/25 1802   SpO2 98 % 02/10/25 1802   Vitals shown include unfiled device data.        Patient Location: PACU    Anesthesia Type: general    Airway Patency: patent and extubated    Postop Pain Control: adequate    Mental Status: mildly sedated but able to meaningfully participate in the post-anesthesia evaluation    Nausea/Vomiting: none    Cardiopulmonary/Hydration status: stable euvolemic    Complications: no apparent anesthesia related complications    Postop vital signs: stable    Comments: Report given to RN    Dental Exam: Unchanged from Preop    Patient to be discharged from PACU when criteria met.

## 2025-02-11 NOTE — PROGRESS NOTES
NURSING ADMISSION NOTE      Patient admitted via  bed  Oriented to room 309.  Safety precautions initiated.  Bed in low position.  Call light in reach.    Admission navigator complete.  at bedside. Declining skin check at this time related to severe pain. Dilaudid given

## 2025-02-12 VITALS
BODY MASS INDEX: 30.82 KG/M2 | SYSTOLIC BLOOD PRESSURE: 147 MMHG | HEIGHT: 65 IN | HEART RATE: 70 BPM | WEIGHT: 185 LBS | DIASTOLIC BLOOD PRESSURE: 68 MMHG | TEMPERATURE: 99 F | RESPIRATION RATE: 18 BRPM | OXYGEN SATURATION: 92 %

## 2025-02-12 LAB
ANION GAP SERPL CALC-SCNC: 9 MMOL/L (ref 0–18)
BASOPHILS # BLD AUTO: 0.03 X10(3) UL (ref 0–0.2)
BASOPHILS NFR BLD AUTO: 0.3 %
BUN BLD-MCNC: 10 MG/DL (ref 9–23)
CALCIUM BLD-MCNC: 9.6 MG/DL (ref 8.7–10.6)
CHLORIDE SERPL-SCNC: 105 MMOL/L (ref 98–112)
CO2 SERPL-SCNC: 25 MMOL/L (ref 21–32)
CREAT BLD-MCNC: 0.96 MG/DL
EGFRCR SERPLBLD CKD-EPI 2021: 64 ML/MIN/1.73M2 (ref 60–?)
EOSINOPHIL # BLD AUTO: 0.09 X10(3) UL (ref 0–0.7)
EOSINOPHIL NFR BLD AUTO: 1 %
ERYTHROCYTE [DISTWIDTH] IN BLOOD BY AUTOMATED COUNT: 13.2 %
GLUCOSE BLD-MCNC: 137 MG/DL (ref 70–99)
HCT VFR BLD AUTO: 37.8 %
HGB BLD-MCNC: 12.5 G/DL
IMM GRANULOCYTES # BLD AUTO: 0.02 X10(3) UL (ref 0–1)
IMM GRANULOCYTES NFR BLD: 0.2 %
LYMPHOCYTES # BLD AUTO: 1.06 X10(3) UL (ref 1–4)
LYMPHOCYTES NFR BLD AUTO: 12.3 %
MCH RBC QN AUTO: 28.8 PG (ref 26–34)
MCHC RBC AUTO-ENTMCNC: 33.1 G/DL (ref 31–37)
MCV RBC AUTO: 87.1 FL
MONOCYTES # BLD AUTO: 0.65 X10(3) UL (ref 0.1–1)
MONOCYTES NFR BLD AUTO: 7.6 %
NEUTROPHILS # BLD AUTO: 6.74 X10 (3) UL (ref 1.5–7.7)
NEUTROPHILS # BLD AUTO: 6.74 X10(3) UL (ref 1.5–7.7)
NEUTROPHILS NFR BLD AUTO: 78.6 %
OSMOLALITY SERPL CALC.SUM OF ELEC: 289 MOSM/KG (ref 275–295)
PLATELET # BLD AUTO: 161 10(3)UL (ref 150–450)
POTASSIUM SERPL-SCNC: 4 MMOL/L (ref 3.5–5.1)
RBC # BLD AUTO: 4.34 X10(6)UL
SODIUM SERPL-SCNC: 139 MMOL/L (ref 136–145)
WBC # BLD AUTO: 8.6 X10(3) UL (ref 4–11)

## 2025-02-12 PROCEDURE — 99214 OFFICE O/P EST MOD 30 MIN: CPT | Performed by: INTERNAL MEDICINE

## 2025-02-12 RX ORDER — OXYCODONE HYDROCHLORIDE 5 MG/1
5 TABLET ORAL EVERY 6 HOURS PRN
Qty: 15 TABLET | Refills: 0 | Status: SHIPPED | OUTPATIENT
Start: 2025-02-12 | End: 2025-02-15

## 2025-02-12 RX ORDER — METHOCARBAMOL 500 MG/1
500 TABLET, FILM COATED ORAL 4 TIMES DAILY
Status: DISCONTINUED | OUTPATIENT
Start: 2025-02-12 | End: 2025-02-12

## 2025-02-12 RX ORDER — POLYETHYLENE GLYCOL 3350 17 G/17G
17 POWDER, FOR SOLUTION ORAL DAILY PRN
Qty: 30 EACH | Refills: 0 | Status: SHIPPED | OUTPATIENT
Start: 2025-02-12 | End: 2025-02-24

## 2025-02-12 RX ORDER — METHOCARBAMOL 500 MG/1
500 TABLET, FILM COATED ORAL 4 TIMES DAILY PRN
Qty: 120 TABLET | Refills: 0 | Status: SHIPPED | OUTPATIENT
Start: 2025-02-12 | End: 2025-03-14

## 2025-02-12 RX ORDER — ONDANSETRON 4 MG/1
4 TABLET, FILM COATED ORAL EVERY 8 HOURS PRN
Qty: 12 TABLET | Refills: 0 | Status: SHIPPED | OUTPATIENT
Start: 2025-02-12

## 2025-02-12 NOTE — PROGRESS NOTES
NURSING DISCHARGE NOTE    Discharged Home via Wheelchair.  Accompanied by RN  Belongings Taken by patient/family.    Meds to bed delivered new medications. Reviewed discharge instructions- okay to resume blood thinners tomorrow am. Abd in place. Pt felt pain was tolerable enough to go home.

## 2025-02-12 NOTE — PLAN OF CARE
Patient alert and oriented x4, vital signs stable on room air. Up with standby assist to void. Reports pain in left side over incision, denies nausea. Binder on at times. Tolerating diet, not passing gas, but burping. Saline locked. Pain controlled per mar with prn pain medication. Safety measures in place, questions addressed, plan of care discussed with patient.

## 2025-02-12 NOTE — PROGRESS NOTES
[facility]   part of [facility]     Hospitalist Progress Note     Jan Babb Patient Status:  Outpatient in a Bed    1955 MRN [id number]   [facility] 3NW-A Attending Halley Collier MD   Hosp Day # 0 PCP Obdulia Waldrop MD     Chief Complaint: med management    Subjective:     Patient without acute events overnight. Pain controlled. Tolerating diet. Hopes to go home.     Objective:    Review of Systems:   A comprehensive review of systems was completed; pertinent positive and negatives stated in subjective.    Vital signs:  Temp:  [98.2 °F (36.8 °C)-99.7 °F (37.6 °C)] 98.5 °F (36.9 °C)  Pulse:  [51-74] 70  Resp:  [16-20] 18  BP: (146-183)/(68-97) 147/68  SpO2:  [92 %-98 %] 92 %    Physical Exam:    General: No acute distress  Respiratory: No wheezes, no rhonchi  Cardiovascular: S1, S2, regular rate and rhythm  Abdomen: Soft, incisional pain, abd binder in place   Neuro: No new focal deficits.   Extremities: No edema      Diagnostic Data:    Labs:  Recent Labs   Lab 25  0603 25  0518   WBC 11.2* 8.6   HGB 12.9 12.5   MCV 87.2 87.1   .0 161.0       Recent Labs   Lab 25  0630 25  0518   * 137*   BUN 8* 10   CREATSERUM 0.89 0.96   CA 9.5 9.6    139   K 4.5 4.0    105   CO2 22.0 25.0       Estimated Glomerular Filtration Rate: 63.8 mL/min/1.73m2 (by CKD-EPI based on SCr of 0.96 mg/dL).    No results for input(s): \"TROP\", \"TROPHS\", \"CK\" in the last 168 hours.    No results for input(s): \"PTP\", \"INR\" in the last 168 hours.               Microbiology    No results found for this visit on 02/10/25.      Imaging: Reviewed in Epic.    Medications:    methocarbamol  500 mg Oral QID    lidocaine-menthol  1 patch Transdermal Daily    docusate sodium  100 mg Oral BID    enoxaparin  40 mg Subcutaneous Daily    acetaminophen  1,000 mg Oral Once    acetaminophen  1,000 mg Oral Q8H MARGARITA    ezetimibe  10 mg Oral Nightly    famotidine  20 mg Oral  Daily    metoprolol succinate ER  25 mg Oral Nightly       Assessment & Plan:      #s/p laparoscopic hiatal hernia repair with gastropexy  Pain control  Diet per surgery  Encourage ambulation as tolerated  Bowel regimen     #HTN, controlled     #DL, Zetia     #COPD, stable    #CAD,s table    #CAROLYN     #Obesity, BMI 30      Santino Amaro MD    Supplementary Documentation:     Quality:  DVT Mechanical Prophylaxis:        DVT Pharmacologic Prophylaxis   Medication    enoxaparin (Lovenox) 40 MG/0.4ML SUBQ injection 40 mg         DVT Pharmacologic prophylaxis: Aspirin 81 mg      Code Status: Not on file  [place]: No urinary catheter in place  B[place]os Duration (in days):   Central line:    JOREDN: 2/12/2025    Discharge is dependent on: progress  At this point Ms. Babb is expected to be discharge to: home    The 21st Century Cures Act makes medical notes like these available to patients in the interest of transparency. Please be advised this is a medical document. Medical documents are intended to carry relevant information, facts as evident, and the clinical opinion of the practitioner. The medical note is intended as peer to peer communication and may appear blunt or direct. It is written in medical language and may contain abbreviations or verbiage that are unfamiliar.

## 2025-02-12 NOTE — PROGRESS NOTES
Pt encouraged to consider staying until pain is better managed, as she reported 10/10 pain. Pt chose to continue with dc, as she feels her pain can be sufficiently managed at home. Meds to beds for Rx.

## 2025-02-12 NOTE — DISCHARGE SUMMARY
[facility]   part of [facility]    Discharge Summary    Jan Babb Patient Status:  Outpatient in a Bed    1955 MRN [id number]   Location [facility] 3-A Attending Halley Collier MD   Hosp Day # 0 PCP Obdulia Waldrop MD     Date of Admission: 2/10/2025 Disposition: Home or Self Care     Date of Discharge: 2025    Admitting Diagnosis: Hiatal hernia with GERD [K44.9, K21.9]  Case ID: [id number] pls add cpt code 22257    Discharge Diagnosis:   Patient Active Problem List   Diagnosis    Post-menopausal    Dyslipidemia    High triglycerides    High serum low-density lipoprotein (LDL)    Arthralgia of left acromioclavicular joint    Cervical disc disease    Heart murmur    Lumbar disc disease    Chronic back pain    COPD (chronic obstructive pulmonary disease) (HCC)    Generalized anxiety disorder with panic attacks    Inflammatory spondylopathy, unspecified spinal region    Neck mass    Deep vein thrombosis (DVT) of left lower extremity, unspecified chronicity, unspecified vein (HCC)    Anemia    Hyperglycemia    Acute deep vein thrombosis (DVT) of femoral vein of left lower extremity (HCC)    Tobacco use disorder    Hospital discharge follow-up    Current use of long term anticoagulation    CAROLYN on CPAP    Atherosclerosis of aorta    Coronary artery disease involving native coronary artery of native heart without angina pectoris    Ectatic abdominal aorta    Opioid type dependence, abuse (HCC)    Stage 3a chronic kidney disease (HCC)    Thrombophilia (HCC)    Pulmonary emphysema (HCC)    Fatty liver    Hiatal hernia    Non-recurrent bilateral inguinal hernia without obstruction or gangrene    Prediabetes    Pre-op testing    Benign essential HTN    Gastroesophageal reflux disease with esophagitis       Reason for Admission: Patient presented for elective laparoscopic gastropexy for refractory GERD    Physical Exam:   General: Alert, orientated x3.  Cooperative.  No apparent  distress.  HEENT: Exam is unremarkable.  Without scleral icterus.  Mucous membranes are moist. Oropharynx is clear.  Neck: No JVD. Supple.   Lungs: Non labored breathing, equal chest rise  Cardiac: Regular rate and rhythm. No murmur.  Abdomen:  Soft, non-distended, non-tender, with no rebound or guarding.  No peritoneal signs.  Surgical incisions healing well, no erythema or drainage, some ecchymosis around all of them  Extremities:  No lower extremity edema noted.  Without clubbing or cyanosis.  2+ pulses x4, motor and sensation grossly intact    History of Present Illness: This is a 69-year-old woman who was seen in the outpatient setting for refractory GERD.  She had a small hiatal hernia.  Gastropexy was indicated.    Hospital Course: Patient was admitted postoperatively.  See separate procedure note for details.  Hospitalist was consulted for medical management.  Patient initially had poor pain control but by hospital day 2, this was improved.  Patient was able to tolerated diet and have some bowel function.  She remained hemodynamically stable throughout the entire hospitalization.    Consultations: Hospitalist and physical therapy    Procedures: Laparoscopic hiatal hernia repair with gastropexy    Complications: None    Discharge Condition: Good         Discharge Medications:      Discharge Medications        START taking these medications        Instructions Prescription details   methocarbamol 500 MG Tabs  Commonly known as: Robaxin      Take 1 tablet (500 mg total) by mouth 4 (four) times daily as needed.   Stop taking on: March 14, 2025  Quantity: 120 tablet  Refills: 0     ondansetron 4 mg tablet  Commonly known as: Zofran      Take 1 tablet (4 mg total) by mouth every 8 (eight) hours as needed for Nausea.   Quantity: 12 tablet  Refills: 0     oxyCODONE 5 MG Tabs      Take 1 tablet (5 mg total) by mouth every 6 (six) hours as needed.   Quantity: 15 tablet  Refills: 0     Polyethylene Glycol 3350 17 g  Pack  Commonly known as: MIRALAX      Take 17 g by mouth daily as needed.   Quantity: 30 each  Refills: 0            CONTINUE taking these medications        Instructions Prescription details   aspirin 81 MG Tbec      Take 1 tablet (81 mg total) by mouth daily.   Refills: 0     clopidogrel 75 MG Tabs  Commonly known as: Plavix      Take 1 tablet (75 mg total) by mouth daily.   Refills: 0     ezetimibe 10 MG Tabs  Commonly known as: Zetia      Take 1 tablet (10 mg total) by mouth nightly.   Refills: 0     famotidine 20 MG Tabs  Commonly known as: Pepcid      Take 1 tablet (20 mg total) by mouth 2 (two) times daily as needed for Heartburn.   Quantity: 180 tablet  Refills: 0     Lasix 20 MG Tabs  Generic drug: furosemide      Take 0.5 tablets (10 mg total) by mouth daily as needed.   Refills: 0     metoprolol succinate ER 25 MG Tb24  Commonly known as: Toprol XL      Take 1 tablet (25 mg total) by mouth daily.   Refills: 0     Repatha SureClick 140 MG/ML Soaj  Generic drug: Evolocumab      140 mg every 14 (fourteen) days.   Refills: 0     Xarelto 10 MG Tabs  Generic drug: rivaroxaban      Take 1 tablet (10 mg total) by mouth daily with food.   Refills: 0            STOP taking these medications      tiZANidine HCl 2 MG Caps  Commonly known as: ZANAFLEX                  Where to Get Your Medications        These medications were sent to [address], [address] [phone], [phone]  [address] [place] [address], [facility] [address]      Phone: [phone]   methocarbamol 500 MG Tabs  ondansetron 4 mg tablet  oxyCODONE 5 MG Tabs  Polyethylene Glycol 3350 17 g Pack         Follow up Visits: Follow-up with [facility] general surgery in 2 weeks, [facility] in 1 month    Follow up Labs: None    Other Discharge Instructions: Restart Plavix and Eliquis on 2/13/2025    Halley Collier MD  2/12/2025  9:33 AM

## 2025-02-12 NOTE — DISCHARGE INSTRUCTIONS
Home Care Instructions  Dr. Halley Collier    MEDICATIONS  For post-operative pain control, the medications are usually oxycodone.  This is a narcotic and is best taken by starting with one tablet and repeating every six hours as needed.  If the patient does not feel they need the narcotics they shouldn’t take them.  If the pain is severe the patient may take up to two pills every six hours.  The patient can take tylenol 1g three times a day and ibuprofen 400-600mg in between up to 4 times a day as needed for pain as well.  The patient can take zofran 4mg every 6 hours as needed for nausea. The patient can also take miralax or colace as needed for constipation. Please ask your surgeon before resuming blood thinners such as aspirin, Plavix or Coumadin.  All other home medications may be resumed as scheduled.  With severe cholecystitis, antibiotics will also be prescribed.  With antibiotics, please watch for rash, facial swelling or severe diarrhea.    DIET  The patient may resume a general diet.  This is not a good time to eat excessively.  The patient should eat in moderation and stick with foods the patient feels are easy to digest.  Avoid high fat foods in the immediate post-operative time period as this may still cause some problems.  The patient may eat anything in small amounts but foods rich in dairy products, fatty foods or fried foods may cause an upset stomach for up to six weeks after surgery.  There should be no alcohol consumption in the immediate recovery time period or within six hours of taking narcotics.    WOUND CARE  The dressing is usually a dissolvable glue which protects the wound. The patient can take a shower starting the day after surgery, but please, no baths or soaking. Please do not put any creams or ointments on the surgical incisions.  When showering, soap can get on the wounds but do not scrub over the wounds.  No hair dye or chemicals of any kind should get in the wounds.  Avoid tub  baths, swimming or sitting in a hot tub for two weeks.  If visible sutures or staples are present they will be removed in the office by the surgeon or nurse.  Most wounds will be closed with dissolving suture underneath the skin.  These sutures will dissolve on their own.  If a drain is present make sure the patient receives drain care instructions from the nurse prior to discharge.  Most patients will not have a drain.    ACTIVITY  Every day the patient should be up, showered and dressed.  Each day the patient should be up and around the house.  The patient should not lie in bed and should not stay in pajamas.  We count on the patient being up, coughing, walking and deep breathing to avoid pneumonia and blood clots in the legs.  Once a day the patient should get out of the house and go shopping, go to the mall, the [employer] store, the [employer] or a restaurant.  The patient may ride in a car but should not drive the car for at least one week.  Patients should be off narcotics for at least 8 hours prior to being a .  The average time off work is 10 to 14 days; most adults will be seeing the surgeon prior to returning to work.  Students will return to school within 1-5 days after discharge from the hospital but will be off gym, sports, and indoors for recess for one month.  Patients may go up and down stairs and lift up to five pounds but no bending, pushing or pulling.  Nothing called work or exercise until the follow up visit.  No ‘stair-master’, power walking, jogging or workouts until the follow up visit.  Patients should seek further activity limits at the time of their appointment.    APPOINTMENT  Please call our office for an appointment within five to ten days of discharge.  Any fever greater than 100.5, chills, nausea, vomiting, or severe diarrhea please call our office.  If the wound turns red, hot, swollen, becomes increasingly painful, or drains pus call us immediately at [phone].  For life  threatening emergencies call 911.  For non-emergent care please call our office after 8:30 a.m. Monday through Friday.  The number listed above is our office number; our phone automatically switches to our answering service if we are not there.  Please do not use the emergency room for non-urgent care.    Thank you for entrusting us with your care.  [facility]--General Surgery

## 2025-02-12 NOTE — PROGRESS NOTES
[facility]  General Surgery Progress Note    Jan Babb Patient Status:  Outpatient in a Bed    1955 MRN [id number]   Location [facility] 3NW-A Attending Halley Collier MD   Hosp Day # 0 PCP Obdulia Waldrop MD     Subjective:  No acute events overnight.  Patient reports improvement of her abdominal pain.  She denies any nausea or vomiting.  She is tolerating soft diet.  She has had flatus but no bowel movements.    Objective/Physical Exam:      Intake/Output Summary (Last 24 hours) at 2025 0931  Last data filed at 2025 0804  Gross per 24 hour   Intake 1620 ml   Output 1225 ml   Net 395 ml       Vital Signs:  Blood pressure 147/68, pulse 70, temperature 98.5 °F (36.9 °C), temperature source Oral, resp. rate 18, height 65\", weight 185 lb (83.9 kg), SpO2 92%.    General: Alert, orientated x3.  Cooperative.  No apparent distress.  HEENT: Exam is unremarkable.  Without scleral icterus.  Mucous membranes are moist. Oropharynx is clear.  Neck: No JVD. Supple.   Lungs: Non labored breathing, equal chest rise  Cardiac: Regular rate and rhythm. No murmur.  Abdomen:  Soft, non-distended, non-tender, with no rebound or guarding.  No peritoneal signs.  Surgical incisions healing well, no erythema or drainage, some ecchymosis around all of them  Extremities:  No lower extremity edema noted.  Without clubbing or cyanosis.  2+ pulses x4, motor and sensation grossly intact      Labs:  Lab Results   Component Value Date    WBC 8.6 2025    RBC 4.34 2025    HGB 12.5 2025    HCT 37.8 2025    MCV 87.1 2025    MCH 28.8 2025    MCHC 33.1 2025    RDW 13.2 2025    .0 2025     Lab Results   Component Value Date     2025    K 4.0 2025     2025    CO2 25.0 2025    BUN 10 2025    CREATSERUM 0.96 2025     2025    CA 9.6 2025          Images:  No results  found.    Assessment/Plan:  Patient Active Problem List   Diagnosis    Post-menopausal    Dyslipidemia    High triglycerides    High serum low-density lipoprotein (LDL)    Arthralgia of left acromioclavicular joint    Cervical disc disease    Heart murmur    Lumbar disc disease    Chronic back pain    COPD (chronic obstructive pulmonary disease) (HCC)    Generalized anxiety disorder with panic attacks    Inflammatory spondylopathy, unspecified spinal region    Neck mass    Deep vein thrombosis (DVT) of left lower extremity, unspecified chronicity, unspecified vein (HCC)    Anemia    Hyperglycemia    Acute deep vein thrombosis (DVT) of femoral vein of left lower extremity (HCC)    Tobacco use disorder    Hospital discharge follow-up    Current use of long term anticoagulation    CAROLYN on CPAP    Atherosclerosis of aorta    Coronary artery disease involving native coronary artery of native heart without angina pectoris    Ectatic abdominal aorta    Opioid type dependence, abuse (HCC)    Stage 3a chronic kidney disease (HCC)    Thrombophilia (HCC)    Pulmonary emphysema (HCC)    Fatty liver    Hiatal hernia    Non-recurrent bilateral inguinal hernia without obstruction or gangrene    Prediabetes    Pre-op testing    Benign essential HTN    Gastroesophageal reflux disease with esophagitis       69 year old female with refractory GERD status post laparoscopic hiatal hernia repair and gastropexy    Overall, patient is doing well  She is tolerating diet and pain is better controlled today  Will discharge patient  She should continue pain control as needed and muscle relaxant  She should also continue bowel regimen during this period    If patient has any questions or concerns, she is contact the office  She can resume her Plavix and Eliquis starting tomorrow    Halley Collier MD  [facility]  2/12/2025  9:31 AM

## 2025-02-14 ENCOUNTER — TELEPHONE (OUTPATIENT)
Facility: LOCATION | Age: 70
End: 2025-02-14

## 2025-02-14 NOTE — TELEPHONE ENCOUNTER
Spoke with patient 1 mo PO appointment with Surgeon scheduled.  Patient verbalized understanding  Future Appointments   Date Time Provider Department Center   2/24/2025  2:00 PM [facility] GEN SURG [facility] [id number]   3/12/2025  2:00 PM Halley Collier MD [id number]   3/17/2025  2:00 PM Obdulia Waldrop MD [id number]

## 2025-02-14 NOTE — TELEPHONE ENCOUNTER
Patient was told to schedule a 1 month post op for Dr. Collier for 3/10.   Dr. Collier doesn't have anything available until 03/17.   Please advise

## 2025-02-15 DIAGNOSIS — G89.18 POST-OPERATIVE PAIN: ICD-10-CM

## 2025-02-17 DIAGNOSIS — G89.18 POST-OPERATIVE PAIN: ICD-10-CM

## 2025-02-17 RX ORDER — OXYCODONE HYDROCHLORIDE 5 MG/1
5 TABLET ORAL EVERY 8 HOURS PRN
Qty: 10 TABLET | Refills: 0 | Status: SHIPPED | OUTPATIENT
Start: 2025-02-17

## 2025-02-17 RX ORDER — OXYCODONE HYDROCHLORIDE 5 MG/1
5 TABLET ORAL EVERY 6 HOURS PRN
Qty: 10 TABLET | Refills: 0 | Status: SHIPPED | OUTPATIENT
Start: 2025-02-17 | End: 2025-02-17

## 2025-02-21 DIAGNOSIS — G89.18 POST-OPERATIVE PAIN: ICD-10-CM

## 2025-02-21 RX ORDER — OXYCODONE HYDROCHLORIDE 5 MG/1
5 TABLET ORAL EVERY 8 HOURS PRN
Qty: 10 TABLET | Refills: 0 | Status: SHIPPED | OUTPATIENT
Start: 2025-02-21 | End: 2025-02-24

## 2025-02-24 ENCOUNTER — OFFICE VISIT (OUTPATIENT)
Facility: LOCATION | Age: 70
End: 2025-02-24
Payer: MEDICARE

## 2025-02-24 VITALS
TEMPERATURE: 98 F | HEART RATE: 67 BPM | SYSTOLIC BLOOD PRESSURE: 114 MMHG | RESPIRATION RATE: 16 BRPM | DIASTOLIC BLOOD PRESSURE: 76 MMHG

## 2025-02-24 DIAGNOSIS — K44.9 HIATAL HERNIA: ICD-10-CM

## 2025-02-24 DIAGNOSIS — R19.7 DIARRHEA, UNSPECIFIED TYPE: ICD-10-CM

## 2025-02-24 DIAGNOSIS — Z98.890 POSTOPERATIVE STATE: Primary | ICD-10-CM

## 2025-02-24 NOTE — PROGRESS NOTES
Follow Up Visit Note       Active Problems      1. Postoperative state    2. Hiatal hernia    3. Diarrhea, unspecified type          Chief Complaint   Chief Complaint   Patient presents with    Post-Op     PO- 02/10/2025 LAPAROSCOPIC GASTROPEXY AND HIATAL HERNIA REPAIR w/ [facility].Pt reports some severe pain which can sometimes cause her to have shortness of breath. Pt reports some nausea and diarrhea. Pt states she feels like her appetite has not yet returned.         History of Present Illness  Jan is a 69 year old female who underwent laparoscopic gastropexy and hiatal hernia repair with Dr. Gallo on 2/10/2025 with Dr. Collier. She presents to clinic today for follow up evaluation.    She reports she is doing okay following surgery. She reports abdominal soreness, especially to gastropexy site.  She reports pain is worse with inspiration, causing her to catch her breath. She denies chest pain or swelling to her lower extremity. She is on xarelto and plavix. She denies nausea or vomiting, but does report poor appetite. She reports she is only able to tolerate small portion sizes. She reports diarrhea since surgery. She denies fever or chills.     Allergies  Jan is allergic to moxifloxacin hcl in nacl, statins, venlafaxine, ketorolac tromethamine, pregabalin, trazodone, amitriptyline, and risperidone.    Past Medical / Surgical / Social / Family History    The past medical and past surgical history have been reviewed by me today.    Past Medical History:    Allergic rhinitis    Anesthesia complication    DECREASED BREATHING/RESPIRATIONS AFTER PREVIOUS SURGERIES    Anxiety    Anxiety state    Back problem    Benign essential HTN    Cataract    COPD (chronic obstructive pulmonary disease) (HCC)    NO HOME O2    Coronary atherosclerosis    Deep vein thrombosis (HCC)    STENT LEFT LEG    Disorder of liver    FATTY LIVER    Essential hypertension    Always had it    Generalized anxiety disorder with panic attacks     Heart murmur    High blood pressure    High cholesterol    History of blood transfusion    AROUND AGE 19, NO ADVERSE REACTIONS    Hx of motion sickness    Hyperlipidemia    Since I was 17    Osteoarthritis    Other chest pain    Shortness of breath    Sleep apnea    NO LONGER USING CPAP    Thrombophilia (HCC)    Visual impairment    GLASSES     Past Surgical History:   Procedure Laterality Date    Angioplasty (coronary)      Have 3 stents    Appendectomy      Back surgery      Bypass surgery      Cabg  2022    Cataract  2021    right and left with toric     Cath bare metal stent (bms)  2023    X2    Cholecystectomy      Colonoscopy      Elective carotid intervention      Excis lumbar disk,one level      Glaucoma surgery      Hysterectomy      total hyseterectomy and oophorecomty at age 24.     Laparoscopic cholecystectomy            Oophorectomy      Other  2022    Lipoma    Other surgical history      back surgery l5s1 fusion in , then screw removed, which caussed spinal fluid leak which was fixed after three surgeries four year aso     Replacement prosthetic aortic valve w/ cardiopulmonary bypass; w/ stent< tissue valve      Spine surgery procedure unlisted      4 back surgeries    Tonsillectomy         The family history and social history have been reviewed by me today.    Family History   Problem Relation Age of Onset    Lipids Father     Lipids Mother     No Known Problems Daughter     No Known Problems Son     Heart Disease Brother      Social History     Socioeconomic History    Marital status:    Tobacco Use    Smoking status: Former     Current packs/day: 0.00     Average packs/day: 1 pack/day for 13.0 years (13.0 ttl pk-yrs)     Types: Cigarettes     Quit date:      Years since quittin.1    Smokeless tobacco: Never    Tobacco comments:     No longer smokes   Vaping Use    Vaping status: Never Used   Substance and Sexual Activity    Alcohol use: Never    Drug  use: Never   Other Topics Concern    Caffeine Concern No    Stress Concern No    Weight Concern Yes    Special Diet Yes    Exercise Yes    Seat Belt Yes        Current Outpatient Medications:     methocarbamol 500 MG Oral Tab, Take 1 tablet (500 mg total) by mouth 4 (four) times daily as needed., Disp: 120 tablet, Rfl: 0    ondansetron (ZOFRAN) 4 mg tablet, Take 1 tablet (4 mg total) by mouth every 8 (eight) hours as needed for Nausea., Disp: 12 tablet, Rfl: 0    famotidine 20 MG Oral Tab, Take 1 tablet (20 mg total) by mouth 2 (two) times daily as needed for Heartburn., Disp: 180 tablet, Rfl: 0    REPATHA SURECLICK 140 MG/ML Subcutaneous Solution Auto-injector, 140 mg every 14 (fourteen) days., Disp: , Rfl:     ezetimibe 10 MG Oral Tab, Take 1 tablet (10 mg total) by mouth nightly., Disp: , Rfl:     clopidogrel 75 MG Oral Tab, Take 1 tablet (75 mg total) by mouth daily., Disp: , Rfl:     rivaroxaban (XARELTO) 10 MG Oral Tab, Take 1 tablet (10 mg total) by mouth daily with food., Disp: , Rfl:     metoprolol succinate ER 25 MG Oral Tablet 24 Hr, Take 1 tablet (25 mg total) by mouth daily., Disp: , Rfl:     furosemide (LASIX) 20 MG Oral Tab, Take 0.5 tablets (10 mg total) by mouth daily as needed., Disp: , Rfl:     oxyCODONE 5 MG Oral Tab, Take 1 tablet (5 mg total) by mouth every 8 (eight) hours as needed. (Patient not taking: Reported on 2/24/2025), Disp: 10 tablet, Rfl: 0    polyethylene glycol, PEG 3350, 17 g Oral Powd Pack, Take 17 g by mouth daily as needed. (Patient not taking: Reported on 2/24/2025), Disp: 30 each, Rfl: 0    aspirin 81 MG Oral Tab EC, Take 1 tablet (81 mg total) by mouth daily. (Patient not taking: Reported on 2/24/2025), Disp: , Rfl:      Review of Systems  The Review of Systems has been reviewed by me during today.  Review of Systems   Constitutional:  Negative for appetite change, chills, fatigue and fever.   Respiratory:  Positive for shortness of breath.    Cardiovascular:  Negative for  chest pain and leg swelling.   Gastrointestinal:  Positive for abdominal pain and diarrhea. Negative for abdominal distention, constipation, nausea and vomiting.   Genitourinary:  Negative for difficulty urinating, dysuria and hematuria.   Musculoskeletal:  Positive for myalgias.   Skin:  Negative for rash and wound.        Physical Findings   /76   Pulse 67   Temp 98 °F (36.7 °C) (Temporal)   Resp 16   Physical Exam  Vitals reviewed.   Constitutional:       General: She is not in acute distress.     Appearance: Normal appearance. She is not ill-appearing.   HENT:      Head: Normocephalic and atraumatic.   Eyes:      General: No scleral icterus.     Conjunctiva/sclera: Conjunctivae normal.   Pulmonary:      Effort: Pulmonary effort is normal. No respiratory distress.   Abdominal:      General: There is no distension.      Palpations: Abdomen is soft.      Tenderness: There is abdominal tenderness. There is no guarding or rebound.      Comments: Abdomen soft, non-distended, tender to incision site. Laparoscopic incision  are clean, dry and healing appropriately. No surrounding erythema or drainage. No fluctuance to palpation. No signs of infection. Healing ecchymosis noted. There is a small hematoma noted to gastropexy site.      Skin:     General: Skin is warm and dry.      Coloration: Skin is not jaundiced.      Findings: Bruising present. No erythema or rash.   Neurological:      Mental Status: She is alert.   Psychiatric:         Mood and Affect: Mood normal.         Behavior: Behavior normal.          Assessment   1. Postoperative state    2. Hiatal hernia    3. Diarrhea, unspecified type        Plan   The patient is doing well postoperatively.  Continue Diet as tolerated. Advise patient to eat smaller meals more frequently and can add nutritional shakes   Continue tylenol and robaxin as needed for pain control. Can utilize warm or cold compresses for comfort.   Continue local wound care, soap and water  to the incisions.   Given diarrhea, will order C. Diff to rule out infection. After she provides stool sample, recommend adding metamucil to her diet.   Recommend Lifting restrictions of no greater than 15-20 lbs for 6 weeks postoperatively  Discussed with the patient that should she develop severe chest pain, shortness of breath, abdominal pain, nausea, vomiting, fever or chills she should contact the office or present to the emergency department for more immediate evaluation  The patient was evaluated with Dr. Collier who is in agreement with the above plan.   All the patient's questions and concerns were addressed. They voiced understanding and are in agreement with the plan      Orders Placed This Encounter   Procedures    C. diff toxigenic PCR (OPT)       Follow Up  Follow up with Dr. Collier on 3/12/2025, sooner if needed.     Leslie Chatman PA-C

## 2025-02-26 ENCOUNTER — LAB ENCOUNTER (OUTPATIENT)
Dept: LAB | Age: 70
End: 2025-02-26
Payer: MEDICARE

## 2025-02-26 DIAGNOSIS — R19.7 DIARRHEA, UNSPECIFIED TYPE: ICD-10-CM

## 2025-03-10 ENCOUNTER — LAB ENCOUNTER (OUTPATIENT)
Dept: LAB | Age: 70
End: 2025-03-10
Attending: STUDENT IN AN ORGANIZED HEALTH CARE EDUCATION/TRAINING PROGRAM
Payer: MEDICARE

## 2025-03-11 LAB — C DIFF TOX B STL QL: NEGATIVE

## 2025-03-12 ENCOUNTER — OFFICE VISIT (OUTPATIENT)
Facility: LOCATION | Age: 70
End: 2025-03-12
Payer: MEDICARE

## 2025-03-12 VITALS
TEMPERATURE: 99 F | SYSTOLIC BLOOD PRESSURE: 125 MMHG | OXYGEN SATURATION: 98 % | DIASTOLIC BLOOD PRESSURE: 74 MMHG | HEART RATE: 61 BPM

## 2025-03-12 DIAGNOSIS — Z98.890 POST-OPERATIVE STATE: Primary | ICD-10-CM

## 2025-03-12 PROCEDURE — 1159F MED LIST DOCD IN RCRD: CPT | Performed by: STUDENT IN AN ORGANIZED HEALTH CARE EDUCATION/TRAINING PROGRAM

## 2025-03-12 PROCEDURE — 99024 POSTOP FOLLOW-UP VISIT: CPT | Performed by: STUDENT IN AN ORGANIZED HEALTH CARE EDUCATION/TRAINING PROGRAM

## 2025-03-12 PROCEDURE — 1160F RVW MEDS BY RX/DR IN RCRD: CPT | Performed by: STUDENT IN AN ORGANIZED HEALTH CARE EDUCATION/TRAINING PROGRAM

## 2025-03-12 NOTE — PROGRESS NOTES
Patient ID: Jan Babb is a 69 year old female.    Chief Complaint   Patient presents with    Post-Op     PO-1 MO, LAPAROSCOPIC GASTROPEXY AND HIATAL HERNIA REPAIR 2/10/25 [facility]. Feeling bad. Abdominal pain. Twisting pain. Takes her breath away.          HPI: Jan Babb is a 69 year old female presents for follow-up.  Since last appointment, patient reports continued pain at her gastropexy site.  She also reports episode of abdominal bloating with some nausea but no vomiting.  She has been tolerating diet and feels hungry for more things.  No fevers or chills    Workup: None      Past Medical History  Past Medical History:    Allergic rhinitis    Anesthesia complication    DECREASED BREATHING/RESPIRATIONS AFTER PREVIOUS SURGERIES    Anxiety    Anxiety state    Back problem    Benign essential HTN    Cataract    COPD (chronic obstructive pulmonary disease) (HCC)    NO HOME O2    Coronary atherosclerosis    Deep vein thrombosis (HCC)    STENT LEFT LEG    Disorder of liver    FATTY LIVER    Essential hypertension    Always had it    Generalized anxiety disorder with panic attacks    Heart murmur    High blood pressure    High cholesterol    History of blood transfusion    AROUND AGE 19, NO ADVERSE REACTIONS    Hx of motion sickness    Hyperlipidemia    Since I was 17    Osteoarthritis    Other chest pain    Shortness of breath    Sleep apnea    NO LONGER USING CPAP    Thrombophilia (HCC)    Visual impairment    GLASSES       Past Surgical History  Past Surgical History:   Procedure Laterality Date    Angioplasty (coronary)  2022    Have 3 stents    Appendectomy      Back surgery      Bypass surgery      Cabg  07/2022    Cataract  08/25/2021    right and left with toric     Cath bare metal stent (bms)  2023    X2    Cholecystectomy      Colonoscopy      Elective carotid intervention      Excis lumbar disk,one level      Glaucoma surgery  2021    Hysterectomy      total hyseterectomy and oophorecomty at age 24.      Laparoscopic cholecystectomy            Oophorectomy      Other  2022    Lipoma    Other surgical history      back surgery l5s1 fusion in , then screw removed, which caussed spinal fluid leak which was fixed after three surgeries four year aso     Replacement prosthetic aortic valve w/ cardiopulmonary bypass; w/ stent< tissue valve      Spine surgery procedure unlisted      4 back surgeries    Tonsillectomy         Medications  Current Outpatient Medications   Medication Sig Dispense Refill    methocarbamol 500 MG Oral Tab Take 1 tablet (500 mg total) by mouth 4 (four) times daily as needed. 120 tablet 0    ondansetron (ZOFRAN) 4 mg tablet Take 1 tablet (4 mg total) by mouth every 8 (eight) hours as needed for Nausea. 12 tablet 0    famotidine 20 MG Oral Tab Take 1 tablet (20 mg total) by mouth 2 (two) times daily as needed for Heartburn. 180 tablet 0    REPATHA SURECLICK 140 MG/ML Subcutaneous Solution Auto-injector 140 mg every 14 (fourteen) days.      ezetimibe 10 MG Oral Tab Take 1 tablet (10 mg total) by mouth nightly.      clopidogrel 75 MG Oral Tab Take 1 tablet (75 mg total) by mouth daily.      rivaroxaban (XARELTO) 10 MG Oral Tab Take 1 tablet (10 mg total) by mouth daily with food.      metoprolol succinate ER 25 MG Oral Tablet 24 Hr Take 1 tablet (25 mg total) by mouth daily.      furosemide (LASIX) 20 MG Oral Tab Take 0.5 tablets (10 mg total) by mouth daily as needed.         Allergies  Allergies[1]    Social History  History   Smoking Status    Former    Types: Cigarettes   Smokeless Tobacco    Never     History   Alcohol Use Never     History   Drug Use Unknown       Family History  Family History   Problem Relation Age of Onset    Lipids Father     Lipids Mother     No Known Problems Daughter     No Known Problems Son     Heart Disease Brother     Heart Disease Brother         Heart       Review of Systems  Review of Systems   Constitutional: Negative.    Respiratory: Negative.      Cardiovascular: Negative.    Gastrointestinal:  Positive for abdominal distention, abdominal pain and nausea. Negative for constipation and vomiting.       Exam  Vitals:    03/12/25 1404   BP: 125/74   Pulse: 61   Temp: 98.7 °F (37.1 °C)     Physical Exam  Constitutional:       Appearance: Normal appearance.   Cardiovascular:      Rate and Rhythm: Normal rate.   Pulmonary:      Effort: Pulmonary effort is normal.   Abdominal:      General: Abdomen is flat. There is no distension.      Palpations: Abdomen is soft.      Tenderness: There is abdominal tenderness.       Skin:     General: Skin is warm and dry.   Neurological:      Mental Status: She is alert and oriented to person, place, and time.           Assessment/Plan  Assessment   Problem List Items Addressed This Visit    None  Visit Diagnoses       Post-operative state    -  Primary            Jan Babb is a 69 year old female status post laparoscopic gastropexy and hiatal hernia repair    Overall, patient is doing well  She is still experiencing some abdominal bloating and nausea but denies any vomiting  She still has persistent pain at the gastropexy site  I recommend patient begin to take her Pepcid just twice daily to see if this helps with her abdominal bloating and nausea  She can continue to take the Robaxin to help with the pain at her gastropexy site  I will have patient follow-up in 1 month  She is to call the office for any questions or concerns      Halley Collier MD  General Surgery  [facility]     CC:  Obdulia Waldrop MD         [1]   Allergies  Allergen Reactions    Moxifloxacin Hcl In Nacl SHORTNESS OF BREATH    Statins OTHER (SEE COMMENTS)     Bone pain     Venlafaxine OTHER (SEE COMMENTS)     Unknown     Ketorolac Tromethamine ANXIETY    Pregabalin OTHER (SEE COMMENTS)    Trazodone OTHER (SEE COMMENTS)     \"HYPER\"    Amitriptyline FATIGUE    Risperidone NAUSEA ONLY

## 2025-03-31 ENCOUNTER — LAB ENCOUNTER (OUTPATIENT)
Dept: LAB | Age: 70
End: 2025-03-31
Attending: INTERNAL MEDICINE
Payer: MEDICARE

## 2025-03-31 DIAGNOSIS — E78.5 HYPERLIPEMIA: Primary | ICD-10-CM

## 2025-03-31 LAB
CHOLEST SERPL-MCNC: 120 MG/DL (ref ?–200)
FASTING PATIENT LIPID ANSWER: YES
HDLC SERPL-MCNC: 46 MG/DL (ref 40–59)
LDLC SERPL CALC-MCNC: 48 MG/DL (ref ?–100)
NONHDLC SERPL-MCNC: 74 MG/DL (ref ?–130)
TRIGL SERPL-MCNC: 153 MG/DL (ref 30–149)
VLDLC SERPL CALC-MCNC: 22 MG/DL (ref 0–30)

## 2025-03-31 PROCEDURE — 83695 ASSAY OF LIPOPROTEIN(A): CPT

## 2025-03-31 PROCEDURE — 82172 ASSAY OF APOLIPOPROTEIN: CPT

## 2025-03-31 PROCEDURE — 80061 LIPID PANEL: CPT

## 2025-03-31 PROCEDURE — 36415 COLL VENOUS BLD VENIPUNCTURE: CPT

## 2025-04-01 LAB — LIPOPROTEIN (A): 23.9 NMOL/L

## 2025-04-02 LAB — APOLIPOPROTEIN B: 57 MG/DL

## 2025-04-03 ENCOUNTER — LAB ENCOUNTER (OUTPATIENT)
Dept: LAB | Age: 70
End: 2025-04-03
Attending: FAMILY MEDICINE
Payer: MEDICARE

## 2025-04-03 ENCOUNTER — OFFICE VISIT (OUTPATIENT)
Dept: FAMILY MEDICINE CLINIC | Facility: CLINIC | Age: 70
End: 2025-04-03
Payer: MEDICARE

## 2025-04-03 VITALS
HEIGHT: 65 IN | SYSTOLIC BLOOD PRESSURE: 114 MMHG | RESPIRATION RATE: 18 BRPM | TEMPERATURE: 98 F | BODY MASS INDEX: 29.32 KG/M2 | HEART RATE: 65 BPM | WEIGHT: 176 LBS | DIASTOLIC BLOOD PRESSURE: 74 MMHG

## 2025-04-03 DIAGNOSIS — I25.810 CORONARY ARTERY DISEASE INVOLVING CORONARY BYPASS GRAFT OF NATIVE HEART WITHOUT ANGINA PECTORIS: ICD-10-CM

## 2025-04-03 DIAGNOSIS — R73.03 PREDIABETES: Primary | ICD-10-CM

## 2025-04-03 DIAGNOSIS — R73.03 PREDIABETES: ICD-10-CM

## 2025-04-03 LAB
EST. AVERAGE GLUCOSE BLD GHB EST-MCNC: 120 MG/DL (ref 68–126)
HBA1C MFR BLD: 5.8 % (ref ?–5.7)

## 2025-04-03 PROCEDURE — 99214 OFFICE O/P EST MOD 30 MIN: CPT | Performed by: FAMILY MEDICINE

## 2025-04-03 PROCEDURE — 1160F RVW MEDS BY RX/DR IN RCRD: CPT | Performed by: FAMILY MEDICINE

## 2025-04-03 PROCEDURE — 36415 COLL VENOUS BLD VENIPUNCTURE: CPT

## 2025-04-03 PROCEDURE — 1159F MED LIST DOCD IN RCRD: CPT | Performed by: FAMILY MEDICINE

## 2025-04-03 PROCEDURE — 83036 HEMOGLOBIN GLYCOSYLATED A1C: CPT

## 2025-04-03 RX ORDER — TIZANIDINE HYDROCHLORIDE 2 MG/1
2 CAPSULE, GELATIN COATED ORAL
COMMUNITY
Start: 2025-03-12

## 2025-04-04 NOTE — PROGRESS NOTES
[facility] Family Medicine Office Note  Chief Complaint:   Chief Complaint   Patient presents with    Lab Results     Labs completed on 2025       HPI:   This is a 69 year old female coming in for follow-up for blood work.  The patient states that she has been trying to lose weight but her blood sugar has been elevated.  She does have a history of coronary artery disease with bypass is completed in the past.  She states that she has been taking her medications as prescribed she has been following up with cardiology.      Past Medical History:    Allergic rhinitis    Anesthesia complication    DECREASED BREATHING/RESPIRATIONS AFTER PREVIOUS SURGERIES    Anxiety    Anxiety state    Back problem    Benign essential HTN    Cataract    COPD (chronic obstructive pulmonary disease) (HCC)    NO HOME O2    Coronary atherosclerosis    Deep vein thrombosis (HCC)    STENT LEFT LEG    Disorder of liver    FATTY LIVER    Essential hypertension    Always had it    Generalized anxiety disorder with panic attacks    Heart murmur    High blood pressure    High cholesterol    History of blood transfusion    AROUND AGE 19, NO ADVERSE REACTIONS    Hx of motion sickness    Hyperlipidemia    Since I was 17    Osteoarthritis    Other chest pain    Shortness of breath    Sleep apnea    NO LONGER USING CPAP    Thrombophilia (HCC)    Visual impairment    GLASSES     Past Surgical History:   Procedure Laterality Date    Angioplasty (coronary)      Have 3 stents    Appendectomy      Back surgery      Bypass surgery      Cabg  2022    Cataract  2021    right and left with toric     Cath bare metal stent (bms)  2023    X2    Cholecystectomy      Colonoscopy      Elective carotid intervention      Excis lumbar disk,one level      Glaucoma surgery      Hysterectomy      total hyseterectomy and oophorecomty at age 24.     Laparoscopic cholecystectomy            Oophorectomy      Other  2022    Lipoma    Other  surgical history      back surgery l5s1 fusion in , then screw removed, which caussed spinal fluid leak which was fixed after three surgeries four year aso     Replacement prosthetic aortic valve w/ cardiopulmonary bypass; w/ stent< tissue valve      Spine surgery procedure unlisted      4 back surgeries    Tonsillectomy       Social History:  Social History     Socioeconomic History    Marital status:    Tobacco Use    Smoking status: Former     Current packs/day: 0.00     Average packs/day: 1 pack/day for 13.0 years (13.0 ttl pk-yrs)     Types: Cigarettes     Quit date:      Years since quittin.2    Smokeless tobacco: Never    Tobacco comments:     No longer smokes   Vaping Use    Vaping status: Never Used   Substance and Sexual Activity    Alcohol use: Never    Drug use: Never   Other Topics Concern    Caffeine Concern No    Stress Concern No    Weight Concern Yes    Special Diet Yes    Exercise No    Seat Belt Yes     Social Drivers of Health     Food Insecurity: No Food Insecurity (2/10/2025)    NCSS - Food Insecurity     Worried About Running Out of Food in the Last Year: No     Ran Out of Food in the Last Year: No   Transportation Needs: No Transportation Needs (2/10/2025)    NCSS - Transportation     Lack of Transportation: No   Housing Stability: Not At Risk (2/10/2025)    NCSS - Housing/Utilities     Has Housing: Yes     Worried About Losing Housing: No     Unable to Get Utilities: No     Family History:  Family History   Problem Relation Age of Onset    Lipids Father     Lipids Mother     No Known Problems Daughter     No Known Problems Son     Heart Disease Brother     Heart Disease Brother         Heart     Allergies:  Allergies[1]  Current Meds:  Current Outpatient Medications   Medication Sig Dispense Refill    tiZANidine HCl 2 MG Oral Cap Take 1 capsule (2 mg total) by mouth.      semaglutide-weight management 0.25 MG/0.5ML Subcutaneous Solution Auto-injector Inject 0.5 mL  (0.25 mg total) into the skin once a week for 4 doses. 2 mL 0    ondansetron (ZOFRAN) 4 mg tablet Take 1 tablet (4 mg total) by mouth every 8 (eight) hours as needed for Nausea. 12 tablet 0    famotidine 20 MG Oral Tab Take 1 tablet (20 mg total) by mouth 2 (two) times daily as needed for Heartburn. 180 tablet 0    REPATHA SURECLICK 140 MG/ML Subcutaneous Solution Auto-injector 140 mg every 14 (fourteen) days.      ezetimibe 10 MG Oral Tab Take 1 tablet (10 mg total) by mouth nightly.      clopidogrel 75 MG Oral Tab Take 1 tablet (75 mg total) by mouth daily.      rivaroxaban (XARELTO) 10 MG Oral Tab Take 1 tablet (10 mg total) by mouth daily with food.      metoprolol succinate ER 25 MG Oral Tablet 24 Hr Take 1 tablet (25 mg total) by mouth daily.      furosemide (LASIX) 20 MG Oral Tab Take 0.5 tablets (10 mg total) by mouth daily as needed.        Counseling given: Not Answered  Tobacco comments: No longer smokes       REVIEW OF SYSTEMS:   Consitutional: No fevers, chills, sweats  Eye: No recent visual problems  ENMT: No ear pain nasal congestion sore throat  Respiratory: No shortness of breath, cough  Cardiovascular: No chest pain palpitations syncope  Gastrointestinal: No nausea vomiting diarrhea  Genitourinary: No hematuria  Hema/Lymph no bruising tendency, swollen lymph glands  Endocrine: Negative for excessive thirst excessive hunger      Medical, surgical, family, and social histories were reviewed      EXAM:   VITALS:   Vitals:    04/03/25 1154   BP: 114/74   Pulse: 65   Resp: 18   Temp: 97.5 °F (36.4 °C)      GENERAL: well developed, well nourished, in no apparent distress  SKIN: no rashes, no suspicious lesions: Cool and Dry  HEENT: atraumatic, normocephalic, ears and throat are clear    Ears: TM's clear and visible bilaterally, no excess cerumen or erythema.   EYES: Pupils equal round and reactive.  Extraocular motions intact no scleral icterus no injection or drainage  THROAT without erythema tonsillar  hypertrophy or exudate.  Uvula midline airway patent  NECK: Given midline.  No JVD or lymphadenopathy supple nontender no meningeal signs   LUNGS: clear to auscultation sounds equal bilaterally no wheezes rales or rhonchi  CARDIO: Regular rate and rhythm without murmurs gallops or rubs      ASSESSMENT AND PLAN:   1. Prediabetes  - Hemoglobin A1C; Future  I did discuss with the patient would like for her to complete a hemoglobin A1c.  We did also discuss that with her risk factors she would be a candidate to use Wegovy.  She was asked to increase her protein intake to least 120 g a day as well as to decrease her portion sizes.  I did discuss that if she does get the medication approved we will continue with 0.25 mg dose for once weekly and then increase thereafter.  2. Coronary artery disease involving coronary bypass graft of native heart without angina pectoris  - semaglutide-weight management 0.25 MG/0.5ML Subcutaneous Solution Auto-injector; Inject 0.5 mL (0.25 mg total) into the skin once a week for 4 doses.  Dispense: 2 mL; Refill: 0       Meds & Refills for this Visit:  Requested Prescriptions     Signed Prescriptions Disp Refills    semaglutide-weight management 0.25 MG/0.5ML Subcutaneous Solution Auto-injector 2 mL 0     Sig: Inject 0.5 mL (0.25 mg total) into the skin once a week for 4 doses.       Health Maintenance:  Health Maintenance Due   Topic Date Due    COVID-19 Vaccine (6 - 2024-25 season) 09/01/2024    Annual Well Visit  Never done    Mammogram  02/22/2025       Patient/Caregiver Education: Patient/Caregiver Education: There are no barriers to learning. Medical education done.   Outcome: Patient verbalizes understanding. Patient is notified to call with any questions, complications, allergies, or worsening or changing symptoms.  Patient is to call with any side effects or complications from the treatments as a result of today.     Problem List:  Patient Active Problem List   Diagnosis     Post-menopausal    Dyslipidemia    High triglycerides    High serum low-density lipoprotein (LDL)    Arthralgia of left acromioclavicular joint    Cervical disc disease    Heart murmur    Lumbar disc disease    Chronic back pain    COPD (chronic obstructive pulmonary disease) (HCC)    Generalized anxiety disorder with panic attacks    Inflammatory spondylopathy, unspecified spinal region    Neck mass    Deep vein thrombosis (DVT) of left lower extremity, unspecified chronicity, unspecified vein (HCC)    Anemia    Hyperglycemia    Acute deep vein thrombosis (DVT) of femoral vein of left lower extremity (HCC)    Tobacco use disorder    Hospital discharge follow-up    Current use of long term anticoagulation    CAROLYN on CPAP    Atherosclerosis of aorta    Coronary artery disease involving native coronary artery of native heart without angina pectoris    Ectatic abdominal aorta    Opioid type dependence, abuse (HCC)    Stage 3a chronic kidney disease (HCC)    Thrombophilia (HCC)    Pulmonary emphysema (HCC)    Fatty liver    Hiatal hernia    Non-recurrent bilateral inguinal hernia without obstruction or gangrene    Prediabetes    Pre-op testing    Benign essential HTN    Gastroesophageal reflux disease with esophagitis         No follow-ups on file.     Glenn Borjas MD    Please note that portions of this note may have been completed with a voice recognition program. Efforts were made to edit the dictations but occasionally words are mis-transcribed.       [1]   Allergies  Allergen Reactions    Moxifloxacin Hcl In Nacl SHORTNESS OF BREATH    Statins OTHER (SEE COMMENTS)     Bone pain     Venlafaxine OTHER (SEE COMMENTS)     Unknown     Ketorolac Tromethamine ANXIETY    Pregabalin OTHER (SEE COMMENTS)    Trazodone OTHER (SEE COMMENTS)     \"HYPER\"    Amitriptyline FATIGUE    Risperidone NAUSEA ONLY

## 2025-04-05 ENCOUNTER — PATIENT MESSAGE (OUTPATIENT)
Dept: FAMILY MEDICINE CLINIC | Facility: CLINIC | Age: 70
End: 2025-04-05

## 2025-04-05 DIAGNOSIS — Z12.31 VISIT FOR SCREENING MAMMOGRAM: ICD-10-CM

## 2025-04-09 ENCOUNTER — PATIENT MESSAGE (OUTPATIENT)
Dept: FAMILY MEDICINE CLINIC | Facility: CLINIC | Age: 70
End: 2025-04-09

## 2025-04-09 DIAGNOSIS — R73.03 PREDIABETES: Primary | ICD-10-CM

## 2025-04-09 RX ORDER — SEMAGLUTIDE 0.68 MG/ML
0.25 INJECTION, SOLUTION SUBCUTANEOUS WEEKLY
Qty: 3 ML | Refills: 0 | Status: SHIPPED | OUTPATIENT
Start: 2025-04-09

## 2025-04-09 NOTE — TELEPHONE ENCOUNTER
Abelardo from [facility] states that Wegovy was rejected and not covered under Part D Medicare. Per pharmacist, only covered if it's for diabetes or atherosclerotic disease.He tried to use ICD codes for CAD and atherosclerosis of aorta, still rejected. Pharmacy will re fax the paperwork and states that he will re initiate the process through CoverMyMeds as well. Abelardo also informed MA.      May call [phone], look for Fabrizio or Abelardo if additional information needed.

## 2025-04-15 ENCOUNTER — TELEPHONE (OUTPATIENT)
Dept: FAMILY MEDICINE CLINIC | Facility: CLINIC | Age: 70
End: 2025-04-15

## 2025-04-15 DIAGNOSIS — I25.10 CORONARY ARTERY DISEASE INVOLVING NATIVE CORONARY ARTERY OF NATIVE HEART WITHOUT ANGINA PECTORIS: Primary | ICD-10-CM

## 2025-04-15 DIAGNOSIS — R73.03 PREDIABETES: ICD-10-CM

## 2025-04-16 RX ORDER — TIRZEPATIDE 2.5 MG/.5ML
2.5 INJECTION, SOLUTION SUBCUTANEOUS WEEKLY
Qty: 2 ML | Refills: 0 | Status: SHIPPED | OUTPATIENT
Start: 2025-04-16

## 2025-04-19 DIAGNOSIS — K44.9 HIATAL HERNIA: ICD-10-CM

## 2025-04-21 RX ORDER — FAMOTIDINE 20 MG/1
20 TABLET, FILM COATED ORAL 2 TIMES DAILY PRN
Qty: 180 TABLET | Refills: 0 | Status: SHIPPED | OUTPATIENT
Start: 2025-04-21

## 2025-04-21 NOTE — TELEPHONE ENCOUNTER
Last office visit: 4/30/25  Next office visit: N/A  Last Refill:1/22/25    Requested Prescriptions     Pending Prescriptions Disp Refills    FAMOTIDINE 20 MG Oral Tab [Pharmacy Med Name: Famotidine 20 Mg Tab [facility]] 180 tablet 0     Sig: TAKE ONE TABLET BY MOUTH TWICE DAILY AS NEEDED FOR HEARTBURN         Please authorize if acceptable.   Thank you!

## 2025-04-22 ENCOUNTER — HOSPITAL ENCOUNTER (INPATIENT)
Facility: HOSPITAL | Age: 70
LOS: 2 days | Discharge: HOME | End: 2025-04-24
Attending: HOSPITALIST | Admitting: HOSPITALIST
Payer: MEDICARE

## 2025-04-22 PROBLEM — F11.20 OPIOID TYPE DEPENDENCE, ABUSE (HCC): Status: RESOLVED | Noted: 2023-04-13 | Resolved: 2025-04-22

## 2025-04-22 PROBLEM — R06.02 SHORTNESS OF BREATH: Status: ACTIVE | Noted: 2025-04-22

## 2025-04-22 LAB
ALBUMIN SERPL-MCNC: 4.4 G/DL (ref 3.2–4.8)
ALBUMIN/GLOB SERPL: 1.8 {RATIO} (ref 1–2)
ALP LIVER SERPL-CCNC: 84 U/L (ref 55–142)
ALT SERPL-CCNC: 12 U/L (ref 10–49)
ANION GAP SERPL CALC-SCNC: 13 MMOL/L (ref 0–18)
AST SERPL-CCNC: 19 U/L (ref ?–34)
BASOPHILS # BLD AUTO: 0.02 X10(3) UL (ref 0–0.2)
BASOPHILS NFR BLD AUTO: 0.3 %
BILIRUB SERPL-MCNC: 0.4 MG/DL (ref 0.2–1.1)
BUN BLD-MCNC: 6 MG/DL (ref 9–23)
CALCIUM BLD-MCNC: 9.7 MG/DL (ref 8.7–10.6)
CHLORIDE SERPL-SCNC: 110 MMOL/L (ref 98–112)
CO2 SERPL-SCNC: 20 MMOL/L (ref 21–32)
CREAT BLD-MCNC: 0.93 MG/DL (ref 0.55–1.02)
D DIMER PPP FEU-MCNC: 0.28 UG/ML FEU (ref ?–0.7)
EGFRCR SERPLBLD CKD-EPI 2021: 66 ML/MIN/1.73M2 (ref 60–?)
EOSINOPHIL # BLD AUTO: 0.12 X10(3) UL (ref 0–0.7)
EOSINOPHIL NFR BLD AUTO: 1.9 %
ERYTHROCYTE [DISTWIDTH] IN BLOOD BY AUTOMATED COUNT: 12.7 %
GLOBULIN PLAS-MCNC: 2.5 G/DL (ref 2–3.5)
GLUCOSE BLD-MCNC: 101 MG/DL (ref 70–99)
HCT VFR BLD AUTO: 36.4 % (ref 35–48)
HGB BLD-MCNC: 12.6 G/DL (ref 12–16)
IMM GRANULOCYTES # BLD AUTO: 0.02 X10(3) UL (ref 0–1)
IMM GRANULOCYTES NFR BLD: 0.3 %
LYMPHOCYTES # BLD AUTO: 1.69 X10(3) UL (ref 1–4)
LYMPHOCYTES NFR BLD AUTO: 26.2 %
MCH RBC QN AUTO: 29.1 PG (ref 26–34)
MCHC RBC AUTO-ENTMCNC: 34.6 G/DL (ref 31–37)
MCV RBC AUTO: 84.1 FL (ref 80–100)
MONOCYTES # BLD AUTO: 0.44 X10(3) UL (ref 0.1–1)
MONOCYTES NFR BLD AUTO: 6.8 %
NEUTROPHILS # BLD AUTO: 4.15 X10 (3) UL (ref 1.5–7.7)
NEUTROPHILS # BLD AUTO: 4.15 X10(3) UL (ref 1.5–7.7)
NEUTROPHILS NFR BLD AUTO: 64.5 %
NT-PROBNP SERPL-MCNC: 481 PG/ML (ref ?–125)
OSMOLALITY SERPL CALC.SUM OF ELEC: 294 MOSM/KG (ref 275–295)
PLATELET # BLD AUTO: 244 10(3)UL (ref 150–450)
POTASSIUM SERPL-SCNC: 3.9 MMOL/L (ref 3.5–5.1)
PROT SERPL-MCNC: 6.9 G/DL (ref 5.7–8.2)
RBC # BLD AUTO: 4.33 X10(6)UL (ref 3.8–5.3)
SODIUM SERPL-SCNC: 143 MMOL/L (ref 136–145)
TROPONIN I SERPL HS-MCNC: <3 NG/L (ref ?–34)
WBC # BLD AUTO: 6.4 X10(3) UL (ref 4–11)

## 2025-04-22 PROCEDURE — 99223 1ST HOSP IP/OBS HIGH 75: CPT | Performed by: HOSPITALIST

## 2025-04-22 RX ORDER — ONDANSETRON 2 MG/ML
4 INJECTION INTRAMUSCULAR; INTRAVENOUS EVERY 6 HOURS PRN
Status: DISCONTINUED | OUTPATIENT
Start: 2025-04-22 | End: 2025-04-24

## 2025-04-22 RX ORDER — IPRATROPIUM BROMIDE AND ALBUTEROL SULFATE 2.5; .5 MG/3ML; MG/3ML
3 SOLUTION RESPIRATORY (INHALATION) EVERY 4 HOURS PRN
Status: DISCONTINUED | OUTPATIENT
Start: 2025-04-22 | End: 2025-04-24

## 2025-04-22 RX ORDER — CLOPIDOGREL BISULFATE 75 MG/1
75 TABLET ORAL DAILY
Status: DISCONTINUED | OUTPATIENT
Start: 2025-04-23 | End: 2025-04-24

## 2025-04-22 RX ORDER — METHOCARBAMOL 500 MG/1
500 TABLET, FILM COATED ORAL 2 TIMES DAILY PRN
Status: DISCONTINUED | OUTPATIENT
Start: 2025-04-22 | End: 2025-04-24

## 2025-04-22 RX ORDER — METOCLOPRAMIDE HYDROCHLORIDE 5 MG/ML
10 INJECTION INTRAMUSCULAR; INTRAVENOUS EVERY 8 HOURS PRN
Status: DISCONTINUED | OUTPATIENT
Start: 2025-04-22 | End: 2025-04-24

## 2025-04-22 RX ORDER — POLYETHYLENE GLYCOL 3350 17 G/17G
17 POWDER, FOR SOLUTION ORAL DAILY PRN
Status: DISCONTINUED | OUTPATIENT
Start: 2025-04-22 | End: 2025-04-24

## 2025-04-22 RX ORDER — ECHINACEA PURPUREA EXTRACT 125 MG
1 TABLET ORAL
Status: DISCONTINUED | OUTPATIENT
Start: 2025-04-22 | End: 2025-04-24

## 2025-04-22 RX ORDER — EZETIMIBE 10 MG/1
10 TABLET ORAL NIGHTLY
Status: DISCONTINUED | OUTPATIENT
Start: 2025-04-22 | End: 2025-04-24

## 2025-04-22 RX ORDER — SODIUM PHOSPHATE, DIBASIC AND SODIUM PHOSPHATE, MONOBASIC 7; 19 G/230ML; G/230ML
1 ENEMA RECTAL ONCE AS NEEDED
Status: DISCONTINUED | OUTPATIENT
Start: 2025-04-22 | End: 2025-04-24

## 2025-04-22 RX ORDER — BISACODYL 10 MG
10 SUPPOSITORY, RECTAL RECTAL
Status: DISCONTINUED | OUTPATIENT
Start: 2025-04-22 | End: 2025-04-24

## 2025-04-22 RX ORDER — METOPROLOL SUCCINATE 25 MG/1
25 TABLET, EXTENDED RELEASE ORAL DAILY
Status: DISCONTINUED | OUTPATIENT
Start: 2025-04-23 | End: 2025-04-24

## 2025-04-22 RX ORDER — BENZONATATE 200 MG/1
200 CAPSULE ORAL 3 TIMES DAILY PRN
Status: DISCONTINUED | OUTPATIENT
Start: 2025-04-22 | End: 2025-04-24

## 2025-04-22 RX ORDER — FAMOTIDINE 20 MG/1
20 TABLET, FILM COATED ORAL 2 TIMES DAILY PRN
Status: DISCONTINUED | OUTPATIENT
Start: 2025-04-22 | End: 2025-04-23

## 2025-04-22 RX ORDER — ACETAMINOPHEN 500 MG
500 TABLET ORAL EVERY 4 HOURS PRN
Status: DISCONTINUED | OUTPATIENT
Start: 2025-04-22 | End: 2025-04-24

## 2025-04-22 RX ORDER — SENNOSIDES 8.6 MG
17.2 TABLET ORAL NIGHTLY PRN
Status: DISCONTINUED | OUTPATIENT
Start: 2025-04-22 | End: 2025-04-24

## 2025-04-22 NOTE — SPIRITUAL CARE NOTE
Spiritual Care Visit Note    Patient Name: Jan Babb Date of Spiritual Care Visit: 25   : 1955 Primary Dx: <principal problem not specified>       Referred By: Referral From: Nurse    Spiritual Care Taxonomy:    Intended Effects: Convey a calming presence    Methods: Offer support    Interventions: Acknowledge current situation, Active listening, Identify supportive relationship(s), Share words of hope and inspiration, Explain  role    Visit Type/Summary:     - Spiritual Care: Consulted with RN prior to visit. Offered empathic listening and emotional support. Provided support for Jan for spiritual/Tenriism requests. Opted to be by themselves at this time. Respected their space/time. Provided information regarding how to contact Spiritual Care and handed a Spiritual Care information card.  remains available for follow up.     Spiritual Care support can be requested via an Epic consult.   For urgent/immediate needs, please contact the On Call  at: [facility]: ext [phone]    Rev. Suresh Berger M.Div., M.T.S.,   Certified Grief Counseling Specialist  Advanced Practice Board Certified

## 2025-04-22 NOTE — CONSULTS
Facility Logo [facility]  [address] [place] [address], [address] [place] [address]  [phone]  Preliminary    Jan Babb  Progress Note  Demographics:  Name: Jan Babb YOB: 1955  Age: 69, Female Medical Record No: [id number]  Visited Date/Time: 04/22/2025 02:40 PM    Chief Complaints  follow up   c/o SOB since hernia repair in February   Cardiac risk factors Former smoker  Past Medical History  1.SOB (shortness of breath)  2.Gastroesophageal reflux disease with esophagitis, unspecified whether hemorrhage  3.Non-recurrent bilateral inguinal hernia without obstruction or gangrene  4.Benign essential HTN  5.Coronary artery disease involving native coronary artery of native heart without angina pectoris  6.Pre-op testing  7.Prediabetes  8.Ectatic abdominal aorta  9.Opioid type dependence, abuse  10.Stage 3a chronic kidney disease  11.Pulmonary emphysema  12.CAROLYN on CPAP  13.Atherosclerosis of aorta  14.Tobacco use disorder  15.Current use of long term anticoagulation  16.Deep vein thrombosis (DVT) of left lower extremity, unspecified chronicity, unspecified vein  17.Anemia  18.Hyperglycemia  19.Dyslipidemia  20.COPD (chronic obstructive pulmonary disease)  21.Generalized anxiety disorder with panic attacks  22.High triglycerides  23.High serum low-density lipoprotein (LDL)  24.Heart murmur  Past Surgical History  1.CABG, using 3 arterial grafts  Family History  1. Brother - FH: heart attack; Family history of early CAD -FHx (Reason for death)  Social History  Smoking status Former smoker  Tobacco usage - No (Ex-smoker (finding))  Review of systems  Cardiovascular COBURN  No history of Chest pain, Palpitations, Syncope, PND, Orthopnea, Edema and Claudication  Physical Examination  Vitals Right Arm Sitting  / 68 mmHg, Pulse rate 62 bpm, Height in 5' 5\", BMI: 29.1, Weight in 175 lbs (or) 79.38 kgs and BSA : 1.93 cc/m²  Cardiovascular   Allergies  1.Amitriptyline(Reaction:FATIGUE,  Severity:Mild)  2.Ketorolac Tromethamine(Reaction:ANXIETY, Severity:Mild)  3.Pregabalin(Reaction:OTHER (SEE COMMENTS), Severity:Mild)  4.Risperidone(Reaction:NAUSEA ONLY, Severity:Mild)  5.Statins(Reaction:OTHER (SEE COMMENTS), Severity:Mild)  6.Trazodone(Reaction:OTHER (SEE COMMENTS), Severity:Mild)  7.Venlafaxine(Reaction:OTHER (SEE COMMENTS), Severity:Mild)  Medications  1.buprenorphine-naloxone 2-0.5 MG Sublingual Film, DISSOLVE 1 FILM UNDER THE TONGUE ONCE DAILY  2.clopidogrel 75 MG Oral Tab, Take 1 tablet (75 mg total) by mouth daily.  3.ezetimibe 10 MG Oral Tab, Take 1 tablet (10 mg total) by mouth daily.  4.famotidine 40 MG Oral Tab, Take 1 tablet (40 mg total) by mouth 2 (two) times daily as needed for Heartburn.  5.furosemide (LASIX) 20 MG Oral Tab, Take 0.5 tablets (10 mg total) by mouth daily as needed.  6.metoprolol succinate ER 25 MG Oral Tablet 24 Hr, Take 1 tablet (25 mg total) by mouth daily.  7.REPATHA SURECLICK 140 MG/ML Subcutaneous Solution Auto-injector  8.rivaroxaban (XARELTO) 10 MG Oral Tab, Take 1 tablet (10 mg total) by mouth daily with food.  9.tiZANidine HCl 2 MG Oral Cap, Take 1 capsule (2 mg total) by mouth 2 (two) times daily as needed for Muscle spasms.  Impression  1.Benign essential HTN  2.Ectatic abdominal aorta  3.Stage 3a chronic kidney disease  4.Pulmonary emphysema  5.Atherosclerosis of aorta  6.Deep vein thrombosis (DVT) of left lower extremity, unspecified chronicity, unspecified vein  7.Heart murmur  Labs and Diagnostics ordered  1.EKG (electrocardiogram) (Today)  2.EKG (electrocardiogram) (Today)  Future appointments  1.Referral Visit - Obdulia Hahn ([email or web address]) : (Today)  Miscellaneous  1.Weight monitoring (regime/therapy)  Nurses documentation  Upcoming surgeries: no  Use of assistive devices(s): no  Refills: no   EKG: yes  Triage & medication list reviewed by:     pt's  agreeable to drive pt to hospital to be  admitted  Patient instructions  per verbal MD orders:   Admit to hospital   Go to the admissions desk in the ER- Pod D and tell them you are there for a direct admission   CPOE Orders carried out by: Jessie Deinse and Nikki VELASQUEZ  Care Providers: Ismael Piedra MD, Jessie Denise and Nikki VELASQUEZ  Disclaimer: Components of this note were documented using voice recognition system and are subject to errors not corrected at proofreading. Contact the author of this note for any clarifications.

## 2025-04-22 NOTE — H&P
[facility]     Jan Babb Patient Status:  Inpatient    1955 MRN [id number]   Location [facility] 8NE-A Attending Scooter Garcia MD   Hosp Day # 0 PCP Glenn Borjas MD     Chief Complaint: SOB    Subjective:    History of Present Illness:     Jan Babb is a 69 year old female with a past medical history of coronary artery disease status post CABG, COPD, hypertension who presented as a direct admission for shortness of breath.  Patient was seen in her cardiologist office earlier today.  She has been having worsening shortness of breath over the last several months.  Shortness of breath is worse with exertion.  She began having some left-sided chest pains as well today.  Denies any recent cough or fever, no nausea or vomiting, no diarrhea.  She has had symptoms similar to this before when she has had her bypass surgery.  She has no other acute complaints at this time.    History/Other:    Past Medical History:  Past Medical History[1]  Past Surgical History:   Past Surgical History[2]   Family History:   Family History[3]  Social History:    reports that she quit smoking about 11 years ago. Her smoking use included cigarettes. She has a 13 pack-year smoking history. She has never used smokeless tobacco. She reports that she does not drink alcohol and does not use drugs.     Allergies: Allergies[4]    Medications:  Medications Ordered Prior to Encounter[5]    Review of Systems:   A comprehensive review of systems was completed.    Pertinent positives and negatives noted in the HPI.    Objective:   Physical Exam:    /82 (BP Location: Right arm)   Pulse 64   Temp 97.9 °F (36.6 °C) (Oral)   Resp 20   Wt 175 lb 4.3 oz (79.5 kg)   SpO2 98%   BMI 29.17 kg/m²   General: No acute distress, Alert, pleasant    Respiratory: Diminished BS, no wheeze   Cardiovascular: S1, S2. Regular rate and rhythm  Abdomen: Soft, Non-tender, non-distended, positive bowel  sounds  Neuro: No new focal deficits  Extremities: No edema    Results:    Labs:      Labs Last 24 Hours:    Recent Labs   Lab 04/22/25  1848   RBC 4.33   HGB 12.6   HCT 36.4   MCV 84.1   MCH 29.1   MCHC 34.6   RDW 12.7   NEPRELIM 4.15   WBC 6.4   .0       Recent Labs   Lab 04/22/25  1848   *   BUN 6*   CREATSERUM 0.93   EGFRCR 66   CA 9.7   ALB 4.4      K 3.9      CO2 20.0*   ALKPHO 84   AST 19   ALT 12   BILT 0.4   TP 6.9       Estimated Glomerular Filtration Rate: 66 mL/min/1.73m2 (result from lab).    Lab Results   Component Value Date    INR 1.05 09/19/2024    INR 2.16 (H) 09/10/2024    INR 1.31 (H) 07/20/2022       Recent Labs   Lab 04/22/25  1848   TROPHS <3       Recent Labs   Lab 04/22/25  1848   PBNP 481*       No results for input(s): \"PCT\" in the last 168 hours.    Imaging: Imaging data reviewed in Epic.    Assessment & Plan:      #Shortness of breath  EKG NSR, nonacute  Trop < 3,   CTA pending  Cards consult - discussed plan    #CAD s/p CABG  Plavix, BB    #COPD  Duonebs    #HTN  Metopolol    #HLD  Zeita    #DVT on Xarelto        Plan of care discussed with patient, family, nurse, consultant     Scooter Garcia MD    Supplementary Documentation:     The 21st Century Cures Act makes medical notes like these available to patients in the interest of transparency. Please be advised this is a medical document. Medical documents are intended to carry relevant information, facts as evident, and the clinical opinion of the practitioner. The medical note is intended as peer to peer communication and may appear blunt or direct. It is written in medical language and may contain abbreviations or verbiage that are unfamiliar.                                       [1]   Past Medical History:   Allergic rhinitis    Anesthesia complication    DECREASED BREATHING/RESPIRATIONS AFTER PREVIOUS SURGERIES    Anxiety    Anxiety state    Back problem    Benign essential HTN    Cataract    COPD  (chronic obstructive pulmonary disease) (HCC)    NO HOME O2    Coronary atherosclerosis    Deep vein thrombosis (HCC)    STENT LEFT LEG    Disorder of liver    FATTY LIVER    Essential hypertension    Always had it    Generalized anxiety disorder with panic attacks    Heart murmur    High blood pressure    High cholesterol    History of blood transfusion    AROUND AGE 19, NO ADVERSE REACTIONS    Hx of motion sickness    Hyperlipidemia    Since I was 17    Osteoarthritis    Other chest pain    Shortness of breath    Sleep apnea    NO LONGER USING CPAP    Thrombophilia (HCC)    Visual impairment    GLASSES   [2]   Past Surgical History:  Procedure Laterality Date    Angioplasty (coronary)      Have 3 stents    Appendectomy      Back surgery      Bypass surgery      Cabg  2022    Cataract  2021    right and left with toric     Cath bare metal stent (bms)  2023    X2    Cholecystectomy      Colonoscopy      Elective carotid intervention      Excis lumbar disk,one level      Glaucoma surgery      Hysterectomy      total hyseterectomy and oophorecomty at age 24.     Laparoscopic cholecystectomy            Oophorectomy      Other  2022    Lipoma    Other surgical history      back surgery l5s1 fusion in , then screw removed, which caussed spinal fluid leak which was fixed after three surgeries four year aso     Replacement prosthetic aortic valve w/ cardiopulmonary bypass; w/ stent< tissue valve      Spine surgery procedure unlisted      4 back surgeries    Tonsillectomy     [3]   Family History  Problem Relation Age of Onset    Lipids Father     Lipids Mother     No Known Problems Daughter     No Known Problems Son     Heart Disease Brother     Heart Disease Brother         Heart   [4]   Allergies  Allergen Reactions    Moxifloxacin Hcl In Nacl SHORTNESS OF BREATH    Statins OTHER (SEE COMMENTS)     Bone pain     Venlafaxine OTHER (SEE COMMENTS)     Unknown     Ketorolac Tromethamine  ANXIETY    Pregabalin OTHER (SEE COMMENTS)    Trazodone OTHER (SEE COMMENTS)     \"HYPER\"    Amitriptyline FATIGUE    Risperidone NAUSEA ONLY   [5]   Current Facility-Administered Medications on File Prior to Encounter   Medication Dose Route Frequency Provider Last Rate Last Admin    [COMPLETED] ceFAZolin (Ancef) 2g in 10mL IV syringe premix  2 g Intravenous Once Halley Collier MD   2 g at 02/10/25 1440    [COMPLETED] labetalol (Trandate) 5 mg/mL injection             [COMPLETED] HYDROmorphone (Dilaudid) 1 MG/ML injection             [COMPLETED] fentaNYL (Sublimaze) 50 mcg/mL injection             [COMPLETED] HYDROmorphone (Dilaudid) 1 MG/ML injection             [COMPLETED] HYDROmorphone (Dilaudid) 1 MG/ML injection             [] methocarbamol (Robaxin) 1000 MG/10ML injection 500 mg  500 mg Intravenous Q8H Halley Collier MD   500 mg at 25 2203     Current Outpatient Medications on File Prior to Encounter   Medication Sig Dispense Refill    FAMOTIDINE 20 MG Oral Tab TAKE ONE TABLET BY MOUTH TWICE DAILY AS NEEDED FOR HEARTBURN 180 tablet 0    tiZANidine HCl 2 MG Oral Cap Take 1 capsule (2 mg total) by mouth 2 (two) times daily.      REPATHA SURECLICK 140 MG/ML Subcutaneous Solution Auto-injector 140 mg every 14 (fourteen) days.      ezetimibe 10 MG Oral Tab Take 1 tablet (10 mg total) by mouth nightly.      clopidogrel 75 MG Oral Tab Take 1 tablet (75 mg total) by mouth in the morning.      rivaroxaban (XARELTO) 10 MG Oral Tab Take 1 tablet (10 mg total) by mouth daily with food.      metoprolol succinate ER 25 MG Oral Tablet 24 Hr Take 1 tablet (25 mg total) by mouth in the morning.      furosemide (LASIX) 20 MG Oral Tab Take 0.5 tablets (10 mg total) by mouth daily as needed.      [] methocarbamol 500 MG Oral Tab Take 1 tablet (500 mg total) by mouth 4 (four) times daily as needed. 120 tablet 0

## 2025-04-22 NOTE — TRANSFER CENTER NOTE
DIRECT ADMISSION    Admitting MD: Dr. Garcia  Called in by: [facility]  Call back #: [phone]  Date of admission: 4/22/2025  Diagnosis: dyspnea  Specialist MD:  Dr. Piedra   Hospitalist assigned: Dr. Garcia   Type of admission: INPT  Type of bed: Cardiac Telemetry  Time of admission: 1600   Insurance Verification complete: Yes

## 2025-04-23 ENCOUNTER — APPOINTMENT (OUTPATIENT)
Dept: CT IMAGING | Facility: HOSPITAL | Age: 70
End: 2025-04-23
Attending: INTERNAL MEDICINE
Payer: MEDICARE

## 2025-04-23 ENCOUNTER — APPOINTMENT (OUTPATIENT)
Dept: CV DIAGNOSTICS | Facility: HOSPITAL | Age: 70
End: 2025-04-23
Attending: NURSE PRACTITIONER
Payer: MEDICARE

## 2025-04-23 PROBLEM — I25.810 CORONARY ARTERY DISEASE INVOLVING CORONARY BYPASS GRAFT: Status: ACTIVE | Noted: 2022-07-29

## 2025-04-23 LAB
ANION GAP SERPL CALC-SCNC: 10 MMOL/L (ref 0–18)
BASOPHILS # BLD AUTO: 0.03 X10(3) UL (ref 0–0.2)
BASOPHILS NFR BLD AUTO: 0.6 %
BUN BLD-MCNC: 7 MG/DL (ref 9–23)
CALCIUM BLD-MCNC: 9.7 MG/DL (ref 8.7–10.6)
CHLORIDE SERPL-SCNC: 110 MMOL/L (ref 98–112)
CO2 SERPL-SCNC: 24 MMOL/L (ref 21–32)
CREAT BLD-MCNC: 0.96 MG/DL (ref 0.55–1.02)
EGFRCR SERPLBLD CKD-EPI 2021: 64 ML/MIN/1.73M2 (ref 60–?)
EOSINOPHIL # BLD AUTO: 0.1 X10(3) UL (ref 0–0.7)
EOSINOPHIL NFR BLD AUTO: 2 %
ERYTHROCYTE [DISTWIDTH] IN BLOOD BY AUTOMATED COUNT: 12.9 %
GLUCOSE BLD-MCNC: 127 MG/DL (ref 70–99)
HCT VFR BLD AUTO: 35.7 % (ref 35–48)
HGB BLD-MCNC: 12.4 G/DL (ref 12–16)
IMM GRANULOCYTES # BLD AUTO: 0.01 X10(3) UL (ref 0–1)
IMM GRANULOCYTES NFR BLD: 0.2 %
LYMPHOCYTES # BLD AUTO: 1.45 X10(3) UL (ref 1–4)
LYMPHOCYTES NFR BLD AUTO: 28.8 %
MAGNESIUM SERPL-MCNC: 2.1 MG/DL (ref 1.6–2.6)
MCH RBC QN AUTO: 29.1 PG (ref 26–34)
MCHC RBC AUTO-ENTMCNC: 34.7 G/DL (ref 31–37)
MCV RBC AUTO: 83.8 FL (ref 80–100)
MONOCYTES # BLD AUTO: 0.36 X10(3) UL (ref 0.1–1)
MONOCYTES NFR BLD AUTO: 7.1 %
NEUTROPHILS # BLD AUTO: 3.09 X10 (3) UL (ref 1.5–7.7)
NEUTROPHILS # BLD AUTO: 3.09 X10(3) UL (ref 1.5–7.7)
NEUTROPHILS NFR BLD AUTO: 61.3 %
OSMOLALITY SERPL CALC.SUM OF ELEC: 298 MOSM/KG (ref 275–295)
PLATELET # BLD AUTO: 238 10(3)UL (ref 150–450)
POTASSIUM SERPL-SCNC: 3.8 MMOL/L (ref 3.5–5.1)
RBC # BLD AUTO: 4.26 X10(6)UL (ref 3.8–5.3)
SODIUM SERPL-SCNC: 144 MMOL/L (ref 136–145)
WBC # BLD AUTO: 5 X10(3) UL (ref 4–11)

## 2025-04-23 PROCEDURE — 93306 TTE W/DOPPLER COMPLETE: CPT | Performed by: NURSE PRACTITIONER

## 2025-04-23 PROCEDURE — 99232 SBSQ HOSP IP/OBS MODERATE 35: CPT | Performed by: INTERNAL MEDICINE

## 2025-04-23 PROCEDURE — 71275 CT ANGIOGRAPHY CHEST: CPT | Performed by: INTERNAL MEDICINE

## 2025-04-23 RX ORDER — FAMOTIDINE 20 MG/1
20 TABLET, FILM COATED ORAL
Status: DISCONTINUED | OUTPATIENT
Start: 2025-04-23 | End: 2025-04-24

## 2025-04-23 RX ORDER — SODIUM CHLORIDE 9 MG/ML
INJECTION, SOLUTION INTRAVENOUS
Status: COMPLETED | OUTPATIENT
Start: 2025-04-24 | End: 2025-04-24

## 2025-04-23 RX ORDER — FUROSEMIDE 10 MG/ML
40 INJECTION INTRAMUSCULAR; INTRAVENOUS ONCE
Status: COMPLETED | OUTPATIENT
Start: 2025-04-23 | End: 2025-04-23

## 2025-04-23 RX ORDER — TIZANIDINE 2 MG/1
2 TABLET ORAL 2 TIMES DAILY
Status: DISCONTINUED | OUTPATIENT
Start: 2025-04-23 | End: 2025-04-24

## 2025-04-23 NOTE — PROGRESS NOTES
Progress Note  Jan Babb Patient Status:  Inpatient    1955 MRN [id number]   Location [facility] 8NE-A Attending Vasu Costa, DO   Hosp Day # 1 PCP Glenn Borjas MD     Subjective:  Pt stated to be SOB all the time but worse when ambulating. Does have some pain to the left chest, worse with palpation.     Objective:  /74 (BP Location: Right arm)   Pulse 73   Temp 97.5 °F (36.4 °C) (Oral)   Resp 20   Wt 173 lb 15.1 oz (78.9 kg)   SpO2 100%   BMI 28.95 kg/m²     Telemetry: SB-NSR       Intake/Output:    Intake/Output Summary (Last 24 hours) at 2025 1344  Last data filed at 2025 1200  Gross per 24 hour   Intake 720 ml   Output 1 ml   Net 719 ml       Last 3 Weights   25 0516 173 lb 15.1 oz (78.9 kg)   25 1749 175 lb 4.3 oz (79.5 kg)   25 1154 176 lb (79.8 kg)   02/10/25 1202 185 lb (83.9 kg)   25 1337 182 lb (82.6 kg)       Labs:  Recent Labs   Lab 25  0527   * 127*   BUN 6* 7*   CREATSERUM 0.93 0.96   EGFRCR 66 64   CA 9.7 9.7    144   K 3.9 3.8    110   CO2 20.0* 24.0     Recent Labs   Lab 25  0527   RBC 4.33 4.26   HGB 12.6 12.4   HCT 36.4 35.7   MCV 84.1 83.8   MCH 29.1 29.1   MCHC 34.6 34.7   RDW 12.7 12.9   NEPRELIM 4.15 3.09   WBC 6.4 5.0   .0 238.0         Recent Labs   Lab 25   TROPHS <3     Lab Results   Component Value Date    INR 1.05 2024    INR 2.16 (H) 09/10/2024    INR 1.31 (H) 2022       Diagnostics:     Review of Systems   Respiratory:  Positive for shortness of breath. Negative for cough, hemoptysis, sputum production and wheezing.    Cardiovascular:  Positive for chest pain. Negative for palpitations, orthopnea, claudication, leg swelling and PND.         Physical Exam:    Physical Exam  Vitals reviewed.   Constitutional:       General: She is not in acute distress.     Appearance: She is not ill-appearing.   Cardiovascular:      Rate and  Rhythm: Normal rate and regular rhythm.      Heart sounds: Murmur heard.      No friction rub. No gallop.   Pulmonary:      Effort: Pulmonary effort is normal. No respiratory distress.      Breath sounds: Normal breath sounds. No stridor. No wheezing, rhonchi or rales.   Chest:      Chest wall: No tenderness.   Musculoskeletal:      Right lower leg: No edema.      Left lower leg: No edema.   Skin:     General: Skin is warm and dry.   Neurological:      Mental Status: She is alert.         Medications:  Scheduled Medications[1]  Medication Infusions[2]    Assessment/Plan:    Shortness of breath  - Chest Ct negative for PE, moderate   - pro    - on room air  - SOB all the time, but worse with laying flat, and ambulation    Atypical Chest pain  - trop x1 negative  - EKG with NSR   - pt stated to have pain worse with palpation and some position changes or some times with  laying down    CAD s/p CABG x2V (LIMA to LAD, SVG to OM, PDA too small) with pericardial cyst resection 7/2022  - s/p PCI prox LAD 8/2023  - on BB, repatha, ezetimibe, plavix    H/o DVTs/p thrombectomy 2/2022  - on xarelto     L common iliac vein stenosis- S/p PTA 2022    HLD  - statin intolerant   - on repatha and ezetimibe    H/o smoking     H//o COPD    CAROLYN  - f/u with pulmonary as OP  - on CPAP- issues with tolerating, pt stated she has not been using it, since she cannot tolerate it.   - HCO3 normal on BMP    H/o hiatal hernia repair 2/2025    Prediabetes   - A1c  5.8    Plan  - euvolemic on exam    -continue plavix, xarelto,    -Continue zetia and metoprolol  - furhter recommendation pending echo result     ELBERT Mason  [facility]  4/23/2025  1:44 PM    Patient seen and examined independently.  Note reviewed and labs reviewed.  Agree with above assessment and plan with the below assessment and plan adjustments:    Denies prior HF history. Conversational dyspnea present. Denies acute CP, negative PE.  Elevated proBNP, with risk factors for HFpEF.     Dyspnea   - suspect likely HFpEF related (elevated proBNP with dyspnea and risk factors for HFpEF)   - discussed plan for IV lasix, and RHC tomorrow for evaluation (hold AC)  - risks and benefit discussed with patient, she is agreeable to proceeding.     CAD s/p CABG   - per history, LIMA to Lad and SVG to OM  - continue medical therapy    History of prior DVT    - on xarelto, negative PE study per CTA.     HLD   - medical therapy.     CAROLYN   - not utilizing cpap      Ronan Fletcher MD   Advanced Heart Failure and Transplant Cardiology   [facility]     L3       [1]    tiZANidine  2 mg Oral BID    clopidogrel  75 mg Oral Daily    ezetimibe  10 mg Oral Nightly    metoprolol succinate ER  25 mg Oral Daily    rivaroxaban  10 mg Oral Daily with food   [2]

## 2025-04-23 NOTE — PLAN OF CARE
Assumed care of patient at 0700. Pt A/Ox 4. O2 sats maintained on room air, weaned from 2L. Pt reporting SOB at rest and with activity. NSR/SB on tele. Last BM today. Voiding without difficulty. Pt reports no pain. Pt up standby assist. Pt updated on plan of care. Care needs met. Bed in lowest position, Call light within reach. Declining bed alarm.     POC: echo, PT/OT, cardiology consult    Problem: Patient/Family Goals  Goal: Patient/Family Long Term Goal  Description: Patient's Long Term Goal: discharge  Interventions:- appropriate consults, echo   - See additional Care Plan goals for specific interventions  Outcome: Progressing  Goal: Patient/Family Short Term Goal  Description: Patient's Short Term Goal: feel better  Interventions: - appropriate consults, echo   - See additional Care Plan goals for specific interventions  Outcome: Progressing

## 2025-04-23 NOTE — PROGRESS NOTES
04/22/25 1749 04/22/25 1751 04/22/25 1753   Vital Signs   Pulse 63 67 74   Heart Rate Source Monitor Monitor Monitor   Resp 20 20 20   Respiratory Quality Normal Normal Normal   /66 145/84 138/88   MAP (mmHg) 89 100 (!) 104   BP Location Right arm Right arm Right arm   BP Method Automatic Automatic Automatic   Patient Position Lying Sitting Standing

## 2025-04-23 NOTE — PROGRESS NOTES
Problem: Shortness of breath     Data: Pt is A&Ox4. . Placed on 3L overnight for sleep. Hx CAROLYN no CPAP. On tele. On xarelto. Tolerating diet. Voids.     Intervention: CTA chest done results pending, ECHO ordered. Supplemental O2 for sleep and comfort.     Edu: Bed alarm on and call light within reach.

## 2025-04-23 NOTE — PROGRESS NOTES
[facility]   part of Merged with [facility]     Hospitalist Progress Note     Jan Babb Patient Status:  Inpatient    1955 MRN [id number]   Location [facility] 8NE-A Attending Vasu Costa, DO   Hosp Day # 1 PCP Glenn Borjas MD     Chief Complaint: SOB    Subjective:     Patient states left sided chest pain is intermittent. She has SOB even at rest. States it started after her hiatal hernia surgery in February. No LE edema. No fever or cough. No wheezing.    Objective:    Review of Systems:   A comprehensive review of systems was completed; pertinent positive and negatives stated in subjective.    Vital signs:  Temp:  [97.8 °F (36.6 °C)-98 °F (36.7 °C)] 97.8 °F (36.6 °C)  Pulse:  [63-78] 78  Resp:  [19-20] 19  BP: (138-153)/(66-88) 151/85  SpO2:  [94 %-100 %] 94 %    Physical Exam:    General: No acute distress, awake and alert  Respiratory: No wheezes, no rhonchi  Cardiovascular: S1, S2, regular rate and rhythm  Abdomen: Soft, Non-tender, non-distended, positive bowel sounds  Neuro: YOUNG x 4  Extremities: No edema    Diagnostic Data:    Labs:  Recent Labs   Lab 25  0527   WBC 6.4 5.0   HGB 12.6 12.4   MCV 84.1 83.8   .0 238.0     Recent Labs   Lab 25  0527   * 127*   BUN 6* 7*   CREATSERUM 0.93 0.96   CA 9.7 9.7   ALB 4.4  --     144   K 3.9 3.8    110   CO2 20.0* 24.0   ALKPHO 84  --    AST 19  --    ALT 12  --    BILT 0.4  --    TP 6.9  --      Estimated Glomerular Filtration Rate: 64 mL/min/1.73m2 (result from lab).    Recent Labs   Lab 25   TROPHS <3     No results for input(s): \"PTP\", \"INR\" in the last 168 hours.     Microbiology  No results found for this visit on 25.    Imaging: Reviewed in Epic.    Medications: Scheduled Medications[1]    Assessment & Plan:      #Shortness of breath  #Atypical chest pain  -EKG NSR, nonacute  -Trop < 3,   -CTA chest no PE or other acute abnormality   -Echo  pending  -Cardiology consult     #CAD s/p CABG  -Plavix, BB, Zetia     #COPD, stable  -Duonebs PRN     #HTN  -Metopolol     #HLD  -Zeita  -On Repatha outpatient     #DVT  -Xarelto    #Heartburn  -PRN pepcid      Vasu Costa DO    Supplementary Documentation:     Quality:  DVT Mechanical Prophylaxis:     Early ambuation  DVT Pharmacologic Prophylaxis   Medication    rivaroxaban (Xarelto) tab 10 mg   Code Status: Full  [place]: No urinary catheter in place  JORDEN: TBD    Discharge is dependent on: Clinical state, consultant recs  At this point Ms. Babb is expected to be discharge to: Home    The 21st Century Cures Act makes medical notes like these available to patients in the interest of transparency. Please be advised this is a medical document. Medical documents are intended to carry relevant information, facts as evident, and the clinical opinion of the practitioner. The medical note is intended as peer to peer communication and may appear blunt or direct. It is written in medical language and may contain abbreviations or verbiage that are unfamiliar.               [1]    clopidogrel  75 mg Oral Daily    ezetimibe  10 mg Oral Nightly    metoprolol succinate ER  25 mg Oral Daily    rivaroxaban  10 mg Oral Daily with food

## 2025-04-23 NOTE — PLAN OF CARE
Received patient at 1800 as a direct admit. Alert and oriented x4. Tele rhythm is SR/SB on the monitor, rates 50s-60s. O2 saturation is 97% on RM. Pt with dyspnea on exertion at rest. Placed nasal cannula at bedside as needed for comfort. Has chest soreness to a minimum. EKG was done- showed NSR, no abnormalities. Pt continent of bowel and bladder. Pt ambulates SBA with a walker for some generalized weakness. High fall risk precautions in place, bed alarm on.    POC:  CT chest  Echo  PT/OT eval    Pt oriented to room and unit. Reviewed plan of care and patient verbalized understanding.    Problem: Patient/Family Goals  Goal: Patient/Family Long Term Goal  Description: Patient's Long Term Goal: Breath better, feel better, stay out of hospital Interventions:- Rule out blood clot- See additional Care Plan goals for specific interventions  Outcome: Progressing  Goal: Patient/Family Short Term Goal  Description: Patient's Short Term Goal: No more SOB, feel better, go home Interventions: - Rule out blood clot, CT chest, have ECHO, PT/OT eval- See additional Care Plan goals for specific interventions  Outcome: Progressing

## 2025-04-23 NOTE — PHYSICAL THERAPY NOTE
PHYSICAL THERAPY EVALUATION - INPATIENT     Room Number: 8624/8624-A  Evaluation Date: 4/23/2025  Type of Evaluation: Initial  Physician Order: PT Eval and Treat    Presenting Problem: SOB, atypical chest pain  Co-Morbidities : s/p CABGx2 (2022), COPD, hypertension, DVT, CKD, CAROLYN  Reason for Therapy: Mobility Dysfunction and Discharge Planning    PHYSICAL THERAPY ASSESSMENT   Patient is a 69 year old female directly admitted on 4/22/2025 for SOB and atypical chest pain.  Prior to admission, patient's baseline is independent, yet admits to furniture walking.  Patient is currently functioning below baseline with transfers, gait, standing prolonged periods, and performing household tasks.  Patient is requiring supervision and contact guard assist as a result of the following impairments: decreased endurance/aerobic capacity, decreased muscular endurance, and medical status.      Patient will benefit from continued skilled PT Services for duration of hospitalization, however, given the patient is functioning near baseline level do not anticipate skilled physical therapy needs at discharge .  Anticipate pt would benefit from outpatient cardiac versus pulmonary rehab.    PLAN DURING HOSPITALIZATION  Nursing Mobility Recommendation : 1 Assist  PT Device Recommendation: Rolling walker  PT Treatment Plan: Endurance, Energy conservation, Patient education, Gait training, Strengthening, Stair training, Balance training  Rehab Potential : Good  Frequency (Obs): 3-5x/week     CURRENT GOALS    Goal #1 Patient is able to demonstrate supine - sit EOB @ level: modified independent  MET 4/23   Goal #2 Patient is able to demonstrate transfers Sit to/from Stand at assistance level: modified independent     Goal #3 Patient is able to ambulate 150 feet with assist device: walker - rolling (prn for energy conservation) at assistance level: supervision     Goal #4    Goal #5    Goal #6    Goal Comments: Goals established on  4/23/2025      PHYSICAL THERAPY MEDICAL/SOCIAL HISTORY  History related to current admission: Patient is a 69 year old female directly admitted on 4/22/2025 from cardiology office with shortness of breath and chest pain.    HOME SITUATION  Type of Home: House  Home Layout: One level  Stairs to Enter : 1   Railing: No    Stairs to Bedroom: 0         Lives With: Spouse    Drives: Yes   Patient Regularly Uses: Glasses (Pt owns a RW, but does not regularly use, typically furniture walker)      Prior Level of [place]: The pt reports that she is typically independent with ambulation and I/ADL's.  Pt denies any recent falls.    SUBJECTIVE  Pt c/o feeling cold and short of breath at rest.    OBJECTIVE     Fall Risk: Standard fall risk    WEIGHT BEARING RESTRICTION     PAIN ASSESSMENT  Rating: 3  Location: chest  Management Techniques: Activity promotion, Relaxation, Repositioning    COGNITION  Overall Cognitive Status:  WFL - within functional limits    RANGE OF MOTION AND STRENGTH ASSESSMENT  Upper extremity ROM and strength are within functional limits     Lower extremity ROM is within functional limits     Lower extremity strength is within functional limits     BALANCE  Static Sitting: Good  Dynamic Sitting: Good  Static Standing: Fair +  Dynamic Standing: Fair -      ACTIVITY TOLERANCE  Pulse: 60  Heart Rate Source: Monitor                   O2 WALK  Oxygen Therapy  SPO2% on Room Air at Rest: 97    MODIFIED NIELS DYSPNEA SCALE - (SHORTNESS OF BREATH SCALE)= 6 at rest, 9 with ambulation    SCORE DESCRIPTION   0 Nothing at all   1 Very slight   2 Slight   3 Moderate   4 Somewhat severe   5 Strong or hard breathing   6    7 Very hard breathing   8    9 Very, Very Severe   10 MAX - You need to stop     Patient Education provided:    Use this scale to rate the difficulty of your breathing:  - As you would rate your pain from 0-10, you can rate your SOB from 0-10  - Goal is to stay below 7/10 with activity  - If you  reach beyond 7/10, it is important to rest; stop activity and if you need to sit down to recover      AM-PAC '6-Clicks' INPATIENT SHORT FORM - BASIC MOBILITY  How much difficulty does the patient currently have...  Patient Difficulty: Turning over in bed (including adjusting bedclothes, sheets and blankets)?: A Little   Patient Difficulty: Sitting down on and standing up from a chair with arms (e.g., wheelchair, bedside commode, etc.): A Little   Patient Difficulty: Moving from lying on back to sitting on the side of the bed?: A Little   How much help from another person does the patient currently need...   Help from Another: Moving to and from a bed to a chair (including a wheelchair)?: A Little   Help from Another: Need to walk in hospital room?: A Little   Help from Another: Climbing 3-5 steps with a railing?: A Little     AM-PAC Score:  Raw Score: 18   Approx Degree of Impairment: 46.58%   Standardized Score (AM-PAC Scale): 43.63   CMS Modifier (G-Code): CK    FUNCTIONAL ABILITY STATUS  Gait Assessment   Functional Mobility/Gait Assessment  Gait Assistance: Contact guard assist  Distance (ft): 20  Assistive Device: Rolling walker  Pattern:  (decreased step length and clearance)    Skilled Therapy Provided     Bed Mobility:  Rolling: Mod I  Supine to sit: Mod I   Sit to supine: NT     Transfer Mobility:  Sit to stand: SBA   Stand to sit: SBA  Gait = CGA    Therapist's Comments: Pt able to complete sit>stand sans device.  When standing, pt appears guarded.  Pt offered a RW for ambulation and accepts. Pt educated on benefits of use of RW for energy conservation. Pt able to ambulate 10 feet, before feeling fatigued and turning around, requiring a start standing rest break and then ambulating 10 feet back to bedside chair.    Exercise/Education Provided:  Energy conservation  Functional activity tolerated  Gait training  Strengthening  Transfer training    Patient End of Session: Up in chair, Needs met, Call light  within reach, RN aware of session/findings, All patient questions and concerns addressed, Family present      Patient Evaluation Complexity Level:  History High - 3 or more personal factors and/or co-morbidities   Examination of body systems Moderate - addressing a total of 3 or more elements   Clinical Presentation  Moderate - Evolving   Clinical Decision Making Low Complexity       PT Session Time: 30 minutes  Gait Training: 10 minutes

## 2025-04-24 ENCOUNTER — APPOINTMENT (OUTPATIENT)
Dept: INTERVENTIONAL RADIOLOGY/VASCULAR | Facility: HOSPITAL | Age: 70
End: 2025-04-24
Attending: NURSE PRACTITIONER
Payer: MEDICARE

## 2025-04-24 VITALS
OXYGEN SATURATION: 99 % | DIASTOLIC BLOOD PRESSURE: 83 MMHG | RESPIRATION RATE: 16 BRPM | HEART RATE: 73 BPM | TEMPERATURE: 98 F | WEIGHT: 169.06 LBS | BODY MASS INDEX: 28 KG/M2 | SYSTOLIC BLOOD PRESSURE: 113 MMHG

## 2025-04-24 PROCEDURE — 4A023N6 MEASUREMENT OF CARDIAC SAMPLING AND PRESSURE, RIGHT HEART, PERCUTANEOUS APPROACH: ICD-10-PCS | Performed by: STUDENT IN AN ORGANIZED HEALTH CARE EDUCATION/TRAINING PROGRAM

## 2025-04-24 PROCEDURE — 99239 HOSP IP/OBS DSCHRG MGMT >30: CPT | Performed by: INTERNAL MEDICINE

## 2025-04-24 RX ORDER — HEPARIN SODIUM 5000 [USP'U]/ML
INJECTION, SOLUTION INTRAVENOUS; SUBCUTANEOUS
Status: COMPLETED
Start: 2025-04-24 | End: 2025-04-24

## 2025-04-24 RX ORDER — LIDOCAINE HYDROCHLORIDE 10 MG/ML
INJECTION, SOLUTION EPIDURAL; INFILTRATION; INTRACAUDAL; PERINEURAL
Status: COMPLETED
Start: 2025-04-24 | End: 2025-04-24

## 2025-04-24 RX ORDER — METHOCARBAMOL 500 MG/1
500 TABLET, FILM COATED ORAL 4 TIMES DAILY PRN
Status: SHIPPED | COMMUNITY
Start: 2025-04-24

## 2025-04-24 NOTE — PROCEDURES
Right Heart Catheterization    Procedures:    Right Heart Catheterization    Procedure details:    After consent obtained from patient, patient was brought to the cardiac catheterization lab. Patient was prepped and draped in usual sterile fashion. Lidocaine 1% was used to infiltrate site of access. Access was obtained with ultrasound guidance. Ultrasound guide and needle kit utilized to access site, and advanced sheath (7 fr) using modified seldinger technique.     RHC was performed using swan-daisy catheter and cardiac output using JUNI equation was also performed.  See below for data.       Indication:  dyspnea    Access Site: Right Internal Jugular Vein      Sheath Size (Fr): 7    Complications:  None apparent.    Findings:    Condition 1 (at rest)  BP: 122/68 mmHg  RA: 2 mmHg  RV: 19/2 mmHg  PA: 18/4 (mean 9) mmHg  PCWP: 5 mmHg  TP mmHg    Juni  CO: 3.4 liters/min  CI: 1.9 liters/min/m2  PVR: 2.3 Wood units  PVRI: 4.3 Wood units/m2    Condition 2 - fluid bolus challenge (500 ml fluid)  PA - 21/6 (mean 11)  PCWP - 6 (end exp)  CO - 3.8  CI - 2.1  PVR - 1.8  PVRi - 3.4    Condition 3 - passive leg raise   PA - 23/8 (mean 12)  PCWP - 5   CO - 3.8  CI- 2.1   PVR - 2.9  PVRi - 5.3    Of note, RA saturation was also done - 58.4%    Impression:  1- Normal filling pressures   2 - low normal cardiac indices (has preserved LV function)  3. NO evidence of pHTN   4 - no evidence of exercise / fluid challenge induces pHTN or HFpEF      Disposition: Discussed results with patient - provided verbal instructions on post sheath care.   - do not believe cardiac cause of dyspnea is present, consider alternative evaluation (defer to pulmonary / hospitalist)  - no need for diuretics.   - risk factor modifications.     Ronan Fletcher M.D.  Advanced Heart Failure and Transplant Cardiology

## 2025-04-24 NOTE — PROGRESS NOTES
NURSING DISCHARGE NOTE    Discharged Home via Wheelchair.  Accompanied by RN  Belongings Taken by patient/family.    Discharge instructions given to and understood by patient and . Appointments reviewed, all questions answered. Patient left unit with all belongings and in no signs or symptoms of distress. IV removed and catheter intact.

## 2025-04-24 NOTE — DISCHARGE INSTRUCTIONS
HOME CARE INSTRUCTIONS FOLLOWING CORONARY ANGIOGRAPHY     Activity  DO NOT drive after the procedure.  You may resume driving late the following day according to the nurse or physician's instructions  Plan on resting and relaxing tonight and tomorrow  Resume your normal activity after 48 hours, or as instructed by your physician  Do not lift anything over 10 pounds for the next 24 hours  Avoid drinking alcohol for the next 24 hours  If the groin site was used, avoid repeated stair use and excessive walking for the next 24 hours  If the wrist was used, avoid bending/flexing of the wrist for the next 24 hours     What is Normal?  A small lump at the procedure site associated with mild tenderness when touched  The procedure site may be bruised or discolored  There may be a small amount of drainage on the bandage     Special Instructions  Drink plenty of fluids during the next 24 hours to \"flush\" the contrast from your system  The bandage is to remain in place for 24 hours  Keep the bandage clean and dry  DO NOT submerge the procedure site for 72 hours (no bath tubs or pools)  This includes dishwashing/submersion of the wrist, if the wrist was used  After 24 hours, you must remove the bandage  You should shower after removing the bandage, and wash the procedure site gently with soap and water  If you choose to wear a bandage for a few days, make sure it remains clean and dry and that it is changed daily     When you should NOTIFY YOUR PHYSICIAN  Bleeding can occur at the procedure site - both on the outside of the skin and/or beneath the surface of the skin  Swelling or a large lump at the procedure site can occur, which may be accompanied by moderate to severe pain     If either of the above occurs, lie down flat.  Have someone apply pressure to the procedure site with both hands, as instructed by the nurse.  Hold pressure for 20 minutes and the bleeding should stop.  Notify your physician of the occurrence  If the  bleeding does not stop, call 911 and continue to apply pressure     If you experience signs of a fever, temperature > 101°, chills, infection (redness, swelling, thick yellow drainage, or a foul odor from the procedure site)  If you notice any numbness, tingling, or loss of feeling to your leg or foot or groin access  If you notice any numbness, tingling, or loss of feeling to your fingers or hand, if wrist access was utilized    Other  You may resume your present diet, unless otherwise specified by your physician.  You may resume all of your medications as prescribed, unless otherwise directed by your physician.  A list of your medications was provided to you at discharge.  Continue the walking program initiated in the hospital and progress your walking as directed.  Or, gradually resume your previous aerobic exercise schedule as tolerated.  Please call your physician’s office for a follow-up appointment.  You should be seen in 2 weeks.

## 2025-04-24 NOTE — PAYOR COMM NOTE
--------------  ADMISSION REVIEW   4/22-4/24  Payor: [employer]  Subscriber #:  [id number]  Authorization Number: [id number]    Admit date: 4/22/25  Admit time:  4:22 PM       REVIEW DOCUMENTATION:    Direct admit from MD office    4/22 H&P    Chief Complaint: FIDEL Babb is a 69 year old female with a past medical history of coronary artery disease status post CABG, COPD, hypertension who presented as a direct admission for shortness of breath.  Patient was seen in her cardiologist office earlier today.  She has been having worsening shortness of breath over the last several months.  Shortness of breath is worse with exertion.  She began having some left-sided chest pains as well today.  Denies any recent cough or fever, no nausea or vomiting, no diarrhea.  She has had symptoms similar to this before when she has had her bypass surgery.  She has no other acute complaints at this time.        /82 (BP Location: Right arm)   Pulse 64   Temp 97.9 °F (36.6 °C) (Oral)   Resp 20   Wt 175 lb 4.3 oz (79.5 kg)   SpO2 98%   BMI 29.17 kg/m²   General: No acute distress, Alert, pleasant    Respiratory: Diminished BS, no wheeze   Cardiovascular: S1, S2. Regular rate and rhythm  Abdomen: Soft, Non-tender, non-distended, positive bowel sounds  Neuro: No new focal deficits  Extremities: No edema       Lab 04/22/25  1848   RBC 4.33   HGB 12.6   HCT 36.4   MCV 84.1   MCH 29.1   MCHC 34.6   RDW 12.7   NEPRELIM 4.15   WBC 6.4   .0             Recent Labs   Lab 04/22/25  1848   *   BUN 6*   CREATSERUM 0.93   EGFRCR 66   CA 9.7   ALB 4.4      K 3.9      CO2 20.0*   ALKPHO 84   AST 19   ALT 12   BILT 0.4   TP 6.9     Assessment & Plan:  #Shortness of breath  EKG NSR, nonacute  Trop < 3,   CTA pending  Cards consult - discussed plan     #CAD s/p CABG  Plavix, BB     #COPD  Duonebs     #HTN  Metopolol     #HLD  Zeita     #DVT on Xarelto          4/23 IM  Patient states left  sided chest pain is intermittent. She has SOB even at rest. States it started after her hiatal hernia surgery in February.        Temp:  [97.8 °F (36.6 °C)-98 °F (36.7 °C)] 97.8 °F (36.6 °C)  Pulse:  [63-78] 78  Resp:  [19-20] 19  BP: (138-153)/(66-88) 151/85  SpO2:  [94 %-100 %] 94 %    Lab 04/22/25 1848 04/23/25  0527   WBC 6.4 5.0   HGB 12.6 12.4   MCV 84.1 83.8   .0 238.0           Recent Labs   Lab 04/22/25 1848 04/23/25  0527   * 127*   BUN 6* 7*   CREATSERUM 0.93 0.96   CA 9.7 9.7   ALB 4.4  --     144   K 3.9 3.8    110   CO2 20.0* 24.0   #Shortness of breath  #Atypical chest pain  -EKG NSR, nonacute  -Trop < 3,   -CTA chest no PE or other acute abnormality   -Echo pending  -Cardiology consult     #CAD s/p CABG  -Plavix, BB, Zetia     #COPD, stable  -Duonebs PRN     #HTN  -Metopolol     #HLD  -Zeita  -On Repatha outpatient     #DVT  -Xarelto     #Heartburn  -PRN pepcid        4/23 Cardiology    Pt stated to be SOB all the time but worse when ambulating. Does have some pain to the left chest, worse with palpation.      Objective:  /74 (BP Location: Right arm)   Pulse 73   Temp 97.5 °F (36.4 °C) (Oral)   Resp 20   Wt 173 lb 15.1 oz (78.9 kg)   SpO2 100%   BMI 28.95 kg/m²      Telemetry: SB-NSR       Respiratory:  Positive for shortness of breath.   Cardiovascular:  Positive for chest pain.   Assessment/Plan:     Shortness of breath  - Chest Ct negative for PE, moderate   - pro    - on room air  - SOB all the time, but worse with laying flat, and ambulation     Atypical Chest pain  - trop x1 negative  - EKG with NSR   - pt stated to have pain worse with palpation and some position changes or some times with  laying down     CAD s/p CABG x2V (LIMA to LAD, SVG to OM, PDA too small) with pericardial cyst resection 7/2022  - s/p PCI prox LAD 8/2023  - on BB, repatha, ezetimibe, plavix     H/o DVTs/p thrombectomy 2/2022  - on xarelto      L common iliac vein  stenosis- S/p PTA 2022     HLD  - statin intolerant   - on repatha and ezetimibe     H/o smoking      H//o COPD     CAROLYN  - f/u with pulmonary as OP  - on CPAP- issues with tolerating, pt stated she has not been using it, since she cannot tolerate it.   - HCO3 normal on BMP     H/o hiatal hernia repair 2/2025     Prediabetes   - A1c  5.8     Plan  - euvolemic on exam    -continue plavix, xarelto,    -Continue zetia and metoprolol  - furhter recommendation pending echo result       Patient seen and examined independently.  Note reviewed and labs reviewed.  Agree with above assessment and plan with the below assessment and plan adjustments:     Denies prior HF history. Conversational dyspnea present. Denies acute CP, negative PE. Elevated proBNP, with risk factors for HFpEF.      Dyspnea   - suspect likely HFpEF related (elevated proBNP with dyspnea and risk factors for HFpEF)   - discussed plan for IV lasix, and RHC tomorrow for evaluation (hold AC)  - risks and benefit discussed with patient, she is agreeable to proceeding.      CAD s/p CABG   - per history, LIMA to Lad and SVG to OM  - continue medical therapy     History of prior DVT    - on xarelto, negative PE study per CTA.      HLD   - medical therapy.      CAROLYN   - not utilizing cpap           4/24 IM  less SOB. Has a headache.       Temp:  [97.5 °F (36.4 °C)-98.8 °F (37.1 °C)] 98.6 °F (37 °C)  Pulse:  [58-86] 85  Resp:  [18-22] 18  BP: (109-138)/(72-87) 109/77  SpO2:  [91 %-100 %] 96 %    Assessment & Plan:  #Shortness of breath, likely HFpEF  #Atypical chest pain  -EKG NSR, nonacute  -Trop < 3,   -CTA chest no PE or other acute abnormality   -Echo pEF, apical hypokinesis, normal diastolic function  -Given IV lasix  -RHC today  -Cardiology consult     #CAD s/p CABG  -Plavix, BB, Zetia     #COPD, stable  -Duonebs PRN     #HTN  -Metopolol     #HLD  -Zeita  -On Repatha outpatient     #DVT  -Xarelto held for cath     #Heartburn  -PRN pepcid     #CAROLYN  -Not  using CPAP            Right Heart Catheterization     Procedures:                 Right Heart Catheterization     Procedure details:     After consent obtained from patient, patient was brought to the cardiac catheterization lab. Patient was prepped and draped in usual sterile fashion. Lidocaine 1% was used to infiltrate site of access. Access was obtained with ultrasound guidance. Ultrasound guide and needle kit utilized to access site, and advanced sheath (7 fr) using modified seldinger technique.      RHC was performed using swan-daisy catheter and cardiac output using JUNI equation was also performed.  See below for data.         Indication:  dyspnea     Access Site: Right Internal Jugular Vein                            Sheath Size (Fr): 7     Complications:  None apparent.     Findings:     Condition 1 (at rest)  BP: 122/68 mmHg  RA: 2 mmHg  RV: 19/2 mmHg  PA: 18/4 (mean 9) mmHg  PCWP: 5 mmHg  TP mmHg     Juni  CO: 3.4 liters/min  CI: 1.9 liters/min/m2  PVR: 2.3 Wood units  PVRI: 4.3 Wood units/m2     Condition 2 - fluid bolus challenge (500 ml fluid)  PA - 21/6 (mean 11)  PCWP - 6 (end exp)  CO - 3.8  CI - 2.1  PVR - 1.8  PVRi - 3.4     Condition 3 - passive leg raise   PA - 23/8 (mean 12)  PCWP - 5   CO - 3.8  CI- 2.1   PVR - 2.9  PVRi - 5.3     Of note, RA saturation was also done - 58.4%     Impression:  1- Normal filling pressures   2 - low normal cardiac indices (has preserved LV function)  3. NO evidence of pHTN   4 - no evidence of exercise / fluid challenge induces pHTN or HFpEF       Disposition: Discussed results with patient - provided verbal instructions on post sheath care.   - do not believe cardiac cause of dyspnea is present, consider alternative evaluation (defer to pulmonary / hospitalist)  - no need for diuretics.   - risk factor modifications.            Cardiology    Symptomatically feels better with IV diuretic overnight. Still has dyspnea.      Medications 25  04/24/25       furosemide (Lasix) 10 mg/mL injection 40 mg  Dose: 40 mg  Freq: Once Route: IV  Start: 04/23/25 1645 End: 04/23/25 1746     1746 MV-Given              sodium chloride 0.9% infusion  Freq: On call Route: IV  Start: 04/24/25 0015 End: 04/24/25 0824   Admin Instructions:   Pre-op      0824 JV-New Bag          tiZANidine (Zanaflex) tab 2 mg  Dose: 2 mg  Freq: 2 times daily Route: OR  Start: 04/23/25 0900     1108 TR-Given   2110 LM-Given       0838 JV-Given   2100             acetaminophen (Tylenol Extra Strength) tab 500 mg  Dose: 500 mg  Freq: Every 4 hours PRN Route: OR  PRN Reasons: mild pain,Fever  PRN Comment: equal to or greater than 100.4  Start: 04/22/25 1631   Admin Instructions:   do not initiate oral therapy until 6-8 hours after the last IV acetaminophen dose if IV acetaminophen was used previously    0822 JV-Given [C]                methocarbamol (Robaxin) tab 500 mg  Dose: 500 mg  Freq: 2 times daily PRN Route: OR  PRN Reason: Muscle spasms  Start: 04/22/25 2341     0005 RH-Given                  Vitals (last day)       Date/Time Temp Pulse Resp BP SpO2 Weight O2 Device O2 Flow Rate (L/min) [facility]    04/24/25 1330 -- 73 -- 113/83 99 % -- -- -- AE    04/24/25 1245 -- 60 -- 113/81 97 % -- -- -- AE    04/24/25 1215 -- 62 -- 113/67 98 % -- -- -- AE    04/24/25 1145 -- 58 -- -- 99 % -- -- --     04/24/25 1143 98.1 °F (36.7 °C) 62 16 106/64 100 % -- None (Room air) -- JN    04/24/25 1130 -- 65 -- 108/80 96 % -- None (Room air) --     04/24/25 1115 -- -- -- -- -- -- None (Room air) --     04/24/25 1100 -- -- -- -- -- -- None (Room air) -- JV    04/24/25 0934 -- 69 -- -- 95 % -- -- --     04/24/25 0823 97.9 °F (36.6 °C) 72 17 128/91 100 % -- None (Room air) --     04/24/25 0442 98.6 °F (37 °C) 85 18 109/77 96 % -- None (Room air) --     04/24/25 0442 -- -- -- -- -- 169 lb 1.5 oz (76.7 kg) -- --     04/23/25 2318 98.2 °F (36.8 °C) 79 18 112/73 96 % -- None (Room air) --     04/23/25  2150 -- 86 -- -- 97 % -- -- --     04/23/25 2010 98.8 °F (37.1 °C) 74 18 117/74 91 % -- None (Room air) --     04/23/25 1827 -- 77 -- -- 97 % -- -- -- MB    04/23/25 1632 98.2 °F (36.8 °C) 62 18 138/87 96 % -- None (Room air) -- MB    04/23/25 1250 97.5 °F (36.4 °C) 73 20 117/74 100 % -- None (Room air) -- MB    04/23/25 1112 -- 60 -- -- -- -- -- -- NL    04/23/25 0914 -- 58 -- -- 97 % -- None (Room air) --     04/23/25 0900 -- 65 -- -- 95 % -- -- -- MB    04/23/25 0828 98.3 °F (36.8 °C) 78 22 137/72 97 % -- Nasal cannula 2 L/min MB    04/23/25 0516 -- 78 -- -- 94 % 173 lb 15.1 oz (78.9 kg) -- --     04/23/25 0352 97.8 °F (36.6 °C) 63 19 151/85 98 % -- -- --     04/23/25 0255 -- 68 -- -- 97 % -- -- --     04/23/25 0030 -- 72 -- -- 99 % -- Nasal cannula 3 L/min LM

## 2025-04-24 NOTE — DISCHARGE SUMMARY
[facility]     Jan Babb Patient Status:  Inpatient    1955 MRN [id number]   Location [facility] 8NE-A Attending No att. providers found   Hosp Day # 2 PCP Glenn Borjas MD     Date of Admission: 2025  Date of Discharge: 2025  Discharge Disposition: Home or Self Care    Discharge Diagnosis:   #Shortness of breath, possibly deconditioning  #Atypical chest pain, resolved  #CAD s/p CABG  #COPD, stable  #HTN  #HLD  #DVT  #Heartburn  #CAROLYN    History of Present Illness: Jan Babb is a 69 year old female with a past medical history of coronary artery disease status post CABG, COPD, hypertension who presented as a direct admission for shortness of breath.  Patient was seen in her cardiologist office earlier today.  She has been having worsening shortness of breath over the last several months.  Shortness of breath is worse with exertion.  She began having some left-sided chest pains as well today.  Denies any recent cough or fever, no nausea or vomiting, no diarrhea.  She has had symptoms similar to this before when she has had her bypass surgery.  She has no other acute complaints at this time.     Brief Synopsis: EKG showed no acute ischemic changes.  Troponin negative and proBNP slightly elevated at 41.  She was given a dose of Lasix and felt better.  CTA chest showed no PE or other acute abnormality.  Echo showed preserved EF and normal diastolic function.  She underwent right heart cath with normal pressures.  She was discharged in stable condition and recommended to follow-up with her pulmonologist.    Lace+ Score: 64  59-90 High Risk  29-58 Medium Risk  0-28   Low Risk  Patient was referred to the [facility].    TCM Follow-Up Recommendation:  LACE > 58: High Risk of readmission after discharge from the hospital.    Procedures during hospitalization:   Right heart cath    Incidental or significant findings and recommendations (brief  descriptions):  None    Lab/Test results pending at Discharge:   None    Consultants:  Cardiology    Discharge Medication List:     Discharge Medications        CONTINUE taking these medications        Instructions Prescription details   clopidogrel 75 MG Tabs  Commonly known as: Plavix      Take 1 tablet (75 mg total) by mouth in the morning.   Refills: 0     ezetimibe 10 MG Tabs  Commonly known as: Zetia      Take 1 tablet (10 mg total) by mouth nightly.   Refills: 0     famotidine 20 MG Tabs  Commonly known as: Pepcid      TAKE ONE TABLET BY MOUTH TWICE DAILY AS NEEDED FOR HEARTBURN   Quantity: 180 tablet  Refills: 0     Lasix 20 MG Tabs  Generic drug: furosemide      Take 0.5 tablets (10 mg total) by mouth daily as needed.   Refills: 0     methocarbamol 500 MG Tabs  Commonly known as: Robaxin      Take 1 tablet (500 mg total) by mouth 4 (four) times daily as needed.   Refills: 0     metoprolol succinate ER 25 MG Tb24  Commonly known as: Toprol XL      Take 1 tablet (25 mg total) by mouth in the morning.   Refills: 0     Repatha SureClick 140 MG/ML Soaj  Generic drug: Evolocumab      140 mg every 14 (fourteen) days.   Refills: 0     tiZANidine HCl 2 MG Caps  Commonly known as: ZANAFLEX      Take 1 capsule (2 mg total) by mouth 2 (two) times daily.   Refills: 0     Xarelto 10 MG Tabs  Generic drug: rivaroxaban      Take 1 tablet (10 mg total) by mouth daily with food.   Refills: 0              [place] No [place] substances prescribed at discharge.     Follow-up appointment:   Glenn Borjas MD  [address] [place] [address]  [facility] [id number] [phone]    Follow up in 1 week(s)      Bro Coleman IV, MD  [address] [facility] [address]  [facility] [id number] [phone]    Follow up in 2 week(s)  Chronic dyspnea, hx of COPD    Ismael Piedra MD  [address] [facility] [address]  [facility] [id number] [phone]    Follow up      Appointments for Next 30 Days 4/24/2025 - 5/24/2025        Date Arrival Time  Visit Type Length Department Provider     4/28/2025  2:15 PM  POST OP VISIT [[id number]] 15 min [facility] [place],Halley Awad MD    Patient Instructions:         Location Instructions:     Masks are optional for all patients and visitors, unless otherwise indicated. Note: A $50 fee will be charged for missed appointments or same-day cancellations. Please provide 24 hours' notice if you need to cancel or reschedule your appointment.               5/2/2025  1:40 PM  [facility] SCR MEMO [[id number]] 20 min [facility] Mammography - [facility] FLORINDA RM1     5/2/2025  1:40 PM  [facility] SCR MEMO [[id number]] 20 min [facility] DEXA [facility] DEXA RM1    Patient Instructions:     You may be subject to a fee if you do not show up for your appointment or you cancel within 24 hours of your appointment.    Please arrive 15 minutes prior to your appointment.    If breastfeeding, pump or breastfeed one hour prior to your appointment time.    Continuous glucose monitors must be removed so the appointment should be scheduled near the normal replacement date.    It is important to bring your previous mammography films to your appointment so that the radiologist has previous images to compare this exam to. Having your previous mammograms on file helps the radiologist notice subtle changes in your breast tissue that can alert us to a need for further studies. Without these images, the radiologist will not be able to detect the subtle changes and your final reading will be delayed until we receive them.    It is important that the annual screening mammogram be scheduled at least a year and a day after your last screening mammogram to ensure your insurance plan will cover the cost, unless you are experiencing breast related symptoms.    Do NOT use perfumes, deodorant, talcum powder, lotions, or creams on your breasts or underarms. They leave a coating that may be picked up by the x-rays, thereby  distorting the mammogram.    Wear a two piece outfit the day of the exam. This allows you to be more comfortable during the exam.      Location Instructions:     You may be subject to a fee if you do not show up for your appointment or you cancel within 24 hours of your appointment.  Your appointment will be at the [facility] located at [address] [place] [address].&nbsp; Mammography is in [address] on the first floor of the building.&nbsp; The phone number is [phone].  If any imaging exam related to this procedure has been done at a facility other than an [facility], please bring a copy of the previous films or CDs with you. If we do not have copies of your prior images (not performed at [facility] or [facility]) at the time of your exams, your results may be delayed and possibly result in rescheduling your appointment. You can also have your previous images sent to [facility] or [facility] prior to your appointment. Films may be mailed to:  [facility] Attn: Radiology Imaging Library [address] [place] [address]  [facility] Attn: Radiology File Room [address] [place] [address]   You will be asked to fill out a history form prior to the exam.  Please bring your insurance card and photo ID. You will also need to bring your doctor's order unless your physician's office submitted the order electronically or faxed the order. Without the order, your test may be delayed or postponed.  The statements below are provided to all patients receiving an exam in our imaging department so you can be made aware of our procedures, which are designed for your safety and the best possible imaging.  Children: Children under the age of 12 must have another adult caregiver with them. Please do not bring your child/children without a caregiver. Because of the highly sensitive equipment and privacy of all our patients, children  will not be permitted in the exam rooms, unless otherwise noted and in accordance with departmental policy.  MyChart: Having an electronic [facility] account will allow you to view your medical records, information regarding appointments, as well as reports of your imaging and lab results.  PATIENT RESPONSIBILITY ESTIMATE  - (Estimate) We will provide you with your estimated remaining deductible and coinsurance balance for your services at the time of check in.  - (Payment) Please be aware that you may be asked for payment at the time of service.  Masks are optional for all patients and visitors, unless otherwise indicated.                      Vital signs:  Temp:  [97.9 °F (36.6 °C)-98.8 °F (37.1 °C)] 98.1 °F (36.7 °C)  Pulse:  [58-86] 73  Resp:  [16-18] 16  BP: (106-128)/(64-91) 113/83  SpO2:  [91 %-100 %] 99 %    Physical Exam:    See progress note dated same day.  -----------------------------------------------------------------------------------------------  PATIENT DISCHARGE INSTRUCTIONS: See electronic chart    Vasu Costa DO    Time spent:  33 minutes

## 2025-04-24 NOTE — PROGRESS NOTES
[facility]   part of [facility]    Advanced Heart Failure Progress Note    Jan Babb Patient Status:  Inpatient    1955 MRN [id number]   Location [facility] 8NE-A Attending Vasu Costa,    Hosp Day # 2 PCP Glenn Borjas MD     Subjective:    Symptomatically feels better with IV diuretic overnight. Still has dyspnea. Denies CP, syncope otherwise.     Objective:  BP (!) 128/91 (BP Location: Right arm)   Pulse 72   Temp 97.9 °F (36.6 °C) (Oral)   Resp 17   Wt 169 lb 1.5 oz (76.7 kg)   SpO2 100%   BMI 28.14 kg/m²     Temp (24hrs), Av.2 °F (36.8 °C), Min:97.5 °F (36.4 °C), Max:98.8 °F (37.1 °C)      Intake/Output:    Intake/Output Summary (Last 24 hours) at 2025 0930  Last data filed at 2025 0824  Gross per 24 hour   Intake 600 ml   Output 450 ml   Net 150 ml       Wt Readings from Last 3 Encounters:   25 169 lb 1.5 oz (76.7 kg)   25 176 lb (79.8 kg)   02/10/25 185 lb (83.9 kg)       Allergies:  Allergies[1]    Physical Exam:  Blood pressure (!) 128/91, pulse 72, temperature 97.9 °F (36.6 °C), temperature source Oral, resp. rate 17, weight 169 lb 1.5 oz (76.7 kg), SpO2 100%.    General: Alert and oriented in no apparent distress.  HEENT: No focal deficits.  Neck: above clavicle JVD present, normal ROM.   Cardiac: Regular rate and rhythm, S1, S2 normal, no rubs or gallops.  Lungs: Clear without wheezes, rales, rhonchi or dullness.  Dyspnea present at baseline.   Abdomen: mild tenderness to palpation at surgical site from prior procedure.   Extremities: minimal LE edema.   Neurologic: Alert and oriented, normal affect.  Skin: Warm and dry.     Laboratory/Data:      Labs:     Lab Results   Component Value Date    INR 1.05 2024    INR 2.16 (H) 09/10/2024    INR 1.31 (H) 2022     No results for input(s): \"BNPML\" in the last 168 hours.  BUN (mg/dL)   Date Value   2025 7 (L)   2025 6 (L)   2025 10     Blood Urea Nitrogen (mg/dL)    Date Value   03/11/2022 13.0   01/07/2022 11.0   11/23/2021 12.0     Creatinine (mg/dL)   Date Value   04/23/2025 0.96   04/22/2025 0.93   02/12/2025 0.96   03/11/2022 0.89   01/07/2022 1.01 (H)   11/23/2021 0.86       Medications:  Current Hospital Medications[2]    Assessment and Plan:  Problem List[3]    Dyspnea   - suspect likely HFpEF related (elevated proBNP with dyspnea and risk factors for HFpEF)   - RHC to evaluate filling pressures.   - risks and benefit discussed with patient, she is agreeable to proceeding.      CAD s/p CABG   - per history, LIMA to Lad and SVG to OM  - continue medical therapy     History of prior DVT    - on xarelto, negative PE study per CTA.      HLD   - medical therapy.      CAROLYN   - not utilizing cpap, encourage use vs alternative options.     Ronan Fletcher MD   Advanced Heart Failure and Transplant Cardiology   [facility] ([facility])     L3       [1]   Allergies  Allergen Reactions    Moxifloxacin Hcl In Nacl SHORTNESS OF BREATH    Statins OTHER (SEE COMMENTS)     Bone pain     Venlafaxine OTHER (SEE COMMENTS)     Unknown     Ketorolac Tromethamine ANXIETY    Pregabalin OTHER (SEE COMMENTS)    Trazodone OTHER (SEE COMMENTS)     \"HYPER\"    Amitriptyline FATIGUE    Risperidone NAUSEA ONLY   [2]   Current Facility-Administered Medications   Medication Dose Route Frequency    tiZANidine (Zanaflex) tab 2 mg  2 mg Oral BID    famotidine (Pepcid) tab 20 mg  20 mg Oral Daily PRN    clopidogrel (Plavix) tab 75 mg  75 mg Oral Daily    ezetimibe (Zetia) tab 10 mg  10 mg Oral Nightly    metoprolol succinate ER (Toprol XL) 24 hr tab 25 mg  25 mg Oral Daily    [Held by provider] rivaroxaban (Xarelto) tab 10 mg  10 mg Oral Daily with food    acetaminophen (Tylenol Extra Strength) tab 500 mg  500 mg Oral Q4H PRN    melatonin tab 3 mg  3 mg Oral Nightly PRN    glycerin-hypromellose- (Artificial Tears) 0.2-0.2-1 % ophthalmic solution 1 drop  1 drop Both Eyes QID PRN    sodium  chloride (Saline Mist) 0.65 % nasal solution 1 spray  1 spray Each Nare Q3H PRN    polyethylene glycol (PEG 3350) (Miralax) 17 g oral packet 17 g  17 g Oral Daily PRN    sennosides (Senokot) tab 17.2 mg  17.2 mg Oral Nightly PRN    bisacodyl (Dulcolax) 10 MG rectal suppository 10 mg  10 mg Rectal Daily PRN    fleet enema (Fleet) rectal enema 133 mL  1 enema Rectal Once PRN    ondansetron (Zofran) 4 MG/2ML injection 4 mg  4 mg Intravenous Q6H PRN    metoclopramide (Reglan) 5 mg/mL injection 10 mg  10 mg Intravenous Q8H PRN    benzonatate (Tessalon) cap 200 mg  200 mg Oral TID PRN    guaiFENesin (Robitussin) 100 MG/5 ML oral liquid 200 mg  200 mg Oral Q4H PRN    ipratropium-albuterol (Duoneb) 0.5-2.5 (3) MG/3ML inhalation solution 3 mL  3 mL Nebulization Q4H PRN    methocarbamol (Robaxin) tab 500 mg  500 mg Oral BID PRN   [3]   Patient Active Problem List  Diagnosis    Post-menopausal    Dyslipidemia    High triglycerides    High serum low-density lipoprotein (LDL)    Arthralgia of left acromioclavicular joint    Cervical disc disease    Heart murmur    Lumbar disc disease    Chronic back pain    COPD (chronic obstructive pulmonary disease) (HCC)    Generalized anxiety disorder with panic attacks    Inflammatory spondylopathy, unspecified spinal region    Neck mass    Deep vein thrombosis (DVT) of left lower extremity, unspecified chronicity, unspecified vein (HCC)    Anemia    Hyperglycemia    Acute deep vein thrombosis (DVT) of femoral vein of left lower extremity (HCC)    Tobacco use disorder    Hospital discharge follow-up    Current use of long term anticoagulation    CAROLYN on CPAP    Atherosclerosis of aorta    Coronary artery disease involving coronary bypass graft    Ectatic abdominal aorta    Stage 3a chronic kidney disease (HCC)    Thrombophilia (HCC)    Pulmonary emphysema (HCC)    Fatty liver    Hiatal hernia    Non-recurrent bilateral inguinal hernia without obstruction or gangrene    Prediabetes    Pre-op  testing    Benign essential HTN    Gastroesophageal reflux disease with esophagitis    Shortness of breath

## 2025-04-24 NOTE — PROGRESS NOTES
Problem: Shortness of breath      Data: Pt is A&Ox4. . Hx CAROLYN no CPAP. On tele. Anti coag on hold. Tolerating diet. Voids. Monitoring output.       Intervention:  pt NPO at midnight for RHC. Consent signed in chart. Received 1x dose IV lasix earlier.      Edu: refusing bed alarm and call light within reach.

## 2025-04-24 NOTE — OCCUPATIONAL THERAPY NOTE
OCCUPATIONAL THERAPY EVALUATION - INPATIENT     Room Number: 8624/8624-A  Evaluation Date: 4/23/2025  Type of Evaluation: Initial  Presenting Problem: SOB    Physician Order: IP Consult to Occupational Therapy  Reason for Therapy: ADL/IADL Dysfunction and Discharge Planning    OCCUPATIONAL THERAPY ASSESSMENT   Patient is currently functioning near baseline with toileting, bathing, upper body dressing, lower body dressing, bed mobility, and transfers. Prior to admission, patient's baseline is independent.  Patient is requiring minimum assistance as a result of the following impairments: decreased endurance and medical status. Occupational Therapy will continue to follow for duration of hospitalization.    Patient will benefit from continued skilled OT Services with no additional skilled services but increased support at home    History Related to Current Admission: Patient is a 69 year old female admitted on 4/22/2025 with Presenting Problem: SOB. Co-Morbidities : s/p CABGx2 (2022), COPD, hypertension, DVT, CKD, CAROLYN    Recommendations for nursing staff:   Transfers: supervision with RW  Toileting location: toilet    EVALUATION SESSION    Patient Start of Session: Pt wad found in her bed with her spouse at the bedside.     FUNCTIONAL TRANSFER ASSESSMENT  Sit to Stand: Edge of Bed  Edge of Bed: Supervision    BED MOBILITY  Supine to Sit : Supervision    BALANCE ASSESSMENT     FUNCTIONAL ADL ASSESSMENT     ACTIVITY TOLERANCE:                          O2 SATURATIONS  Pt stating 6-7 on NIELS dyspnea scale        Cognition  WFL    Upper extremity  WFL    EDUCATION PROVIDED  Patient Education : Role of Occupational Therapy; Plan of Care; Discharge Recommendations; Functional Transfer Techniques; Fall Prevention  Patient's Response to Education: Verbalized Understanding    Equipment used: RW, gait belt   Demonstrates functional use, Would benefit from additional trial     Therapist comments: supine>sit>stand to RW>walk within  hallway>walk to sit in chair     Patient End of Session: Up in chair, Needs met, Call light within reach, RN aware of session/findings, All patient questions and concerns addressed, Family present, Hospital anti-slip socks    OCCUPATIONAL PROFILE    HOME SITUATION  Type of Home: House  Home Layout: One level  Lives With: Spouse    Toilet and Equipment: Standard height toilet  Shower/Tub and Equipment: Walk-in shower  Other Equipment: None    Occupation/Status: Retired  Hand Dominance: Right  Drives: Yes  Patient Regularly Uses: Glasses (Pt owns a RW, but does not regularly use, typically furniture walker)    Prior Level of Function: Pt typically independent with ADLs and mobility. Pt does not use AD.    SUBJECTIVE   \"You can't go by that.\" Re: pulse ox     PAIN ASSESSMENT  Ratin  Location: N/A       OBJECTIVE  Precautions: None  Fall Risk: Standard fall risk    WEIGHT BEARING RESTRICTION       ASSESSMENTS    AM-PAC '6-Clicks' Inpatient Daily Activity Short Form  -   Putting on and taking off regular lower body clothing?: A Little  -   Bathing (including washing, rinsing, drying)?: A Little  -   Toileting, which includes using toilet, bedpan or urinal? : A Little  -   Putting on and taking off regular upper body clothing?: A Little  -   Taking care of personal grooming such as brushing teeth?: None  -   Eating meals?: None    AM-PAC Score:  Score: 20  Approx Degree of Impairment: 38.32%  Standardized Score (AM-PAC Scale): 42.03    PLAN  OT Device Recommendations: TBD  OT Treatment Plan: Balance activities, Energy conservation/work simplification techniques, ADL training, Functional transfer training, Endurance training, Patient/Family education, Patient/Family training, Equipment eval/education, Compensatory technique education, Continued evaluation  Rehab Potential : Good  Frequency: Daily  Number of Visits to Meet Established Goals: 3    ADL Goals  Patient will perform toileting with supervision and AE  PRN  Patient will perform LB dressing with supervision and AE PRN    Functional Transfer Goals  Patient will perform bed mobility supine to sit with supervision  Patient will perform bed mobility sit to supine with supervision  Patient will perform toilet transfer with supervision      Patient Evaluation Complexity Level:   Occupational Profile/Medical History MODERATE - Expanded review of history including review of medical or therapy record   Specific performance deficits impacting engagement in ADL/IADL MODERATE  3 - 5 performance deficits   Client Assessment/Performance Deficits MODERATE - Comorbidities and min to mod modifications of tasks    Clinical Decision Making MODERATE - Analysis of occupational profile, detailed assessments, several treatment options    Overall Complexity MODERATE     OT Session Time: 30 minutes  Therapeutic Activity: 15 minutes

## 2025-04-24 NOTE — PLAN OF CARE
Problem: Patient/Family Goals  Goal: Patient/Family Long Term Goal  Description: Patient's Long Term Goal: discharge  Interventions:- appropriate consults, echo   - See additional Care Plan goals for specific interventions  Outcome: Progressing  Goal: Patient/Family Short Term Goal  Description: Patient's Short Term Goal: feel better  Interventions: - appropriate consults, echo   -NPO for RHC 4/24  - See additional Care Plan goals for specific interventions  Outcome: Progressing

## 2025-04-25 ENCOUNTER — TELEPHONE (OUTPATIENT)
Facility: LOCATION | Age: 70
End: 2025-04-25

## 2025-04-25 ENCOUNTER — PATIENT OUTREACH (OUTPATIENT)
Dept: CASE MANAGEMENT | Age: 70
End: 2025-04-25

## 2025-04-25 LAB
ATRIAL RATE: 61 BPM
P AXIS: 10 DEGREES
P-R INTERVAL: 146 MS
Q-T INTERVAL: 454 MS
QRS DURATION: 72 MS
QTC CALCULATION (BEZET): 457 MS
R AXIS: 9 DEGREES
T AXIS: 22 DEGREES
VENTRICULAR RATE: 61 BPM

## 2025-04-25 NOTE — TELEPHONE ENCOUNTER
Patient says that she just got out of the hospital and wants Dr. Collier to take a look at her CT scan. She says there is something going on with the surgery that she did.     Please advise   CB# [phone]

## 2025-04-25 NOTE — TELEPHONE ENCOUNTER
LVM informing patient that the surgeon has seen and at this time there is nothing to do for the hernia seen on the scan.  It will be discussed further at there appointment

## 2025-04-25 NOTE — PROGRESS NOTES
KALI request    TCC appointment request (discharged 04/24)    [facility]  [address]  [place] [address]  [phone]    Attempt #1:  Left message on voicemail for patient to call transitions specialist back to schedule follow up appointments. Provided Transitions specialist scheduling phone number [phone].

## 2025-04-28 NOTE — PROGRESS NOTES
KALI request     TCC appointment request (discharged 04/24)     [facility]  [address]  [place] [address]  [phone]     Attempt #2:  Left message on voicemail for patient to call transitions specialist back to schedule follow up appointments. Provided Transitions specialist scheduling phone number [phone].

## 2025-04-29 NOTE — PROGRESS NOTES
KALI request     TCC appointment request (discharged 04/24)     [facility]  [address]  [place] [address]  [phone]  Multiple attempts w/no calls back; no appointment made    Attempt #3:  Left message on voicemail for patient to call transitions specialist back to schedule follow up appointments. Provided Transitions specialist scheduling phone number [phone]. Closing encounter. Will re-open if patient returns call.

## 2025-04-30 ENCOUNTER — MED REC SCAN ONLY (OUTPATIENT)
Dept: FAMILY MEDICINE CLINIC | Facility: CLINIC | Age: 70
End: 2025-04-30

## 2025-05-28 ENCOUNTER — HOSPITAL ENCOUNTER (OUTPATIENT)
Dept: MAMMOGRAPHY | Age: 70
Discharge: HOME | End: 2025-05-28
Attending: FAMILY MEDICINE
Payer: MEDICARE

## 2025-05-28 DIAGNOSIS — Z12.31 VISIT FOR SCREENING MAMMOGRAM: ICD-10-CM

## 2025-05-28 PROCEDURE — 77067 SCR MAMMO BI INCL CAD: CPT

## 2025-05-28 PROCEDURE — 77063 BREAST TOMOSYNTHESIS BI: CPT

## 2025-06-09 ENCOUNTER — LAB ENCOUNTER (OUTPATIENT)
Dept: LAB | Age: 70
End: 2025-06-09
Attending: INTERNAL MEDICINE
Payer: MEDICARE

## 2025-06-09 DIAGNOSIS — E78.5 HYPERLIPEMIA: Primary | ICD-10-CM

## 2025-06-09 LAB
ANION GAP SERPL CALC-SCNC: 8 MMOL/L (ref 0–18)
BUN BLD-MCNC: 9 MG/DL (ref 9–23)
CALCIUM BLD-MCNC: 9.6 MG/DL (ref 8.7–10.6)
CHLORIDE SERPL-SCNC: 108 MMOL/L (ref 98–112)
CHOLEST SERPL-MCNC: 133 MG/DL (ref ?–200)
CO2 SERPL-SCNC: 28 MMOL/L (ref 21–32)
CREAT BLD-MCNC: 1 MG/DL (ref 0.55–1.02)
EGFRCR SERPLBLD CKD-EPI 2021: 60 ML/MIN/1.73M2 (ref 60–?)
FASTING PATIENT LIPID ANSWER: YES
FASTING STATUS PATIENT QL REPORTED: YES
GLUCOSE BLD-MCNC: 98 MG/DL (ref 70–99)
HDLC SERPL-MCNC: 41 MG/DL (ref 40–59)
LDLC SERPL CALC-MCNC: 60 MG/DL (ref ?–100)
NONHDLC SERPL-MCNC: 92 MG/DL (ref ?–130)
OSMOLALITY SERPL CALC.SUM OF ELEC: 297 MOSM/KG (ref 275–295)
POTASSIUM SERPL-SCNC: 4.3 MMOL/L (ref 3.5–5.1)
SODIUM SERPL-SCNC: 144 MMOL/L (ref 136–145)
TRIGL SERPL-MCNC: 194 MG/DL (ref 30–149)
VLDLC SERPL CALC-MCNC: 29 MG/DL (ref 0–30)

## 2025-06-09 PROCEDURE — 80061 LIPID PANEL: CPT

## 2025-06-09 PROCEDURE — 80048 BASIC METABOLIC PNL TOTAL CA: CPT

## 2025-06-09 PROCEDURE — 36415 COLL VENOUS BLD VENIPUNCTURE: CPT

## 2025-06-23 ENCOUNTER — PATIENT MESSAGE (OUTPATIENT)
Dept: FAMILY MEDICINE CLINIC | Facility: CLINIC | Age: 70
End: 2025-06-23

## 2025-07-03 ENCOUNTER — LAB ENCOUNTER (OUTPATIENT)
Dept: LAB | Age: 70
End: 2025-07-03
Attending: FAMILY MEDICINE
Payer: MEDICARE

## 2025-07-03 DIAGNOSIS — R73.03 PREDIABETES: ICD-10-CM

## 2025-07-03 LAB
EST. AVERAGE GLUCOSE BLD GHB EST-MCNC: 117 MG/DL (ref 68–126)
HBA1C MFR BLD: 5.7 % (ref ?–5.7)

## 2025-07-03 PROCEDURE — 83036 HEMOGLOBIN GLYCOSYLATED A1C: CPT

## 2025-07-03 PROCEDURE — 36415 COLL VENOUS BLD VENIPUNCTURE: CPT

## 2025-07-08 NOTE — PAT NURSING NOTE
PreOp Instructions     You are scheduled for: a Cardiac Procedure     Date of Procedure: 07/09/25     Diet Instructions: Do not eat or drink anything after midnight including gum, mints, candy, etc.     Medications: Take an Aspirin 81 mg and Plavix 75mg the morning of your procedure. You can also take Metoprolol. Medications you are allowed to take can be taken with a sip of water the morning of your procedure.     Do not take the following Blood Thinner(s): Continue to hold Xarelto, your last dose was on Monday 7/7.     Medications to Stop:   DO NOT TAKE Lasix the morning of your procedure.  DO NOT TAKE any herbal supplements and vitamins the morning of your procedure.     Sleep Apnea: You have sleep apnea, please bring your mask and tubing with you just in case you will need to spend the night for any reason.     Skin Prep : Shower with antibacterial soap using a clean washcloth, prior to procedure. Once dried off, no lotions/powders/creams/ointments, etc., Do not shave the procedure area, this will be completed at the hospital during the preparation phase.     Arrival Time: You will receive a phone call later this afternoon between 3:00 pm - 6:00 pm with your arrival time. If you haven't received a phone call, please check your voicemail messages., If you did not receive a voice mail and it is after 6:00 pm, please call the nursing supervisor at [phone].    Driving After Procedure: Sedation will be given so you WILL NOT be able to drive home. You will need a responsible adult  to drive you home. You can NOT take uber or taxi unless approved by your physician in advance.     Discharge Teaching: Your nurse will give you specific instructions before discharge, Most people can resume normal activities in 2-3 days, Any questions, please call the physician's office            parking is available starting at 6 am or park in the [place] parking garage at [facility]. Check in at the [facility]  reception desk. Our  will be there to check you in for your procedure. Please bring your insurance cards and ID with you.                                                                                                                                      Please DO NOT respond to this message, the inbasket is not monitored for messages. For any questions, please call the physician's office.

## 2025-07-09 ENCOUNTER — HOSPITAL ENCOUNTER (OUTPATIENT)
Dept: INTERVENTIONAL RADIOLOGY/VASCULAR | Facility: HOSPITAL | Age: 70
Discharge: HOME | End: 2025-07-09
Attending: INTERNAL MEDICINE | Admitting: INTERNAL MEDICINE
Payer: MEDICARE

## 2025-07-09 VITALS
DIASTOLIC BLOOD PRESSURE: 68 MMHG | SYSTOLIC BLOOD PRESSURE: 99 MMHG | BODY MASS INDEX: 29.02 KG/M2 | RESPIRATION RATE: 11 BRPM | TEMPERATURE: 97 F | OXYGEN SATURATION: 99 % | HEART RATE: 49 BPM | HEIGHT: 64 IN | WEIGHT: 170 LBS

## 2025-07-09 DIAGNOSIS — I25.10 CAD (CORONARY ARTERY DISEASE): ICD-10-CM

## 2025-07-09 DIAGNOSIS — I20.0 UNSTABLE ANGINA (HCC): ICD-10-CM

## 2025-07-09 PROCEDURE — 93459 L HRT ART/GRFT ANGIO: CPT | Performed by: INTERNAL MEDICINE

## 2025-07-09 PROCEDURE — 99152 MOD SED SAME PHYS/QHP 5/>YRS: CPT | Performed by: INTERNAL MEDICINE

## 2025-07-09 RX ORDER — LIDOCAINE HYDROCHLORIDE 10 MG/ML
INJECTION, SOLUTION EPIDURAL; INFILTRATION; INTRACAUDAL; PERINEURAL
Status: COMPLETED
Start: 2025-07-09 | End: 2025-07-09

## 2025-07-09 RX ORDER — MIDAZOLAM HYDROCHLORIDE 1 MG/ML
INJECTION INTRAMUSCULAR; INTRAVENOUS
Status: COMPLETED
Start: 2025-07-09 | End: 2025-07-09

## 2025-07-09 RX ORDER — SODIUM CHLORIDE 9 MG/ML
INJECTION, SOLUTION INTRAVENOUS
Status: DISCONTINUED | OUTPATIENT
Start: 2025-07-10 | End: 2025-07-09 | Stop reason: HOSPADM

## 2025-07-09 RX ORDER — DIPHENHYDRAMINE HYDROCHLORIDE 50 MG/ML
INJECTION, SOLUTION INTRAMUSCULAR; INTRAVENOUS
Status: COMPLETED
Start: 2025-07-09 | End: 2025-07-09

## 2025-07-09 RX ORDER — HEPARIN SODIUM 5000 [USP'U]/ML
INJECTION, SOLUTION INTRAVENOUS; SUBCUTANEOUS
Status: COMPLETED
Start: 2025-07-09 | End: 2025-07-09

## 2025-07-09 NOTE — PROGRESS NOTES
Pt post LHC. Pt sleepy but arousable. Vss. Right femoral artery access site is CDI.    Recovery complete per protocol. Vss. Pt has been sitting up, voided and walked. Right fem site remains soft with no signs of bleeding or hematoma. Discharge instructions reviewed, iv dc'd and pt discharged home with  driving.

## 2025-07-09 NOTE — DISCHARGE INSTRUCTIONS
HOME CARE INSTRUCTIONS FOLLOWING CORONARY ANGIOGRAPHY,  PERIPHERAL ANGIOGRAPHY, ANGIOPLASTY (PTCA/PTA) OR INSERTION  OF STENT IN THE CORONARY, CAROTID, AND/OR PERIPHERAL ARTERIES      Activity:   DO NOT drive after the procedure. You may resume driving late the following day according to the nurse or physician’s instructions   Plan on resting and relaxing tonight and tomorrow   Resume your normal activity after 48 hours, or as instructed by your physician   Do not lift anything over 10 pounds for the next 24 hours   Avoid sexual activity for the next 24 hours   Avoid drinking alcohol for the next 24 hours   If the groin site was used, avoid repeated stair use and excessive walking for the next 24 hours       What is Normal?   A small lump at the procedure site associated with mild tenderness when touched   The procedure site may be bruised or discolored   There may be a small amount of drainage on the bandage    Special Instructions:   Drink plenty of fluids during the next 24 hours to “flush” the contrast from your system   The bandage is to remain in place for 24 hours   Keep the bandage clean and dry   DO NOT submerge the procedure site for 72 hours (no bath tubs or pools).    After 24 hours, you must remove the bandage   You should shower after removing the bandage, and wash the procedure site gently with soap and water   If you choose to wear a bandage for a few days, make sure it remains clean and dry and that it is changed daily    When you should NOTIFY YOUR PHYSICIAN:   Bleeding can occur at the procedure site - both on the outside of the skin and/or beneath the surface of the skin   Swelling or a large lump at the procedure site can occur, which may be accompanied by moderate to severe pain. If either of the above occurs, lie down flat. Have someone apply pressure to the procedure site with both hands, as instructed by the nurse. Hold pressure for 20 minutes and the bleeding should stop. Notify your  physician of the occurrence. If the bleeding does not stop, call 911 and continue to apply pressure   If you experience signs of a fever, temperature >101 degrees, chills, infection (redness, swelling, thick yellow drainage, or a foul odor from the procedure site)   If you notice any numbness, tingling, or loss of feeling to your leg or foot for groin access      Other:  - You may resume your present diet, unless otherwise specified by your physician.   - You may resume all of your medications as prescribed, unless otherwise directed by your physician. A list of your medications was provided to you at discharge.  - Please call your physician's office for a follow-up appointment. You should be seen in 2 weeks.  - Do not make any personal/business decisions and/or sign any legal documents for the next 24 hours.

## 2025-07-19 DIAGNOSIS — K44.9 HIATAL HERNIA: ICD-10-CM

## 2025-07-21 RX ORDER — FAMOTIDINE 20 MG/1
20 TABLET, FILM COATED ORAL 2 TIMES DAILY PRN
Qty: 180 TABLET | Refills: 0 | Status: SHIPPED | OUTPATIENT
Start: 2025-07-21

## 2025-07-21 NOTE — TELEPHONE ENCOUNTER
Last Office Visit: 04/03/2025    Last Refill:   Medication Quantity Refills Start End   FAMOTIDINE 20 MG Oral Tab 180 tablet 0 4/21/2025 --     Return to Clinic: 10/03/2025    Protocol: passed

## 2025-07-22 NOTE — PROCEDURES
[facility]    PATIENT'S NAME: ZACARIAS SUAREZ   ATTENDING PHYSICIAN: Ismael Piedra M.D.   OPERATING PHYSICIAN: Ismael Piedra M.D.   PATIENT ACCOUNT#:   [id number]    LOCATION:  [address]  MEDICAL RECORD #:   [id number]       YOB: 1955  ADMISSION DATE:       07/09/2025      OPERATION DATE:  07/09/2025    CARDIAC PROCEDURE TRANSCRIPTION    CARDIAC CATHETERIZATION     PREOPERATIVE DIAGNOSIS:    POSTOPERATIVE DIAGNOSIS:    PROCEDURE PERFORMED:      INDICATIONS:  Patient with prior bypass surgery.    DESCRIPTION OF PROCEDURE:  Left ventriculogram was done in standard 30-degree SMITH.  The ventricle was very hyperdynamic with some apical hypokinesis.    Left coronary arteriography:  The stent to the LAD is widely patent.  The circumflex has a 90% proximal stenosis.    Right coronary artery is occluded.    Vein graft to the obtuse marginal is patent.  There is disease in the obtuse marginal prior to the circumflex.  I think the body of the circumflex just feeds some AV grooves.  I am not sure it is worth intervening unless there is severe angina.    There is about a 30% lesion in the left subclavian with a tiny patent LIMA to the LAD.    RECOMMENDATIONS:  Aggressive medical therapy.    Dictated By Ismael Piedra M.D.  d: 07/22/2025 09:00:10  t: 07/22/2025 12:12:36  [facility] [id number]  [facility] [place]

## 2025-07-23 ENCOUNTER — RESULTS FOLLOW-UP (OUTPATIENT)
Dept: INTERVENTIONAL RADIOLOGY/VASCULAR | Facility: HOSPITAL | Age: 70
End: 2025-07-23

## 2025-08-11 ENCOUNTER — OFFICE VISIT (OUTPATIENT)
Dept: FAMILY MEDICINE CLINIC | Facility: CLINIC | Age: 70
End: 2025-08-11

## 2025-08-11 VITALS
OXYGEN SATURATION: 98 % | WEIGHT: 171 LBS | RESPIRATION RATE: 16 BRPM | SYSTOLIC BLOOD PRESSURE: 118 MMHG | DIASTOLIC BLOOD PRESSURE: 78 MMHG | BODY MASS INDEX: 29 KG/M2 | HEART RATE: 60 BPM | TEMPERATURE: 98 F

## 2025-08-11 DIAGNOSIS — L24.7 CONTACT DERMATITIS AND ECZEMA DUE TO PLANT: Primary | ICD-10-CM

## 2025-08-11 PROCEDURE — 1160F RVW MEDS BY RX/DR IN RCRD: CPT | Performed by: NURSE PRACTITIONER

## 2025-08-11 PROCEDURE — 1159F MED LIST DOCD IN RCRD: CPT | Performed by: NURSE PRACTITIONER

## 2025-08-11 PROCEDURE — 99213 OFFICE O/P EST LOW 20 MIN: CPT | Performed by: NURSE PRACTITIONER

## 2025-08-11 RX ORDER — PREDNISONE 20 MG/1
TABLET ORAL
Qty: 21 TABLET | Refills: 0 | Status: SHIPPED | OUTPATIENT
Start: 2025-08-11

## 2025-08-13 ENCOUNTER — OFFICE VISIT (OUTPATIENT)
Dept: FAMILY MEDICINE CLINIC | Facility: CLINIC | Age: 70
End: 2025-08-13

## 2025-08-13 VITALS
HEIGHT: 64 IN | RESPIRATION RATE: 18 BRPM | TEMPERATURE: 98 F | BODY MASS INDEX: 29.19 KG/M2 | DIASTOLIC BLOOD PRESSURE: 64 MMHG | SYSTOLIC BLOOD PRESSURE: 136 MMHG | WEIGHT: 171 LBS | HEART RATE: 76 BPM

## 2025-08-13 DIAGNOSIS — R35.0 FREQUENCY OF URINATION: ICD-10-CM

## 2025-08-13 DIAGNOSIS — R30.0 DYSURIA: ICD-10-CM

## 2025-08-13 LAB
APPEARANCE: CLEAR
BILIRUBIN: NEGATIVE
GLUCOSE (URINE DIPSTICK): NEGATIVE MG/DL
KETONES (URINE DIPSTICK): NEGATIVE MG/DL
MULTISTIX LOT#: ABNORMAL NUMERIC
NITRITE, URINE: NEGATIVE
OCCULT BLOOD: NEGATIVE
PH, URINE: 5.5 (ref 4.5–8)
PROTEIN (URINE DIPSTICK): NEGATIVE MG/DL
SPECIFIC GRAVITY: 1 (ref 1–1.03)
URINE-COLOR: COLORLESS
UROBILINOGEN,SEMI-QN: 0.2 MG/DL (ref 0–1.9)

## 2025-08-13 PROCEDURE — 81003 URINALYSIS AUTO W/O SCOPE: CPT | Performed by: FAMILY MEDICINE

## 2025-08-13 PROCEDURE — 1159F MED LIST DOCD IN RCRD: CPT | Performed by: FAMILY MEDICINE

## 2025-08-13 PROCEDURE — 99214 OFFICE O/P EST MOD 30 MIN: CPT | Performed by: FAMILY MEDICINE

## 2025-08-13 PROCEDURE — 87086 URINE CULTURE/COLONY COUNT: CPT | Performed by: FAMILY MEDICINE

## 2025-08-13 PROCEDURE — G2211 COMPLEX E/M VISIT ADD ON: HCPCS | Performed by: FAMILY MEDICINE

## 2025-08-13 PROCEDURE — 1160F RVW MEDS BY RX/DR IN RCRD: CPT | Performed by: FAMILY MEDICINE

## 2025-08-13 RX ORDER — NITROFURANTOIN 25; 75 MG/1; MG/1
100 CAPSULE ORAL 2 TIMES DAILY
Qty: 20 CAPSULE | Refills: 0 | Status: SHIPPED | OUTPATIENT
Start: 2025-08-13 | End: 2025-08-23

## 2025-08-25 ENCOUNTER — PATIENT MESSAGE (OUTPATIENT)
Dept: FAMILY MEDICINE CLINIC | Facility: CLINIC | Age: 70
End: 2025-08-25

## 2025-08-27 RX ORDER — PHENAZOPYRIDINE HYDROCHLORIDE 200 MG/1
200 TABLET, FILM COATED ORAL 3 TIMES DAILY PRN
Qty: 30 TABLET | Refills: 0 | Status: SHIPPED | OUTPATIENT
Start: 2025-08-27

## (undated) DEVICE — SUT ETHBND XL 0 18IN NABSRB GRN CR 26MM CT-2 1/2 CIR

## (undated) DEVICE — SYRINGE MED 10ML LL TIP W/O SFTY DISP

## (undated) DEVICE — CONTAINER CELL SAVER 600ML

## (undated) DEVICE — SYRINGE 30ML LL TIP

## (undated) DEVICE — TROCAR: Brand: KII FIOS FIRST ENTRY

## (undated) DEVICE — SLEEVE COMPR MD KNEE LEN SGL USE KENDALL SCD

## (undated) DEVICE — SOL  .9 1000ML BTL

## (undated) DEVICE — GLOVE SUR 6.5 SENSICARE PI PIP GRN PWD F

## (undated) DEVICE — 3M(TM) TEGADERM(TM) TRANSPARENT FILM DRESSING FRAME STYLE 9505W: Brand: 3M™ TEGADERM™

## (undated) DEVICE — KIT VEIN HARVESTING ENDOVEIN

## (undated) DEVICE — GLOVE SUR 6.5 SENSICARE PI PIP CRM PWD F

## (undated) DEVICE — GRABBER GRASPER TIP, DISPOSABLE: Brand: RENEW

## (undated) DEVICE — ANTIBACTERIAL UNDYED BRAIDED (POLYGLACTIN 910), SYNTHETIC ABSORBABLE SUTURE: Brand: COATED VICRYL

## (undated) DEVICE — SUT SILK 2 TIES SA8H

## (undated) DEVICE — 3M™ STERI-STRIP™ REINFORCED ADHESIVE SKIN CLOSURES, R1547, 1/2 IN X 4 IN (12 MM X 100 MM), 6 STRIPS/ENVELOPE: Brand: 3M™ STERI-STRIP™

## (undated) DEVICE — 3M(TM) TEGADERM(TM) TRANSPARENT FILM DRESSING FRAME STYLE 1628: Brand: 3M™ TEGADERM™

## (undated) DEVICE — VIOLET BRAIDED (POLYGLACTIN 910), SYNTHETIC ABSORBABLE SUTURE: Brand: COATED VICRYL

## (undated) DEVICE — MEDI-VAC SUCTION FINE CAPACITY: Brand: CARDINAL HEALTH

## (undated) DEVICE — ANTIBACTERIAL VIOLET BRAIDED (POLYGLACTIN 910), SYNTHETIC ABSORBABLE SUTURE: Brand: COATED VICRYL

## (undated) DEVICE — HEAD AND NECK CDS-LF: Brand: MEDLINE INDUSTRIES, INC.

## (undated) DEVICE — GLOVE SUR 7.5 SENSICARE PI PIP CRM PWD F

## (undated) DEVICE — SUT PERMA- 2-0 30IN SH NABSRB BLK L26MM 1/

## (undated) DEVICE — SPONGE: SPECIALTY PEANUT XR 100/CS: Brand: MEDICAL ACTION INDUSTRIES

## (undated) DEVICE — SOLUTION IRRIG 1000ML 0.9% NACL USP BTL

## (undated) DEVICE — STERILE POLYISOPRENE POWDER-FREE SURGICAL GLOVES: Brand: PROTEXIS

## (undated) DEVICE — TROCAR: Brand: KII SHIELDED BLADED ACCESS SYSTEM

## (undated) DEVICE — SUT COAT VCRL+ 0 27IN UR-6 ABSRB VLT ANTIBACT

## (undated) DEVICE — LAPAROVUE VISIBILITY SYSTEM LAPAROSCOPIC SOLUTIONS: Brand: LAPAROVUE

## (undated) DEVICE — SUTURE WIRE DOUBLE STERNOTOMY

## (undated) DEVICE — BLOWER CLEARVIEW KIT

## (undated) DEVICE — CAUTERY BLADE 2IN INS E1455

## (undated) DEVICE — ADHESIVE SKIN TOP FOR WND CLSR DERMBND ADV

## (undated) DEVICE — SUT PROLENE 8-0 BV130-5 8730H

## (undated) DEVICE — SUTURE MONOCRYL 4-0 PS-2

## (undated) DEVICE — CASED DISP BIPOLAR CORD

## (undated) DEVICE — HUNTER GASPER TIP, DISPOSABLE: Brand: RENEW

## (undated) DEVICE — SUT MCRYL 4-0 18IN PS-2 ABSRB UD 19MM 3/8 CIR

## (undated) DEVICE — DRIVER DRILL ZDRIVE

## (undated) DEVICE — NEPTUNE E-SEP SMOKE EVACUATION PENCIL, COATED, 70MM BLADE, PUSH BUTTON SWITCH: Brand: NEPTUNE E-SEP

## (undated) DEVICE — BINDER ABD UNISX 9IN 62IN L AND XL UNIV

## (undated) DEVICE — GOWN,SIRUS,FAB REINF,RAGLAN,XL,STERILE: Brand: MEDLINE

## (undated) DEVICE — MARYLAND JAW LAPAROSCOPIC SEALER/DIVIDER COATED: Brand: LIGASURE

## (undated) DEVICE — OPEN HEART: Brand: MEDLINE INDUSTRIES, INC.

## (undated) DEVICE — SUT PROLENE 7-0 CC M8705

## (undated) DEVICE — Device: Brand: SUTURE PASSOR PRO

## (undated) DEVICE — COVER,LIGHT,CAMERA,HARD,1/PK,STRL: Brand: MEDLINE

## (undated) DEVICE — ENDOPATH ULTRA VERESS INSUFFLATION NEEDLES WITH LUER LOCK CONNECTORS: Brand: ENDOPATH

## (undated) DEVICE — STRL PENROSE DRAIN 18" X 1/4": Brand: CARDINAL HEALTH

## (undated) DEVICE — LAP CHOLE: Brand: MEDLINE INDUSTRIES, INC.

## (undated) DEVICE — SOL LACT RINGERS 1000ML

## (undated) DEVICE — CHLORAPREP ORANGE TINT 10.5ML

## (undated) DEVICE — SUT ETHBND XL 1 30IN NABSRB GRN 36MM CT-1 1/2 CIR TAPR

## (undated) DEVICE — SUT COAT VCRL 2-0 27IN SH ABSRB UD 26MM 1/2

## (undated) DEVICE — Device

## (undated) DEVICE — SKN PREP SPNG STKS PVP PNT STR: Brand: MEDLINE INDUSTRIES, INC.

## (undated) DEVICE — GLOVE SUR 7.5 SENSICARE PI PIP GRN PWD F

## (undated) DEVICE — CELL SAVER RESERVOIR

## (undated) DEVICE — HARMONIC FOCUS SHEARS 9CM LENGTH + ADAPTIVE TISSUE TECHNOLOGY FOR USE WITH BLUE HAND PIECE ONLY: Brand: HARMONIC FOCUS

## (undated) DEVICE — LIGHT HANDLE

## (undated) DEVICE — GAUZE SPONGES,8 PLY: Brand: CURITY

## (undated) DEVICE — BREAST-HERNIA-PORT CDS-LF: Brand: MEDLINE INDUSTRIES, INC.

## (undated) DEVICE — SUT POLYDEK 2-0 ME-2 69-414

## (undated) DEVICE — SUT PDS II 0 L27IN ABSRB VLT L26MM CT-2

## (undated) DEVICE — SOLUTION  .9 1000ML BTL

## (undated) DEVICE — SCD SLEEVE KNEE HI BLEND

## (undated) DEVICE — TROCAR: Brand: KII® SLEEVE

## (undated) NOTE — LETTER
[address]  [address]  Consent for Procedure/Sedation  Date: 4/23/2025         Time: 1623    I hereby authorize Dr. Ronan Fletcher, my physician and his/her assistants (if applicable), which may include medical students, residents, and/or fellows, to perform the following surgical operation/ procedure and administer such anesthesia as may be determined necessary by my physician:  Operation/Procedure name (s)  Cardiac Catheterization, Left Ventricular Cineangiography, Bilateral Selective Coronary Angiography and/or Right Heart Catheterization; possible Percutaneous Transluminal Coronary Angioplasty, Coronary Atherectomy, Coronary Stent, Intracoronary Thrombolytic therapy, Antiplatelet therapy and/or Intravascular Ultrasound on Jan Babb   2.   I recognize that during the surgical operation/procedure, unforeseen conditions may necessitate additional or different procedures than those listed above.  I, therefore, further authorize and request that the above-named surgeon, assistants, or designees perform such procedures as are, in their judgment, necessary and desirable.    3.   My surgeon/physician has discussed prior to my surgery the potential benefits, risks and side effects of this procedure; the likelihood of achieving goals; and potential problems that might occur during recuperation.  They also discussed reasonable alternatives to the procedure, including risks, benefits, and side effects related to the alternatives and risks related to not receiving this procedure.  I have had all my questions answered and I acknowledge that no guarantee has been made as to the result that may be obtained.    4.   Should the need arise during my operation/procedure, which includes change of level of care prior to discharge, I also consent to the administration of blood and/or blood products.  Further, I understand that despite careful testing and screening of blood or blood products by  collecting agencies, I may still be subject to ill effects as a result of receiving a blood transfusion and/or blood products.  The following are some, but not all, of the potential risks that can occur: fever and allergic reactions, hemolytic reactions, transmission of diseases such as Hepatitis, AIDS and Cytomegalovirus (CMV) and fluid overload.  In the event that I wish to have an autologous transfusion of my own blood, or a directed donor transfusion, I will discuss this with my physician.  Check only if Refusing Blood or Blood Products  I understand refusal of blood or blood products as deemed necessary by my physician may have serious consequences to my condition to include possible death. I hereby assume responsibility for my refusal and release the hospital, its personnel, and my physicians from any responsibility for the consequences of my refusal.          o  Refuse      5.   I authorize the use of any specimen, organs, tissues, body parts or foreign objects that may be removed from my body during the operation/procedure for diagnosis, research or teaching purposes and their subsequent disposal by hospital authorities.  I also authorize the release of specimen test results and/or written reports to my treating physician on the hospital medical staff or other referring or consulting physicians involved in my care, at the discretion of the Pathologist or my treating physician.    6.   I consent to the photographing or videotaping of the operations or procedures to be performed, including appropriate portions of my body for medical, scientific, or educational purposes, provided my identity is not revealed by the pictures or by descriptive texts accompanying them.  If the procedure has been photographed/videotaped, the surgeon will obtain the original picture, image, videotape or CD.  The hospital will not be responsible for storage, release or maintenance of the picture, image, tape or CD.    7.   I consent  to the presence of a  or observers in the operating room as deemed necessary by my physician or their designees.    8.   I recognize that in the event my procedure results in extended X-Ray/fluoroscopy time, I may develop a skin reaction.    9. If I have a Do Not Attempt Resuscitation (DNAR) order in place, that status will be suspended while in the operating room, procedural suite, and during the recovery period unless otherwise explicitly stated by me (or a person authorized to consent on my behalf). The surgeon or my attending physician will determine when the applicable recovery period ends for purposes of reinstating the DNAR order.  10. Patients having a sterilization procedure: I understand that if the procedure is successful the results will be permanent and it will therefore be impossible for me to inseminate, conceive, or bear children.  I also understand that the procedure is intended to result in sterility, although the result has not been guaranteed.   11. I acknowledge that my physician has explained sedation/analgesia administration to me including the risk and benefits I consent to the administration of sedation/analgesia as may be necessary or desirable in the judgment of my physician.    I CERTIFY THAT I HAVE READ AND FULLY UNDERSTAND THE ABOVE CONSENT TO OPERATION and/or OTHER PROCEDURE.      ____________________________________       _________________________________      ______________________________  Signature of Patient         Signature of Responsible Person        Printed Name of Responsible Person   ____________________________________      _________________________________      ______________________________       Signature of Witness          Relationship to Patient                       Date                                       Time  Patient Name: Jan JOANNE Babb  : 1955    Reviewed: 2024   Printed: 2025  Medical Record #: [id number] Page 1 of 1

## (undated) NOTE — LETTER
OUTSIDE TESTING RESULT REQUEST     IMPORTANT: FOR YOUR IMMEDIATE ATTENTION  Please FAX all test results listed below to: [phone]     Testing already done on or about: 25     * * * * If testing is NOT complete, arrange with patient A.S.A.P. * * * *    Patient Name: Jan Babb  Surgery Date: 2/10/2025  Medical Record: [id number]  CSN: [id number]  : 1955 - A: 69 y     Sex: female  Surgeon(s):  Halley Collier MD Alli, Vamsi, MD  Procedure: LAPAROSCOPIC GASTROPEXY, POSSIBLE OPEN  Anesthesia Type: General     Surgeon: Halley Collier MD     The following Testing and Time Line are REQUIRED PER ANESTHESIA     EKG READ AND SIGNED WITHIN   90 days      Thank You,   Sent by: KATHE Kelly

## (undated) NOTE — LETTER
To:  Dr Cruz  Date:  2024  Patient Name: Jan Babb-Age / Sex: 1955-A: 69 y  female  Medical Records: [id number]   CSN: [id number]      Clearance for Surgery Requested by Surgeon    Request for:  Anticoagulation management plan    Requested by Surgeon: Dr Collier    Surgical Date: 2024    Procedure: BILATERAL INGUINAL HERNIA REPAIR WITH MESH PLUG AND UMBILICAL HERNIORRHAPHY, N/A      Please fax the clearance note to the Pre-Admission Testing department.  Thank you.

## (undated) NOTE — LETTER
Halley Collier MD    Surgical Clearance Needed    Date: 2/28/2024                                                                       From: Sara    Attn: Dr. Waldrop                                                                                    RE: Surgical Clearance               # of Pages: 1        Patient Name: Jan Babb    Patient YOB: 1955    Consult For: Medical clearance    Surgery: ROBOTIC LAPAROSCOPIC BILATERAL INGUINAL HERNIA REPAIR WITH MESH    Date of Surgery: 3/14/24 at [facility]         This facsimile transmission is provided for your internal use only. If the reader of this message is not the intended recipient, you are hereby notified that you have received this document in error, and that any review, dissemination, distribution, or copying of this message is strictly prohibited. If you have received this communication in error, please notify us immediately by telephone and return the original message to us by mail.

## (undated) NOTE — LETTER
Halley Collier MD    Surgical Clearance Needed    Date: 2/28/2024                                                                       From: Sara    Attn: Dr. Cruz                                                                                    RE: Surgical Clearance               # of Pages: 1        Patient Name: Jan Babb    Patient YOB: 1955    Consult For: Cardiac clearance and anti coagulation management     Surgery: ROBOTIC LAPAROSCOPIC BILATERAL INGUINAL HERNIA REPAIR WITH MESH    Date of Surgery: 3/14/24 at [facility]         This facsimile transmission is provided for your internal use only. If the reader of this message is not the intended recipient, you are hereby notified that you have received this document in error, and that any review, dissemination, distribution, or copying of this message is strictly prohibited. If you have received this communication in error, please notify us immediately by telephone and return the original message to us by mail.

## (undated) NOTE — LETTER
To:  Dr Cruz  Date:  2024  Patient Name: Jan Babb-Age / Sex: 1955-A: 69 y  female  Medical Records: [id number]   CSN: [id number]      Clearance for Surgery Requested by Surgeon    Request for:  Anticoagulation management plan    Requested by Surgeon: Dr Collier    Surgical Date: 2024    Procedure: BILATERAL INGUINAL HERNIA REPAIR WITH MESH PLUG AND UMBILICAL HERNIORRHAPHY, N/A      Please fax the clearance note to the Pre-Admission Testing department.  Thank you.

## (undated) NOTE — LETTER
Halley Collier M.D.    Surgical Clearance Needed    Date: 1/17/2025                                                                       From: KAYLENE    Attn: DR DEGROOT                                                                                 Fax:     RE: Surgical Clearance               # of Pages: 1        Patient Name: Jan Babb    Patient YOB: 1955    Consult For: ANTICOAGULATION MANAGEMENT    Surgery: LAPROSCOPIC GASTROPLEXY POSSIBLE OPEN    Date of Surgery: 2/10/25        This facsimile transmission is provided for your internal use only. If the reader of this message is not the intended recipient, you are hereby notified that you have received this document in error, and that any review, dissemination, distribution, or copying of this message is strictly prohibited. If you have received this communication in error, please notify us immediately by telephone and return the original message to us by mail.

## (undated) NOTE — LETTER
To: Dr.David Collier  Date:  2025  Patient Name: Jan Babb-Age / Sex: 1955-A: 69 y  female  Medical Records: [id number]   CSN: [id number]      Clearance for Surgery Requested by Surgeon    Request for:  Pulmonary Clearance    Requested by Surgeon: Dr.Lori Crandall    Surgical Date: 2/10/2025    Procedure: LAPAROSCOPIC GASTROPEXY, POSSIBLE OPEN, N/A      Please fax the clearance note to the Pre-Admission Testing department.  Thank you.    KATHE Todd  PreAdmission [facility]  [phone]-fax  [phone] phone

## (undated) NOTE — LETTER
To:  Dr. Piedra  Date:  2025  Patient Name: Jan Babb-Age / Sex: 1955-A: 69 y  female  Medical Records: [id number]   CSN: [id number]      Clearance for Surgery Requested by Surgeon    Request for:  Anticoagulation management    Requested by Surgeon: Dr. Collier    Surgical Date: 2/10/2025    Procedure: LAPROSCOPIC GASTROPLEXY POSSIBLE OPEN, N/A      Please fax the clearance note to the Pre-Admission Testing department.  Thank you.

## (undated) NOTE — LETTER
Halley Collier MD    Surgical Clearance Needed    Date: 2/28/2024                                                                       From: Sara    Attn: Dr. Coleman                                                                                    RE: Surgical Clearance               # of Pages: 1        Patient Name: Jan Babb    Patient YOB: 1955    Consult For: Pulmonary clearance    Surgery: ROBOTIC LAPAROSCOPIC BILATERAL INGUINAL HERNIA REPAIR WITH MESH    Date of Surgery: 3/14/24 at [facility]         This facsimile transmission is provided for your internal use only. If the reader of this message is not the intended recipient, you are hereby notified that you have received this document in error, and that any review, dissemination, distribution, or copying of this message is strictly prohibited. If you have received this communication in error, please notify us immediately by telephone and return the original message to us by mail.